# Patient Record
Sex: MALE | Race: WHITE | NOT HISPANIC OR LATINO | Employment: FULL TIME | URBAN - METROPOLITAN AREA
[De-identification: names, ages, dates, MRNs, and addresses within clinical notes are randomized per-mention and may not be internally consistent; named-entity substitution may affect disease eponyms.]

---

## 2023-05-09 DIAGNOSIS — I10 ESSENTIAL (PRIMARY) HYPERTENSION: ICD-10-CM

## 2023-05-09 DIAGNOSIS — E78.00 PURE HYPERCHOLESTEROLEMIA, UNSPECIFIED: ICD-10-CM

## 2023-05-09 DIAGNOSIS — F41.9 ANXIETY DISORDER, UNSPECIFIED: ICD-10-CM

## 2023-05-09 DIAGNOSIS — E78.1 PURE HYPERGLYCERIDEMIA: ICD-10-CM

## 2023-05-09 RX ORDER — GEMFIBROZIL 600 MG/1
TABLET, FILM COATED ORAL
Qty: 90 TABLET | Refills: 0 | Status: SHIPPED | OUTPATIENT
Start: 2023-05-09 | End: 2023-06-08 | Stop reason: SDUPTHER

## 2023-05-09 RX ORDER — ATORVASTATIN CALCIUM 40 MG/1
TABLET, FILM COATED ORAL
Qty: 90 TABLET | Refills: 0 | Status: SHIPPED | OUTPATIENT
Start: 2023-05-09 | End: 2023-06-08 | Stop reason: SDUPTHER

## 2023-05-09 RX ORDER — LISINOPRIL 10 MG/1
TABLET ORAL
Qty: 90 TABLET | Refills: 0 | Status: SHIPPED | OUTPATIENT
Start: 2023-05-09 | End: 2023-06-08 | Stop reason: SDUPTHER

## 2023-05-09 RX ORDER — SERTRALINE HYDROCHLORIDE 100 MG/1
TABLET, FILM COATED ORAL
Qty: 135 TABLET | Refills: 0 | Status: SHIPPED | OUTPATIENT
Start: 2023-05-09 | End: 2023-06-08 | Stop reason: SDUPTHER

## 2023-06-07 PROBLEM — E78.00 HYPERCHOLESTEROLEMIA: Status: ACTIVE | Noted: 2023-06-07

## 2023-06-07 PROBLEM — R73.09 ELEVATED GLUCOSE: Status: ACTIVE | Noted: 2023-06-07

## 2023-06-07 PROBLEM — F41.9 ANXIETY: Status: ACTIVE | Noted: 2023-06-07

## 2023-06-07 PROBLEM — D64.9 ANEMIA: Status: ACTIVE | Noted: 2023-06-07

## 2023-06-07 PROBLEM — I10 HYPERTENSION: Status: ACTIVE | Noted: 2023-06-07

## 2023-06-07 PROBLEM — R91.1 LUNG NODULE: Status: ACTIVE | Noted: 2023-06-07

## 2023-06-08 ENCOUNTER — OFFICE VISIT (OUTPATIENT)
Dept: PRIMARY CARE | Facility: CLINIC | Age: 48
End: 2023-06-08
Payer: COMMERCIAL

## 2023-06-08 VITALS
HEART RATE: 66 BPM | SYSTOLIC BLOOD PRESSURE: 133 MMHG | WEIGHT: 228.8 LBS | DIASTOLIC BLOOD PRESSURE: 78 MMHG | HEIGHT: 68 IN | BODY MASS INDEX: 34.68 KG/M2 | TEMPERATURE: 98.3 F

## 2023-06-08 DIAGNOSIS — R91.1 LUNG NODULE SEEN ON IMAGING STUDY: ICD-10-CM

## 2023-06-08 DIAGNOSIS — I10 ESSENTIAL (PRIMARY) HYPERTENSION: ICD-10-CM

## 2023-06-08 DIAGNOSIS — E78.00 PURE HYPERCHOLESTEROLEMIA, UNSPECIFIED: ICD-10-CM

## 2023-06-08 DIAGNOSIS — Z12.5 PROSTATE CANCER SCREENING: ICD-10-CM

## 2023-06-08 DIAGNOSIS — E78.1 PURE HYPERGLYCERIDEMIA: ICD-10-CM

## 2023-06-08 DIAGNOSIS — F41.9 ANXIETY DISORDER, UNSPECIFIED: ICD-10-CM

## 2023-06-08 DIAGNOSIS — Z00.00 HEALTHCARE MAINTENANCE: Primary | ICD-10-CM

## 2023-06-08 DIAGNOSIS — G47.33 OSA (OBSTRUCTIVE SLEEP APNEA): ICD-10-CM

## 2023-06-08 PROCEDURE — 3075F SYST BP GE 130 - 139MM HG: CPT | Performed by: FAMILY MEDICINE

## 2023-06-08 PROCEDURE — 3078F DIAST BP <80 MM HG: CPT | Performed by: FAMILY MEDICINE

## 2023-06-08 PROCEDURE — 1036F TOBACCO NON-USER: CPT | Performed by: FAMILY MEDICINE

## 2023-06-08 PROCEDURE — 99396 PREV VISIT EST AGE 40-64: CPT | Performed by: FAMILY MEDICINE

## 2023-06-08 RX ORDER — ATORVASTATIN CALCIUM 40 MG/1
TABLET, FILM COATED ORAL
Qty: 90 TABLET | Refills: 1 | Status: SHIPPED | OUTPATIENT
Start: 2023-06-08 | End: 2024-01-30

## 2023-06-08 RX ORDER — LISINOPRIL 10 MG/1
TABLET ORAL
Qty: 90 TABLET | Refills: 1 | Status: SHIPPED | OUTPATIENT
Start: 2023-06-08 | End: 2024-01-30

## 2023-06-08 RX ORDER — GEMFIBROZIL 600 MG/1
TABLET, FILM COATED ORAL
Qty: 90 TABLET | Refills: 1 | Status: SHIPPED | OUTPATIENT
Start: 2023-06-08 | End: 2024-01-30

## 2023-06-08 RX ORDER — SERTRALINE HYDROCHLORIDE 100 MG/1
150 TABLET, FILM COATED ORAL DAILY
Qty: 135 TABLET | Refills: 1 | Status: SHIPPED | OUTPATIENT
Start: 2023-06-08 | End: 2024-01-30

## 2023-06-08 ASSESSMENT — PATIENT HEALTH QUESTIONNAIRE - PHQ9
SUM OF ALL RESPONSES TO PHQ QUESTIONS 1-9: 0
SUM OF ALL RESPONSES TO PHQ9 QUESTIONS 1 AND 2: 0
7. TROUBLE CONCENTRATING ON THINGS, SUCH AS READING THE NEWSPAPER OR WATCHING TELEVISION: NOT AT ALL
4. FEELING TIRED OR HAVING LITTLE ENERGY: NOT AT ALL
1. LITTLE INTEREST OR PLEASURE IN DOING THINGS: NOT AT ALL
9. THOUGHTS THAT YOU WOULD BE BETTER OFF DEAD, OR OF HURTING YOURSELF: NOT AT ALL
8. MOVING OR SPEAKING SO SLOWLY THAT OTHER PEOPLE COULD HAVE NOTICED. OR THE OPPOSITE, BEING SO FIGETY OR RESTLESS THAT YOU HAVE BEEN MOVING AROUND A LOT MORE THAN USUAL: NOT AT ALL
6. FEELING BAD ABOUT YOURSELF - OR THAT YOU ARE A FAILURE OR HAVE LET YOURSELF OR YOUR FAMILY DOWN: NOT AT ALL
3. TROUBLE FALLING OR STAYING ASLEEP OR SLEEPING TOO MUCH: NOT AT ALL
5. POOR APPETITE OR OVEREATING: NOT AT ALL
2. FEELING DOWN, DEPRESSED OR HOPELESS: NOT AT ALL

## 2023-06-08 NOTE — PROGRESS NOTES
"Subjective   Patient ID: Edward Wolfe is a 48 y.o. male who presents for Annual Exam (He is not fasting for blood work today. ).    HPI     48 year-old male presents for a physical.     hx of HTN- on lisinopril 10mg; does not check BP at home.   hx of HLD- on gemfibrozil and atorvastatin  hx of anxiety- on zoloft 150mg/day and doing ok  hx elevated glucose    BMI 34  +exercise and trying to eat a healthy diet    tetanus- 2021  flu shot- defers  Shingrix-not yet      wears glasses; sees optho  sees dentist     he denies any chest pains, palpitations, edema, shortness of breath, abdominal pains, reflux, nausea, vomiting, diarrhea, constipation, blood in stool, melena. no groin or testicle pain or swelling.   does     Denies any mole changes.  Saw derm in past  No hx skin CA    has had a colonoscopy about 12 yrs ago with GI pearlweiler    Had CT calcium score 77181; score of 20 but showed some other abnormailitys/nodules; saw pulm for fu and had chest CT 8/2021- pulm recommended repeat CT in 12/2021 but never had done.     Hx BESSIE- used to use CPAP but not recently  +snoring/apnea/fatigue        Review of Systems  ROS as stated in HPI    Objective   /78   Pulse 66   Temp 36.8 °C (98.3 °F)   Ht 1.727 m (5' 8\")   Wt 104 kg (228 lb 12.8 oz)   BMI 34.79 kg/m²     Physical Exam  Constitutional: A&O; NAD  HEENT: conjunctiva normal. EOMI; PERRLA; lids normal; TM's clear;   Neck: supple; No lymphadenopathy   Heart: RRR     Lungs: CTA bilateral   Abd: Soft, Nontender, Nondistended  Ext:  No edema;    Neuro: No gross neuro deficits    Psych: Judgment, orientation, mood, affect, insight wnl   Assessment/Plan   Problem List Items Addressed This Visit    None  Visit Diagnoses       Healthcare maintenance    -  Primary    Relevant Orders    CBC    Comprehensive Metabolic Panel    Hemoglobin A1C    Lipid Panel    TSH with reflex to Free T4 if abnormal    Vitamin D, Total    Pure hypercholesterolemia, unspecified        " Relevant Medications    atorvastatin (Lipitor) 40 mg tablet    Pure hyperglyceridemia        Relevant Medications    gemfibrozil (Lopid) 600 mg tablet    Essential (primary) hypertension        Relevant Medications    lisinopril 10 mg tablet    Anxiety disorder, unspecified        Relevant Medications    sertraline (Zoloft) 100 mg tablet    Prostate cancer screening        Relevant Orders    Prostate Specific Antigen, Screen    Lung nodule seen on imaging study        Relevant Orders    CT chest wo IV contrast    BESSIE (obstructive sleep apnea)        Relevant Orders    Referral to Adult Sleep Medicine          Tdap 2021  defers flu shot  Shingrix recommended-info given   healthy lifestyle-diet, exercise, wt loss  check labs   rx refills sent   recheck chest CT   Fu with GI for screening colonoscopy  Fu with sleep med re: BESSIE

## 2023-06-26 LAB
NON-UH HIE A/G RATIO: 1.6
NON-UH HIE ALB: 4.6 G/DL (ref 3.4–5)
NON-UH HIE ALK PHOS: 75 UNIT/L (ref 46–116)
NON-UH HIE BILIRUBIN, TOTAL: 0.5 MG/DL (ref 0.2–1)
NON-UH HIE BUN/CREAT RATIO: 19.1
NON-UH HIE BUN: 21 MG/DL (ref 9–23)
NON-UH HIE CALCIUM: 9.7 MG/DL (ref 8.7–10.4)
NON-UH HIE CALCULATED LDL CHOLESTEROL: 144 MG/DL (ref 60–130)
NON-UH HIE CALCULATED OSMOLALITY: 279 MOSM/KG (ref 275–295)
NON-UH HIE CHLORIDE: 105 MMOL/L (ref 98–107)
NON-UH HIE CHOLESTEROL: 215 MG/DL (ref 100–200)
NON-UH HIE CO2, VENOUS: 23 MMOL/L (ref 20–31)
NON-UH HIE CREATININE: 1.1 MG/DL (ref 0.6–1.1)
NON-UH HIE GFR AA: >60
NON-UH HIE GLOBULIN: 2.9 G/DL
NON-UH HIE GLOMERULAR FILTRATION RATE: >60 ML/MIN/1.73M?
NON-UH HIE GLUCOSE: 107 MG/DL (ref 74–106)
NON-UH HIE GOT: 34 UNIT/L (ref 15–37)
NON-UH HIE GPT: 58 UNIT/L (ref 10–49)
NON-UH HIE HCT: 43.7 % (ref 41–52)
NON-UH HIE HDL CHOLESTEROL: 45 MG/DL (ref 40–60)
NON-UH HIE HGB A1C: 5.4 %
NON-UH HIE HGB: 14.7 G/DL (ref 13.5–17.5)
NON-UH HIE INSTR WBC ND: 7
NON-UH HIE K: 4.3 MMOL/L (ref 3.5–5.1)
NON-UH HIE MCH: 29.7 PG (ref 27–34)
NON-UH HIE MCHC: 33.7 G/DL (ref 32–37)
NON-UH HIE MCV: 88.2 FL (ref 80–100)
NON-UH HIE MPV: 7.7 FL (ref 7.4–10.4)
NON-UH HIE NA: 138 MMOL/L (ref 135–145)
NON-UH HIE PLATELET: 407 X10 (ref 150–450)
NON-UH HIE PROSTATIC SPECIFIC AG SCREEN: 2.6 NG/ML (ref 0–4)
NON-UH HIE RBC: 4.96 X10 (ref 4.7–6.1)
NON-UH HIE RDW: 13.4 % (ref 11.5–14.5)
NON-UH HIE TOTAL CHOL/HDL CHOL RATIO: 4.8
NON-UH HIE TOTAL PROTEIN: 7.5 G/DL (ref 5.7–8.2)
NON-UH HIE TRIGLYCERIDES: 131 MG/DL (ref 30–150)
NON-UH HIE TSH: 0.84 UIU/ML (ref 0.55–4.78)
NON-UH HIE VIT D 25: 18 NG/ML
NON-UH HIE WBC: 7 X10 (ref 4.5–11)

## 2023-06-28 ENCOUNTER — TELEPHONE (OUTPATIENT)
Dept: PRIMARY CARE | Facility: CLINIC | Age: 48
End: 2023-06-28
Payer: COMMERCIAL

## 2023-06-28 DIAGNOSIS — E55.9 VITAMIN D DEFICIENCY: ICD-10-CM

## 2023-06-28 RX ORDER — ERGOCALCIFEROL 1.25 MG/1
50000 CAPSULE ORAL
Qty: 12 CAPSULE | Refills: 0 | Status: SHIPPED | OUTPATIENT
Start: 2023-06-28 | End: 2023-09-20

## 2023-06-28 NOTE — TELEPHONE ENCOUNTER
----- Message from Nellie Harrison DO sent at 6/27/2023  7:40 PM EDT -----  Please let pt know that his prostate test,  blood counts, electrolytes, thyroid, and kidney function are all normal.   His cholesterol was elevated.    His sugar was slightly elevated at 107 but his HbA1c, the 3 month average of sugars was ok at 5.4. (anything 6.5 and above is considered diabetic).  I recommend a healthy diet and exercise and we will continue to monitor.   One of his liver enzymes was slightly elevated.   His vitamin D level is low at 18(should be above 30).  I recommend he take a higher dose prescription strength vitamin D once a week for 12 weeks and then follow up after to see how he is doing and recheck labs at that time.  Please enter rx for vitamin D 50, 000 units once a week #12 no refills dx: vitamin D deficiency

## 2023-07-17 ENCOUNTER — TELEPHONE (OUTPATIENT)
Dept: PRIMARY CARE | Facility: CLINIC | Age: 48
End: 2023-07-17
Payer: COMMERCIAL

## 2023-07-17 DIAGNOSIS — J98.59 MEDIASTINAL MASS: ICD-10-CM

## 2023-07-17 NOTE — TELEPHONE ENCOUNTER
Informed patient and entered referral for scheduling.  He also requested to have a Collect message sent with Dr. Whitaker's information.   Sent that as well.

## 2023-07-17 NOTE — TELEPHONE ENCOUNTER
----- Message from Nellie Harrison, DO sent at 7/17/2023  3:14 PM EDT -----  Please call pt and let him know his chest CT did not show any suspicious lung nodules but there is an irregular area in his chest that has gotten slightly larger since his last CT scan so  I would like him to follow up with a thoracic surgeon for further evaluation.   Please enter referral for thoracic surgeon and give him #s  dx mediastinal mass

## 2023-07-31 ENCOUNTER — TELEPHONE (OUTPATIENT)
Dept: PRIMARY CARE | Facility: CLINIC | Age: 48
End: 2023-07-31
Payer: COMMERCIAL

## 2023-07-31 NOTE — TELEPHONE ENCOUNTER
----- Message from Nellie Harrison DO sent at 7/17/2023  6:10 PM EDT -----  Noted; thanks. Please follow up and make sure he was able to schedule and for when.    ----- Message -----  From: Geetha Sales CMA  Sent: 7/17/2023   4:16 PM EDT  To: Nellie Harrison DO

## 2023-09-29 ENCOUNTER — PATIENT MESSAGE (OUTPATIENT)
Dept: PRIMARY CARE | Facility: CLINIC | Age: 48
End: 2023-09-29
Payer: COMMERCIAL

## 2023-09-29 DIAGNOSIS — E55.9 VITAMIN D DEFICIENCY: ICD-10-CM

## 2023-09-29 DIAGNOSIS — R73.09 ELEVATED GLUCOSE: ICD-10-CM

## 2023-09-29 DIAGNOSIS — I10 HYPERTENSION, UNSPECIFIED TYPE: Primary | ICD-10-CM

## 2023-09-29 DIAGNOSIS — E78.00 HYPERCHOLESTEROLEMIA: ICD-10-CM

## 2023-10-04 ENCOUNTER — LAB (OUTPATIENT)
Dept: LAB | Facility: LAB | Age: 48
End: 2023-10-04
Payer: COMMERCIAL

## 2023-10-04 DIAGNOSIS — D49.89 NEOPLASM OF UNSPECIFIED BEHAVIOR OF OTHER SPECIFIED SITES: Primary | ICD-10-CM

## 2023-10-04 LAB
ANION GAP SERPL CALC-SCNC: 14 MMOL/L (ref 10–20)
APTT PPP: 32 SECONDS (ref 27–38)
BUN SERPL-MCNC: 14 MG/DL (ref 6–23)
CALCIUM SERPL-MCNC: 9.9 MG/DL (ref 8.6–10.3)
CHLORIDE SERPL-SCNC: 104 MMOL/L (ref 98–107)
CO2 SERPL-SCNC: 24 MMOL/L (ref 21–32)
CREAT SERPL-MCNC: 0.99 MG/DL (ref 0.5–1.3)
ERYTHROCYTE [DISTWIDTH] IN BLOOD BY AUTOMATED COUNT: 11.9 % (ref 11.5–14.5)
GFR SERPL CREATININE-BSD FRML MDRD: >90 ML/MIN/1.73M*2
GLUCOSE SERPL-MCNC: 107 MG/DL (ref 74–99)
HCT VFR BLD AUTO: 40.5 % (ref 41–52)
HGB BLD-MCNC: 14 G/DL (ref 13.5–17.5)
INR PPP: 1 (ref 0.9–1.1)
LDH SERPL L TO P-CCNC: 175 U/L (ref 84–246)
MCH RBC QN AUTO: 30.2 PG (ref 26–34)
MCHC RBC AUTO-ENTMCNC: 34.6 G/DL (ref 32–36)
MCV RBC AUTO: 87 FL (ref 80–100)
NRBC BLD-RTO: 0 /100 WBCS (ref 0–0)
PLATELET # BLD AUTO: 416 X10*3/UL (ref 150–450)
PMV BLD AUTO: 9.3 FL (ref 7.5–11.5)
POTASSIUM SERPL-SCNC: 4.1 MMOL/L (ref 3.5–5.3)
PROTHROMBIN TIME: 11.2 SECONDS (ref 9.8–12.8)
RBC # BLD AUTO: 4.64 X10*6/UL (ref 4.5–5.9)
SODIUM SERPL-SCNC: 138 MMOL/L (ref 136–145)
WBC # BLD AUTO: 6.3 X10*3/UL (ref 4.4–11.3)

## 2023-10-04 PROCEDURE — 82105 ALPHA-FETOPROTEIN SERUM: CPT

## 2023-10-04 PROCEDURE — 84702 CHORIONIC GONADOTROPIN TEST: CPT

## 2023-10-04 PROCEDURE — 36415 COLL VENOUS BLD VENIPUNCTURE: CPT

## 2023-10-05 LAB
AFP SERPL-MCNC: <4 NG/ML (ref 0–9)
B-HCG SERPL-ACNC: <3 MIU/ML

## 2023-10-07 RX ORDER — ACETAMINOPHEN 500 MG
1000 TABLET ORAL 3 TIMES DAILY
COMMUNITY
Start: 2021-02-04 | End: 2023-10-07 | Stop reason: ALTCHOICE

## 2023-10-07 RX ORDER — IBUPROFEN 200 MG
200 TABLET ORAL EVERY 8 HOURS PRN
COMMUNITY

## 2023-10-09 ENCOUNTER — HOSPITAL ENCOUNTER (OUTPATIENT)
Dept: RADIOLOGY | Facility: HOSPITAL | Age: 48
Discharge: HOME | End: 2023-10-09
Payer: COMMERCIAL

## 2023-10-09 ENCOUNTER — LAB (OUTPATIENT)
Dept: LAB | Facility: LAB | Age: 48
End: 2023-10-09
Payer: COMMERCIAL

## 2023-10-09 VITALS
DIASTOLIC BLOOD PRESSURE: 79 MMHG | WEIGHT: 229.28 LBS | RESPIRATION RATE: 14 BRPM | HEART RATE: 63 BPM | TEMPERATURE: 97.7 F | HEIGHT: 68 IN | SYSTOLIC BLOOD PRESSURE: 122 MMHG | OXYGEN SATURATION: 96 % | BODY MASS INDEX: 34.75 KG/M2

## 2023-10-09 DIAGNOSIS — D49.89 NEOPLASM OF UNSPECIFIED BEHAVIOR OF OTHER SPECIFIED SITES: ICD-10-CM

## 2023-10-09 DIAGNOSIS — R73.09 ELEVATED GLUCOSE: ICD-10-CM

## 2023-10-09 DIAGNOSIS — E78.00 HYPERCHOLESTEROLEMIA: ICD-10-CM

## 2023-10-09 DIAGNOSIS — E55.9 VITAMIN D DEFICIENCY: ICD-10-CM

## 2023-10-09 DIAGNOSIS — R59.9 ENLARGED LYMPH NODES, UNSPECIFIED: ICD-10-CM

## 2023-10-09 DIAGNOSIS — I10 HYPERTENSION, UNSPECIFIED TYPE: ICD-10-CM

## 2023-10-09 LAB
25(OH)D3 SERPL-MCNC: 23 NG/ML (ref 30–100)
ALBUMIN SERPL BCP-MCNC: 4.9 G/DL (ref 3.4–5)
ALP SERPL-CCNC: 59 U/L (ref 33–120)
ALT SERPL W P-5'-P-CCNC: 26 U/L (ref 10–52)
ANION GAP SERPL CALC-SCNC: 16 MMOL/L (ref 10–20)
AST SERPL W P-5'-P-CCNC: 20 U/L (ref 9–39)
BILIRUB SERPL-MCNC: 0.5 MG/DL (ref 0–1.2)
BUN SERPL-MCNC: 18 MG/DL (ref 6–23)
CALCIUM SERPL-MCNC: 9.7 MG/DL (ref 8.6–10.6)
CHLORIDE SERPL-SCNC: 108 MMOL/L (ref 98–107)
CHOLEST SERPL-MCNC: 177 MG/DL (ref 0–199)
CHOLESTEROL/HDL RATIO: 4.5
CO2 SERPL-SCNC: 23 MMOL/L (ref 21–32)
CREAT SERPL-MCNC: 0.92 MG/DL (ref 0.5–1.3)
EST. AVERAGE GLUCOSE BLD GHB EST-MCNC: 111 MG/DL
GFR SERPL CREATININE-BSD FRML MDRD: >90 ML/MIN/1.73M*2
GLUCOSE SERPL-MCNC: 100 MG/DL (ref 74–99)
HBA1C MFR BLD: 5.5 %
HDLC SERPL-MCNC: 39.3 MG/DL
LDLC SERPL CALC-MCNC: 112 MG/DL (ref 140–190)
NON HDL CHOLESTEROL: 138 MG/DL (ref 0–149)
POTASSIUM SERPL-SCNC: 4.5 MMOL/L (ref 3.5–5.3)
PROT SERPL-MCNC: 7.5 G/DL (ref 6.4–8.2)
SODIUM SERPL-SCNC: 142 MMOL/L (ref 136–145)
TRIGL SERPL-MCNC: 127 MG/DL (ref 0–149)
VLDL: 25 MG/DL (ref 0–40)

## 2023-10-09 PROCEDURE — 88342 IMHCHEM/IMCYTCHM 1ST ANTB: CPT | Performed by: STUDENT IN AN ORGANIZED HEALTH CARE EDUCATION/TRAINING PROGRAM

## 2023-10-09 PROCEDURE — 88305 TISSUE EXAM BY PATHOLOGIST: CPT | Performed by: STUDENT IN AN ORGANIZED HEALTH CARE EDUCATION/TRAINING PROGRAM

## 2023-10-09 PROCEDURE — 38505 NEEDLE BIOPSY LYMPH NODES: CPT

## 2023-10-09 PROCEDURE — 88173 CYTOPATH EVAL FNA REPORT: CPT | Mod: TC | Performed by: THORACIC SURGERY (CARDIOTHORACIC VASCULAR SURGERY)

## 2023-10-09 PROCEDURE — 88184 FLOWCYTOMETRY/ TC 1 MARKER: CPT | Mod: TC | Performed by: THORACIC SURGERY (CARDIOTHORACIC VASCULAR SURGERY)

## 2023-10-09 PROCEDURE — 83036 HEMOGLOBIN GLYCOSYLATED A1C: CPT

## 2023-10-09 PROCEDURE — 88342 IMHCHEM/IMCYTCHM 1ST ANTB: CPT | Mod: TC,59,PARLAB | Performed by: THORACIC SURGERY (CARDIOTHORACIC VASCULAR SURGERY)

## 2023-10-09 PROCEDURE — 88305 TISSUE EXAM BY PATHOLOGIST: CPT | Mod: TC,CMCLAB,PARLAB | Performed by: THORACIC SURGERY (CARDIOTHORACIC VASCULAR SURGERY)

## 2023-10-09 PROCEDURE — 36415 COLL VENOUS BLD VENIPUNCTURE: CPT | Performed by: FAMILY MEDICINE

## 2023-10-09 PROCEDURE — 82306 VITAMIN D 25 HYDROXY: CPT

## 2023-10-09 PROCEDURE — 88173 CYTOPATH EVAL FNA REPORT: CPT | Performed by: PATHOLOGY

## 2023-10-09 PROCEDURE — 36415 COLL VENOUS BLD VENIPUNCTURE: CPT

## 2023-10-09 PROCEDURE — 10005 FNA BX W/US GDN 1ST LES: CPT | Performed by: RADIOLOGY

## 2023-10-09 PROCEDURE — 80053 COMPREHEN METABOLIC PANEL: CPT

## 2023-10-09 PROCEDURE — 80061 LIPID PANEL: CPT

## 2023-10-09 PROCEDURE — 10005 FNA BX W/US GDN 1ST LES: CPT

## 2023-10-09 PROCEDURE — 88305 TISSUE EXAM BY PATHOLOGIST: CPT | Mod: TC,SUR,CMCLAB,PARLAB | Performed by: THORACIC SURGERY (CARDIOTHORACIC VASCULAR SURGERY)

## 2023-10-09 PROCEDURE — 2720000007 HC OR 272 NO HCPCS

## 2023-10-09 PROCEDURE — 88189 FLOWCYTOMETRY/READ 16 & >: CPT | Performed by: PATHOLOGY

## 2023-10-09 ASSESSMENT — PAIN SCALES - GENERAL
PAINLEVEL_OUTOF10: 0 - NO PAIN

## 2023-10-09 ASSESSMENT — PAIN - FUNCTIONAL ASSESSMENT
PAIN_FUNCTIONAL_ASSESSMENT: 0-10
PAIN_FUNCTIONAL_ASSESSMENT: 0-10

## 2023-10-09 NOTE — PRE-PROCEDURE NOTE
Interventional Radiology Preprocedure Note    Indication for procedure: Diagnoses of Enlarged lymph nodes, unspecified and Neoplasm of unspecified behavior of other specified sites were pertinent to this visit.    Relevant review of systems: NA    Relevant Labs:   Lab Results   Component Value Date    CREATININE 0.99 10/04/2023    EGFR >90 10/04/2023    INR 1.0 10/04/2023    PROTIME 11.2 10/04/2023       Planned Sedation/Anesthesia: Minimal    Airway assessment: normal    Directed physical examination:    Aox3  No increased work of breathing.   No acute distress      Mallampati: II (hard and soft palate, upper portion of tonsils anduvula visible)    ASA Score: ASA 1 - Normal health patient    Benefits, risks and alternatives of procedure and planned sedation have been discussed with the patient and/or their representative. All questions answered and they agree to proceed.

## 2023-10-09 NOTE — POST-PROCEDURE NOTE
Interventional Radiology Brief Postprocedure Note    Attending: Jhonny Haas MD       Assistant: none    Diagnosis: supraclavicular lymphadenopathy    Description of procedure: right supraclavicular lymph node biopsy      Anesthesia:  Local    Complications: None    Estimated Blood Loss: none    Medications (Filter: Administrations occurring from 0843 to 0916 on 10/09/23)      None        25  and 22 G FNA sent in rpmi and cytology and 20 G core biopsy samples sent in formalin provided      See detailed result report with images in PACS.    The patient tolerated the procedure well without incident or complication and is in stable condition.

## 2023-10-09 NOTE — DISCHARGE INSTRUCTIONS
Patient educated regarding care after lymph node biopsy. Patient in stable condition and ready for discharge.

## 2023-10-12 LAB
CELL COUNT (BLOOD): 0.08 X10*3/UL
CELL POPULATIONS: NORMAL
DIAGNOSIS: NORMAL
FLOW DIFFERENTIAL: NORMAL
FLOW TEST ORDERED: NORMAL
LAB TEST METHOD: NORMAL
NUMBER OF CELLS COLLECTED: NORMAL
PATH REPORT.TOTAL CANCER: NORMAL
SIGNATURE COMMENT: NORMAL
SPECIMEN VIABILITY: NORMAL

## 2023-10-13 ENCOUNTER — PREP FOR PROCEDURE (OUTPATIENT)
Dept: SURGERY | Facility: CLINIC | Age: 48
End: 2023-10-13
Payer: COMMERCIAL

## 2023-10-13 DIAGNOSIS — R59.9 LYMPH NODE ENLARGEMENT: Primary | ICD-10-CM

## 2023-10-13 LAB
LAB AP ASR DISCLAIMER: NORMAL
LABORATORY COMMENT REPORT: NORMAL
PATH REPORT.COMMENTS IMP SPEC: NORMAL
PATH REPORT.FINAL DX SPEC: NORMAL
PATH REPORT.GROSS SPEC: NORMAL
PATH REPORT.RELEVANT HX SPEC: NORMAL
PATH REPORT.TOTAL CANCER: NORMAL

## 2023-10-16 ENCOUNTER — TELEPHONE (OUTPATIENT)
Dept: PRIMARY CARE | Facility: CLINIC | Age: 48
End: 2023-10-16
Payer: COMMERCIAL

## 2023-10-16 PROBLEM — R59.9 LYMPH NODE ENLARGEMENT: Status: ACTIVE | Noted: 2023-10-13

## 2023-10-16 LAB
LABORATORY COMMENT REPORT: NORMAL
LABORATORY COMMENT REPORT: NORMAL
PATH REPORT.COMMENTS IMP SPEC: NORMAL
PATH REPORT.FINAL DX SPEC: NORMAL
PATH REPORT.GROSS SPEC: NORMAL
PATH REPORT.RELEVANT HX SPEC: NORMAL
PATH REPORT.TOTAL CANCER: NORMAL

## 2023-10-16 NOTE — TELEPHONE ENCOUNTER
----- Message from Nellie Harrison DO sent at 10/10/2023  4:49 PM EDT -----  Please let pt know that his electrolytes, liver, and kidney function are all normal. His LDL cholesterol was just slightly elevated.  His sugar was slightly elevated at 100 but his  HbA1c, the 3 month average of sugars was ok at 5.5  (anything 6.5 and above is considered diabetic).  I recommend a healthy diet and exercise and we will continue to monitor.   His vitamin D level is low at 23(should be above 30).  I recommend he  take at least 2000 units of an OTC vitamin D supplement daily and we will cont to monitor.

## 2023-10-19 ENCOUNTER — ANESTHESIA EVENT (OUTPATIENT)
Dept: OPERATING ROOM | Facility: HOSPITAL | Age: 48
End: 2023-10-19
Payer: COMMERCIAL

## 2023-10-20 ENCOUNTER — HOSPITAL ENCOUNTER (OUTPATIENT)
Facility: HOSPITAL | Age: 48
Setting detail: OUTPATIENT SURGERY
Discharge: HOME | End: 2023-10-20
Attending: THORACIC SURGERY (CARDIOTHORACIC VASCULAR SURGERY) | Admitting: THORACIC SURGERY (CARDIOTHORACIC VASCULAR SURGERY)
Payer: COMMERCIAL

## 2023-10-20 ENCOUNTER — ANESTHESIA (OUTPATIENT)
Dept: OPERATING ROOM | Facility: HOSPITAL | Age: 48
End: 2023-10-20
Payer: COMMERCIAL

## 2023-10-20 ENCOUNTER — APPOINTMENT (OUTPATIENT)
Dept: RADIOLOGY | Facility: HOSPITAL | Age: 48
End: 2023-10-20
Payer: COMMERCIAL

## 2023-10-20 VITALS
WEIGHT: 235.01 LBS | RESPIRATION RATE: 16 BRPM | SYSTOLIC BLOOD PRESSURE: 118 MMHG | DIASTOLIC BLOOD PRESSURE: 76 MMHG | OXYGEN SATURATION: 95 % | TEMPERATURE: 97 F | BODY MASS INDEX: 35.62 KG/M2 | HEIGHT: 68 IN | HEART RATE: 68 BPM

## 2023-10-20 DIAGNOSIS — R59.9 LYMPH NODE ENLARGEMENT: Primary | ICD-10-CM

## 2023-10-20 PROBLEM — K21.9 GASTROESOPHAGEAL REFLUX DISEASE: Status: ACTIVE | Noted: 2023-10-20

## 2023-10-20 PROBLEM — E66.9 OBESITY: Status: ACTIVE | Noted: 2023-10-20

## 2023-10-20 PROBLEM — R91.8 PULMONARY NODULES: Status: ACTIVE | Noted: 2023-06-07

## 2023-10-20 PROBLEM — G47.33 OSA (OBSTRUCTIVE SLEEP APNEA): Status: ACTIVE | Noted: 2023-10-20

## 2023-10-20 LAB
ABO GROUP (TYPE) IN BLOOD: NORMAL
ANTIBODY SCREEN: NORMAL
RH FACTOR (ANTIGEN D): NORMAL

## 2023-10-20 PROCEDURE — 86900 BLOOD TYPING SEROLOGIC ABO: CPT | Performed by: STUDENT IN AN ORGANIZED HEALTH CARE EDUCATION/TRAINING PROGRAM

## 2023-10-20 PROCEDURE — 7100000002 HC RECOVERY ROOM TIME - EACH INCREMENTAL 1 MINUTE: Performed by: THORACIC SURGERY (CARDIOTHORACIC VASCULAR SURGERY)

## 2023-10-20 PROCEDURE — 88173 CYTOPATH EVAL FNA REPORT: CPT | Performed by: PATHOLOGY

## 2023-10-20 PROCEDURE — 31653 BRONCH EBUS SAMPLNG 3/> NODE: CPT | Performed by: THORACIC SURGERY (CARDIOTHORACIC VASCULAR SURGERY)

## 2023-10-20 PROCEDURE — A31629 PR BRONCHOSCOPY,TRANSBRON ASPIR BX: Performed by: ANESTHESIOLOGY

## 2023-10-20 PROCEDURE — 2500000004 HC RX 250 GENERAL PHARMACY W/ HCPCS (ALT 636 FOR OP/ED): Performed by: THORACIC SURGERY (CARDIOTHORACIC VASCULAR SURGERY)

## 2023-10-20 PROCEDURE — 2500000005 HC RX 250 GENERAL PHARMACY W/O HCPCS: Performed by: NURSE ANESTHETIST, CERTIFIED REGISTERED

## 2023-10-20 PROCEDURE — 7100000001 HC RECOVERY ROOM TIME - INITIAL BASE CHARGE: Performed by: THORACIC SURGERY (CARDIOTHORACIC VASCULAR SURGERY)

## 2023-10-20 PROCEDURE — 2500000004 HC RX 250 GENERAL PHARMACY W/ HCPCS (ALT 636 FOR OP/ED): Performed by: NURSE ANESTHETIST, CERTIFIED REGISTERED

## 2023-10-20 PROCEDURE — 3700000002 HC GENERAL ANESTHESIA TIME - EACH INCREMENTAL 1 MINUTE: Performed by: THORACIC SURGERY (CARDIOTHORACIC VASCULAR SURGERY)

## 2023-10-20 PROCEDURE — 7100000009 HC PHASE TWO TIME - INITIAL BASE CHARGE: Performed by: THORACIC SURGERY (CARDIOTHORACIC VASCULAR SURGERY)

## 2023-10-20 PROCEDURE — 88172 CYTP DX EVAL FNA 1ST EA SITE: CPT | Mod: TC,MCY | Performed by: THORACIC SURGERY (CARDIOTHORACIC VASCULAR SURGERY)

## 2023-10-20 PROCEDURE — 3600000009 HC OR TIME - EACH INCREMENTAL 1 MINUTE - PROCEDURE LEVEL FOUR: Performed by: THORACIC SURGERY (CARDIOTHORACIC VASCULAR SURGERY)

## 2023-10-20 PROCEDURE — 71045 X-RAY EXAM CHEST 1 VIEW: CPT | Performed by: RADIOLOGY

## 2023-10-20 PROCEDURE — 3700000001 HC GENERAL ANESTHESIA TIME - INITIAL BASE CHARGE: Performed by: THORACIC SURGERY (CARDIOTHORACIC VASCULAR SURGERY)

## 2023-10-20 PROCEDURE — 88305 TISSUE EXAM BY PATHOLOGIST: CPT | Performed by: PATHOLOGY

## 2023-10-20 PROCEDURE — 2720000007 HC OR 272 NO HCPCS: Performed by: THORACIC SURGERY (CARDIOTHORACIC VASCULAR SURGERY)

## 2023-10-20 PROCEDURE — 36415 COLL VENOUS BLD VENIPUNCTURE: CPT | Performed by: STUDENT IN AN ORGANIZED HEALTH CARE EDUCATION/TRAINING PROGRAM

## 2023-10-20 PROCEDURE — A31629 PR BRONCHOSCOPY,TRANSBRON ASPIR BX: Performed by: NURSE ANESTHETIST, CERTIFIED REGISTERED

## 2023-10-20 PROCEDURE — 71045 X-RAY EXAM CHEST 1 VIEW: CPT

## 2023-10-20 PROCEDURE — 7100000010 HC PHASE TWO TIME - EACH INCREMENTAL 1 MINUTE: Performed by: THORACIC SURGERY (CARDIOTHORACIC VASCULAR SURGERY)

## 2023-10-20 PROCEDURE — 88172 CYTP DX EVAL FNA 1ST EA SITE: CPT | Performed by: PATHOLOGY

## 2023-10-20 PROCEDURE — A4217 STERILE WATER/SALINE, 500 ML: HCPCS | Performed by: THORACIC SURGERY (CARDIOTHORACIC VASCULAR SURGERY)

## 2023-10-20 PROCEDURE — 3600000004 HC OR TIME - INITIAL BASE CHARGE - PROCEDURE LEVEL FOUR: Performed by: THORACIC SURGERY (CARDIOTHORACIC VASCULAR SURGERY)

## 2023-10-20 RX ORDER — ROCURONIUM BROMIDE 10 MG/ML
INJECTION, SOLUTION INTRAVENOUS AS NEEDED
Status: DISCONTINUED | OUTPATIENT
Start: 2023-10-20 | End: 2023-10-20

## 2023-10-20 RX ORDER — LIDOCAINE HYDROCHLORIDE 20 MG/ML
INJECTION, SOLUTION INFILTRATION; PERINEURAL AS NEEDED
Status: DISCONTINUED | OUTPATIENT
Start: 2023-10-20 | End: 2023-10-20

## 2023-10-20 RX ORDER — DEXAMETHASONE SODIUM PHOSPHATE 4 MG/ML
INJECTION, SOLUTION INTRA-ARTICULAR; INTRALESIONAL; INTRAMUSCULAR; INTRAVENOUS; SOFT TISSUE AS NEEDED
Status: DISCONTINUED | OUTPATIENT
Start: 2023-10-20 | End: 2023-10-20

## 2023-10-20 RX ORDER — MIDAZOLAM HYDROCHLORIDE 1 MG/ML
INJECTION, SOLUTION INTRAMUSCULAR; INTRAVENOUS AS NEEDED
Status: DISCONTINUED | OUTPATIENT
Start: 2023-10-20 | End: 2023-10-20

## 2023-10-20 RX ORDER — SODIUM CHLORIDE 0.9 G/100ML
IRRIGANT IRRIGATION AS NEEDED
Status: DISCONTINUED | OUTPATIENT
Start: 2023-10-20 | End: 2023-10-20 | Stop reason: HOSPADM

## 2023-10-20 RX ORDER — PROPOFOL 10 MG/ML
INJECTION, EMULSION INTRAVENOUS CONTINUOUS PRN
Status: DISCONTINUED | OUTPATIENT
Start: 2023-10-20 | End: 2023-10-20

## 2023-10-20 RX ORDER — PROPOFOL 10 MG/ML
INJECTION, EMULSION INTRAVENOUS AS NEEDED
Status: DISCONTINUED | OUTPATIENT
Start: 2023-10-20 | End: 2023-10-20

## 2023-10-20 RX ORDER — OMEPRAZOLE 40 MG/1
40 CAPSULE, DELAYED RELEASE ORAL DAILY
COMMUNITY
Start: 2023-10-09 | End: 2023-11-07 | Stop reason: ALTCHOICE

## 2023-10-20 RX ORDER — SODIUM CHLORIDE, SODIUM LACTATE, POTASSIUM CHLORIDE, CALCIUM CHLORIDE 600; 310; 30; 20 MG/100ML; MG/100ML; MG/100ML; MG/100ML
INJECTION, SOLUTION INTRAVENOUS CONTINUOUS PRN
Status: DISCONTINUED | OUTPATIENT
Start: 2023-10-20 | End: 2023-10-20

## 2023-10-20 RX ORDER — ACETAMINOPHEN 325 MG/1
650 TABLET ORAL EVERY 6 HOURS PRN
Status: DISCONTINUED | OUTPATIENT
Start: 2023-10-20 | End: 2023-10-20 | Stop reason: HOSPADM

## 2023-10-20 RX ORDER — SODIUM CHLORIDE, SODIUM LACTATE, POTASSIUM CHLORIDE, CALCIUM CHLORIDE 600; 310; 30; 20 MG/100ML; MG/100ML; MG/100ML; MG/100ML
125 INJECTION, SOLUTION INTRAVENOUS CONTINUOUS
Status: DISCONTINUED | OUTPATIENT
Start: 2023-10-20 | End: 2023-10-20 | Stop reason: HOSPADM

## 2023-10-20 RX ORDER — FENTANYL CITRATE 50 UG/ML
INJECTION, SOLUTION INTRAMUSCULAR; INTRAVENOUS AS NEEDED
Status: DISCONTINUED | OUTPATIENT
Start: 2023-10-20 | End: 2023-10-20

## 2023-10-20 RX ORDER — ONDANSETRON HYDROCHLORIDE 2 MG/ML
INJECTION, SOLUTION INTRAVENOUS AS NEEDED
Status: DISCONTINUED | OUTPATIENT
Start: 2023-10-20 | End: 2023-10-20

## 2023-10-20 RX ORDER — ONDANSETRON HYDROCHLORIDE 2 MG/ML
4 INJECTION, SOLUTION INTRAVENOUS EVERY 30 MIN PRN
Status: DISCONTINUED | OUTPATIENT
Start: 2023-10-20 | End: 2023-10-20 | Stop reason: HOSPADM

## 2023-10-20 RX ADMIN — PROPOFOL 30 MG: 10 INJECTION, EMULSION INTRAVENOUS at 08:36

## 2023-10-20 RX ADMIN — MIDAZOLAM 2 MG: 1 INJECTION INTRAMUSCULAR; INTRAVENOUS at 07:32

## 2023-10-20 RX ADMIN — LIDOCAINE HYDROCHLORIDE 40 MG: 20 INJECTION, SOLUTION INFILTRATION; PERINEURAL at 08:50

## 2023-10-20 RX ADMIN — PROPOFOL 170 MG: 10 INJECTION, EMULSION INTRAVENOUS at 07:35

## 2023-10-20 RX ADMIN — FENTANYL CITRATE 100 MCG: 50 INJECTION, SOLUTION INTRAMUSCULAR; INTRAVENOUS at 07:35

## 2023-10-20 RX ADMIN — Medication 8 L/MIN: at 09:15

## 2023-10-20 RX ADMIN — SODIUM CHLORIDE, POTASSIUM CHLORIDE, SODIUM LACTATE AND CALCIUM CHLORIDE: 600; 310; 30; 20 INJECTION, SOLUTION INTRAVENOUS at 07:20

## 2023-10-20 RX ADMIN — DEXAMETHASONE SODIUM PHOSPHATE 10 MG: 4 INJECTION, SOLUTION INTRA-ARTICULAR; INTRALESIONAL; INTRAMUSCULAR; INTRAVENOUS; SOFT TISSUE at 07:45

## 2023-10-20 RX ADMIN — ROCURONIUM 50 MG: 50 INJECTION, SOLUTION INTRAVENOUS at 07:37

## 2023-10-20 RX ADMIN — ONDANSETRON 4 MG: 2 INJECTION INTRAMUSCULAR; INTRAVENOUS at 08:50

## 2023-10-20 RX ADMIN — LIDOCAINE HYDROCHLORIDE 60 MG: 20 INJECTION, SOLUTION INFILTRATION; PERINEURAL at 07:35

## 2023-10-20 RX ADMIN — ROCURONIUM 20 MG: 50 INJECTION, SOLUTION INTRAVENOUS at 08:16

## 2023-10-20 RX ADMIN — PROPOFOL 150 MCG/KG/MIN: 10 INJECTION, EMULSION INTRAVENOUS at 07:49

## 2023-10-20 SDOH — HEALTH STABILITY: MENTAL HEALTH: CURRENT SMOKER: 0

## 2023-10-20 ASSESSMENT — PAIN - FUNCTIONAL ASSESSMENT
PAIN_FUNCTIONAL_ASSESSMENT: 0-10

## 2023-10-20 ASSESSMENT — PAIN SCALES - GENERAL
PAINLEVEL_OUTOF10: 0 - NO PAIN
PAIN_LEVEL: 0
PAINLEVEL_OUTOF10: 0 - NO PAIN

## 2023-10-20 ASSESSMENT — COLUMBIA-SUICIDE SEVERITY RATING SCALE - C-SSRS
1. IN THE PAST MONTH, HAVE YOU WISHED YOU WERE DEAD OR WISHED YOU COULD GO TO SLEEP AND NOT WAKE UP?: NO
6. HAVE YOU EVER DONE ANYTHING, STARTED TO DO ANYTHING, OR PREPARED TO DO ANYTHING TO END YOUR LIFE?: NO
2. HAVE YOU ACTUALLY HAD ANY THOUGHTS OF KILLING YOURSELF?: NO

## 2023-10-20 NOTE — ANESTHESIA PREPROCEDURE EVALUATION
Patient: Edward Wolfe    Procedure Information       Date/Time: 10/20/23 0715    Procedures:       Bronchoscopy Flexible (Right: Chest) - FLEXIBLE BRONCHOSCOPY, 49650 EBUS  [id number]  POSSIBLE VATS [id number] (CODES USED IN THE PAST)      Added due to Bronchoscopy can not be scheduled as a standalone case (Right) - POSSIBLE VATS [id number]    Location: [facility] OR 14 / [facility] OR    Surgeons: Anil Whitaker MD            Relevant Problems   Anesthesia (within normal limits)      Cardiovascular   (+) Hypercholesterolemia   (+) Hypertension      Endocrine   (+) Obesity      GI   (+) Gastroesophageal reflux disease      Neuro/Psych   (+) Anxiety      Pulmonary  Chest CT 8.11.21:    IMPRESSION:  1. Interval increase in the size of an irregular mass in the anterior  mediastinum with associated calcifications when compared to prior CT  examinations on 08/11/2021. Prominent mediastinal lymphadenopathy is  also noted which has increased compared to prior examinations in  2021. Findings are suspicious for malignancy with differential  diagnosis including thymic epithelial neoplasm versus lymphoma.  Further evaluation with PET-CT is recommended  2. No splenomegaly.  3. No suspicious pulmonary nodules identified.  4.  Additional findings as above.        Social History    Tobacco Use      Smoking status: Never      Smokeless tobacco: Never        BESSIE: Non-compliant with CPAP   (+) BESSIE (obstructive sleep apnea)   (+) Pulmonary nodules      Hematology   (+) Anemia      Other  Patient is a 47 YO male with a PMH of lung nodules, who presents for EBUS procedure by Dr. Whitaker on Friday 10.20.23.       Clinical information reviewed:   Tobacco  Allergies  Meds   Med Hx  Surg Hx   Fam Hx  Soc Hx        NPO Detail:  NPO/Void Status  Date of Last Liquid: 10/19/23  Date of Last Solid: 10/19/23  Last Intake Type: Clear fluids         Physical Exam    Airway  Mallampati: II  TM distance: >3 FB  Neck ROM: full     Cardiovascular -  normal exam  Rhythm: regular  Rate: normal     Dental    Pulmonary - normal exam  Breath sounds clear to auscultation     Abdominal            Anesthesia Plan    ASA 3     general     The patient is not a current smoker.    intravenous and inhalational induction   Anesthetic plan and risks discussed with patient.  Use of blood products discussed with patient who.

## 2023-10-20 NOTE — ANESTHESIA PROCEDURE NOTES
Peripheral IV  Date/Time: 10/20/2023 7:00 AM      Placement  Needle size: 18 G  Laterality: right  Location: hand  Site prep: alcohol  Technique: anatomical landmarks  Attempts: 1

## 2023-10-20 NOTE — H&P
History Of Present Illness  Edward Wolfe is a 48 y.o. male presenting in normal state of health.     Past Medical History  Past Medical History:   Diagnosis Date    Contusion of right hand, initial encounter 04/07/2018    Contusion of right hand, initial encounter    Disruption of wound, unspecified, initial encounter 01/02/2017    Dehiscence of wound of skin    Laceration without foreign body of left hand, initial encounter 01/02/2017    Laceration of hand, left    Laceration without foreign body of left hand, initial encounter 01/02/2017    Laceration of hand, left    Left testicular pain 11/06/2017    Testicular pain, left    Other abnormal findings in urine     Leukocytes in urine    Other injury of unspecified body region, initial encounter 01/02/2017    Infected laceration of skin    Other specified soft tissue disorders 04/20/2017    Soft tissue mass    Pain in right hand 04/07/2018    Hand pain, right    Personal history of diseases of the skin and subcutaneous tissue 07/23/2019    History of folliculitis    Personal history of diseases of the skin and subcutaneous tissue 07/23/2019    History of folliculitis    Personal history of other diseases of the circulatory system 07/25/2019    History of lymphadenitis    Personal history of other mental and behavioral disorders 08/18/2014    History of depression    Personal history of other specified conditions 11/06/2017    History of abdominal pain       Surgical History  Past Surgical History:   Procedure Laterality Date    APPENDECTOMY  08/18/2014    Appendectomy    JOINT REPLACEMENT      MOUTH SURGERY  01/11/2016    Oral Surgery Tooth Extraction    OTHER SURGICAL HISTORY  01/22/2020    Rotator cuff repair    OTHER SURGICAL HISTORY  02/18/2021    Hip surgery    SHOULDER SURGERY  08/18/2014    Shoulder Surgery    US GUIDED BIOPSY LYMPH NODE SUPERFICIAL  10/9/2023    US GUIDED BIOPSY LYMPH NODE SUPERFICIAL 10/9/2023 PAR US        Social History  He reports that  he has never smoked. He has never used smokeless tobacco. He reports current alcohol use. He reports that he does not use drugs.    Family History  No family history on file.     Allergies  Patient has no known allergies.    Review of Systems  No concerns     Physical Exam  General: NAD   Neuro: No focal deficits. Aox4  HENT: Atraumatic / normocephalic; supple neck; no carotid bruit; Trachea midline  Chest: Symmetric chest rise. No crepitus. No obvious deformity  Lungs: CTAB  CV: S1 and S2 are audible. No MRG. Palpable peripheral pulses  Abdomen: Soft, NT / ND. No masses.   Inguinal: Palpable femoral pulses  Extremities: WWP  Skin: Moist. No rash     Assessment / Plan  OR today for bronchoscopy    Pippa Sanchez MD  Cardiothoracic Surgery Fellow  PGY-6  Pager: [phone]

## 2023-10-20 NOTE — DISCHARGE INSTRUCTIONS
Light meals and light activity for the rest of the day.  OK to resume normal diet/activity tomorrow.    You may have increased coughing and/or blood-tinged sputum (pink, light red) after this procedure which is expected.  If you start to cough up blood clots or have difficulty breathing, this would be unexpected and so please go the nearest emergency room.  If this happens, please collect all the expectorant fluid/sputum that you cough out so that it can be measured.

## 2023-10-20 NOTE — BRIEF OP NOTE
Date: 10/20/2023  OR Location: [facility] OR    Name: Edward Wolfe, : 1975, Age: 48 y.o., MRN: [id number], Sex: male    Diagnosis  Pre-op Diagnosis     * Lymph node enlargement [R59.9] Post-op Diagnosis     * Lymph node enlargement [R59.9]     Procedures  Added due to Bronchoscopy can not be scheduled as a standalone case    Bronchoscopy Flexible  [id number] - ID [facility] EBUS GUIDED SAMPL 3/> NODE STATION/STRUX      Surgeons      * Anil Whitaker - Primary    Resident/Fellow/Other Assistant:  Surgeon(s) and Role:     * Terrance Bravo MD - Fellow    Procedure Summary  Anesthesia: General  ASA: III  Anesthesia Staff: Anesthesiologist: Hipolito Thurston MD  CRNA: GRACIELA BlackCRNA; IRINA Palacios  Estimated Blood Loss: 1mL  Intra-op Medications:   Medication Name Total Dose   sodium chloride 0.9 % irrigation solution 1,000 mL              Anesthesia Record               Intraprocedure I/O Totals          Intake    Propofol Drip 0.00 mL    The total shown is the total volume documented since Anesthesia Start was filed.    Total Intake 0 mL          Specimen:   ID Type Source Tests Collected by Time   1 : LEVEL 7 LYMPH NODE Non-Gynecologic Cytology LYMPH NODE 7 PULMONARY FINE NEEDLE ASPIRATION CYTOLOGY CONSULTATION (NON-GYNECOLOGIC) Anil Whitaker MD 10/20/2023 0756   2 : LEVEL 10R LYMPH NODE Non-Gynecologic Cytology LYMPH NODE 10 R PULMONARY FINE NEEDLE ASPIRATION CYTOLOGY CONSULTATION (NON-GYNECOLOGIC) Anil Whitaker MD 10/20/2023 0811   3 : LEVEL 4R LYMPH NODE Non-Gynecologic Cytology LYMPH NODE 4 R PULMONARY FINE NEEDLE ASPIRATION CYTOLOGY CONSULTATION (NON-GYNECOLOGIC) Anil Whitaker MD 10/20/2023 0842        Staff:   Circulator: Samara Huizar RN  Scrub Person: Vanessa Hou; Belkys Fierro          Findings: EBUS abnormal LN#4R, 7, 10R    Complications:  None; patient tolerated the procedure well.     Disposition: PACU - hemodynamically stable.  Condition:  stable  Specimens Collected:   ID Type Source Tests Collected by Time   1 : LEVEL 7 LYMPH NODE Non-Gynecologic Cytology LYMPH NODE 7 PULMONARY FINE NEEDLE ASPIRATION CYTOLOGY CONSULTATION (NON-GYNECOLOGIC) Anil Whitaker MD 10/20/2023 0756   2 : LEVEL 10R LYMPH NODE Non-Gynecologic Cytology LYMPH NODE 10 R PULMONARY FINE NEEDLE ASPIRATION CYTOLOGY CONSULTATION (NON-GYNECOLOGIC) Anil Whitaker MD 10/20/2023 0811   3 : LEVEL 4R LYMPH NODE Non-Gynecologic Cytology LYMPH NODE 4 R PULMONARY FINE NEEDLE ASPIRATION CYTOLOGY CONSULTATION (NON-GYNECOLOGIC) Anil Whitaker MD 10/20/2023 0842     Attending Attestation: I was present and scrubbed for the entire procedure.    Anil Whitaker  Phone Number: [phone]

## 2023-10-20 NOTE — ANESTHESIA PROCEDURE NOTES
Airway  Date/Time: 10/20/2023 7:38 AM  Urgency: elective    Airway not difficult    Staffing  Performed: SRNA   Authorized by: Hipolito Thurston MD    Performed by: LIZANDRO Palacios-DONNA  Patient location during procedure: OR    Indications and Patient Condition  Indications for airway management: anesthesia  Sedation level: deep  Preoxygenated: yes  Patient position: sniffing  Mask difficulty assessment: 2 - vent by mask + OA or adjuvant +/- NMBA  Planned trial extubation    Final Airway Details  Final airway type: endotracheal airway      Successful airway: ETT  Cuffed: yes   Successful intubation technique: direct laryngoscopy  Facilitating devices/methods: intubating stylet  Endotracheal tube insertion site: oral  Blade: Kumar  Blade size: #4  ETT size (mm): 8.0  Cormack-Lehane Classification: grade I - full view of glottis  Placement verified by: capnometry   Measured from: teeth  ETT to teeth (cm): 22  Number of attempts at approach: 1

## 2023-10-20 NOTE — PERIOPERATIVE NURSING NOTE
Instructions given to patient and family.  Questions answered.  Patients vital signs stable, oral cavity intact.  No  evidence of bleeding.

## 2023-10-20 NOTE — OP NOTE
Bronchoscopy Flexible (R), Added due to Bronchoscopy can not be scheduled as a standalone case (R) Operative Note  Patient: Edward Wolfe  : 1975  MRN: [id number]    Preoperative Diagnosis: Lymph node enlargement [R59.9]  Postoperative Diagnosis: Lymph node enlargement [R59.9]     Procedure: Bronchoscopy with endobronchial ultrasound      Surgeon: Surgeon(s):  MD Terrance Bonilla MD    Other Staff: Surgeon(s) and Role:     * Terrance Bravo MD - Fellow     Anesthesia Type: General  Anesthesia Staff: Anesthesiologist: Hipolito Thurston MD  CRNA: Ruth Ann Weinberg, LIZANDRO-CRNA; LIZANDRO Palacios-CRNA    Indications: Edward Wolfe is an 48 y.o. male who presents for bronchoscopy and endobronchial ultrasound. The patient presented to me for evaluation of mediastinal adenopathy, and I was concerned that this could represent a malignant process or other illness that required treatment. I recommended bronchoscopy and EndoBronchial UltraSound (EBUS) to evaluate his mediastinal lymph nodes.    The patient was seen in the preoperative area. The risks, benefits, complications, treatment options, non-operative alternatives, expected recovery and outcomes were discussed with the patient. The possibilities of reaction to medication, pulmonary aspiration, injury to surrounding structures, bleeding, recurrent infection, the need for additional procedures, failure to diagnose a condition, and creating a complication requiring transfusion or operation were discussed with the patient. The patient concurred with the proposed plan, giving informed consent.  The site of surgery was properly noted/marked if necessary per policy.     Procedure Details:   The patient was given general endotracheal anesthesia. I performed a bronchoscopy, which showed grossly normal endobronchial anatomy with no endobronchial tumors. I removed the bronchoscope and introduced an EBUS scope. I encountered several enlarged lymph  nodes. I chose to perform biopsies of the following lymph node level(s):7, 4R, and 10R. Rapid on-site evaluation of cytology was performed and specimens were felt to be adequate for diagnosis. The pathologist reported that the initial evaluation of the specimen suggested  atypical cells of unclear significance*. The procedure was completed, the patient was extubated, and brought to recovery in good condition.    Specimens:   ID Type Source Tests Collected by Time   1 : LEVEL 7 LYMPH NODE Non-Gynecologic Cytology LYMPH NODE 7 PULMONARY FINE NEEDLE ASPIRATION CYTOLOGY CONSULTATION (NON-GYNECOLOGIC) Anil Whitaker MD 10/20/2023 0756   2 : LEVEL 10R LYMPH NODE Non-Gynecologic Cytology LYMPH NODE 10 R PULMONARY FINE NEEDLE ASPIRATION CYTOLOGY CONSULTATION (NON-GYNECOLOGIC) Anil Whitaker MD 10/20/2023 0811   3 : LEVEL 4R LYMPH NODE Non-Gynecologic Cytology LYMPH NODE 4 R PULMONARY FINE NEEDLE ASPIRATION CYTOLOGY CONSULTATION (NON-GYNECOLOGIC) Anil Whitaker MD 10/20/2023 0842       Estimated blood loss: minimal  Complications: none  Disposition: PACU - hemodynamically stable.  Condition: stable     Attending Attestation: I was present for the entire procedure.    Anil Whitaker  Phone Number: [phone]

## 2023-10-20 NOTE — ANESTHESIA POSTPROCEDURE EVALUATION
Patient: Edward Wolfe    Procedure Summary       Date: 10/20/23 Room / Location: [facility] OR 14 / [facility]    Anesthesia Start: 0727 Anesthesia Stop: 0920    Procedures:       Bronchoscopy Flexible (Right: Chest)      Added due to Bronchoscopy can not be scheduled as a standalone case (Right) Diagnosis:       Lymph node enlargement      (Lymph node enlargement [R59.9])    Surgeons: Anil Whitaker MD Responsible Provider: Hipolito Thurston MD    Anesthesia Type: general ASA Status: 3            Anesthesia Type: general    Vitals Value Taken Time   /74 10/20/23 0945   Temp 36.6 °C (97.9 °F) 10/20/23 0915   Pulse 63 10/20/23 0959   Resp 5 10/20/23 0936   SpO2 93 % 10/20/23 0959   Vitals shown include unvalidated device data.    Anesthesia Post Evaluation    Patient location during evaluation: PACU  Patient participation: complete - patient participated  Level of consciousness: sleepy but conscious  Pain score: 0  Pain management: adequate  Airway patency: patent  Cardiovascular status: acceptable and stable  Respiratory status: acceptable and room air  Hydration status: acceptable        There were no known notable events for this encounter.

## 2023-10-23 ASSESSMENT — PAIN SCALES - GENERAL: PAINLEVEL_OUTOF10: 3

## 2023-10-27 LAB
LABORATORY COMMENT REPORT: NORMAL
LABORATORY COMMENT REPORT: NORMAL
PATH REPORT.FINAL DX SPEC: NORMAL
PATH REPORT.GROSS SPEC: NORMAL
PATH REPORT.INTRAOP OBS SPEC DOC: NORMAL
PATH REPORT.RELEVANT HX SPEC: NORMAL
PATH REPORT.TOTAL CANCER: NORMAL

## 2023-11-01 ENCOUNTER — PREP FOR PROCEDURE (OUTPATIENT)
Dept: PREOP | Facility: HOSPITAL | Age: 48
End: 2023-11-01
Payer: COMMERCIAL

## 2023-11-01 DIAGNOSIS — R59.9 ADENOPATHY: Primary | ICD-10-CM

## 2023-11-01 RX ORDER — GABAPENTIN 300 MG/1
300 CAPSULE ORAL ONCE
Status: CANCELLED | OUTPATIENT
Start: 2023-11-01 | End: 2023-11-01

## 2023-11-01 RX ORDER — CEFAZOLIN SODIUM 2 G/100ML
2 INJECTION, SOLUTION INTRAVENOUS ONCE
Status: CANCELLED | OUTPATIENT
Start: 2023-11-07 | End: 2023-11-01

## 2023-11-01 RX ORDER — ACETAMINOPHEN 325 MG/1
975 TABLET ORAL ONCE
Status: CANCELLED | OUTPATIENT
Start: 2023-11-01 | End: 2023-11-01

## 2023-11-01 RX ORDER — HEPARIN SODIUM 5000 [USP'U]/ML
5000 INJECTION, SOLUTION INTRAVENOUS; SUBCUTANEOUS ONCE
Status: CANCELLED | OUTPATIENT
Start: 2023-11-01

## 2023-11-01 NOTE — PROGRESS NOTES
Subjective   Edward Wolfe  is a 48 y.o. male who presents for evaluation of anterior mediastinal mass and mediastinal adenopathy status post EBUS on 10/20/23.     This patient received a CT calcium scoring test on 7/23/2021 which demonstrated several abnormalities and he was referred for a CT scan of the chest which was performed 8/11/2021. He was scheduled for 1 year follow-up, but did not receive a repeat CT until 7/13/2023. That CT scan showed interval increase of an irregular mass in the anterior mediastinum with associated calcifications as well as prominent mediastinal adenopathy which was concerning for malignancy.  He received a CT-guided biopsy which showed atypical cells, but nondiagnostic.  I recommended an EBUS.  This was performed on 10/20/2023      Currently the patient is in their usual state of health. They deny the following symptoms: chest pain, cough, shortness of breath at rest, shortness of breath with activity, fevers, chills, weight loss, abdominal pain, difficulty swallowing, vomiting, and changes to mental status.      There have been no significant changes to their documented medical, surgical and family history.      Cancer-related family history is not on file.  Social History    Tobacco Use      Smoking status: Never      Smokeless tobacco: Never    Objective   Physical Exam  Phone visit  Diagnostic Studies  I have reviewed a biopsy result, which showed atypical cells but no obvious evidence of malignancy.    Assessment/Plan   Overall, I believe that the patient has concerning findings and requires additional workup.     I am concerned this patient has a diagnosis of malignancy, but his current cytology based biopsies have been inadequate to diagnose this.  I recommended mediastinoscopy.  I described the risks, benefits, and alternatives to mediastinoscopy in detail.  The patient expressed understanding was amenable to proceeding.    I recommend surgery.  Mediastinoscopy at [place]  [facility] 11/7/23 .     I discussed this in detail with the patient, including a discussion of alternatives. They were comfortable with this approach.     Anil Whitaker MD  [phone]

## 2023-11-01 NOTE — H&P (VIEW-ONLY)
Subjective   Edward Wolfe  is a 48 y.o. male who presents for evaluation of anterior mediastinal mass and mediastinal adenopathy status post EBUS on 10/20/23.     This patient received a CT calcium scoring test on 7/23/2021 which demonstrated several abnormalities and he was referred for a CT scan of the chest which was performed 8/11/2021. He was scheduled for 1 year follow-up, but did not receive a repeat CT until 7/13/2023. That CT scan showed interval increase of an irregular mass in the anterior mediastinum with associated calcifications as well as prominent mediastinal adenopathy which was concerning for malignancy.  He received a CT-guided biopsy which showed atypical cells, but nondiagnostic.  I recommended an EBUS.  This was performed on 10/20/2023      Currently the patient is in their usual state of health. They deny the following symptoms: chest pain, cough, shortness of breath at rest, shortness of breath with activity, fevers, chills, weight loss, abdominal pain, difficulty swallowing, vomiting, and changes to mental status.      There have been no significant changes to their documented medical, surgical and family history.      Cancer-related family history is not on file.  Social History    Tobacco Use      Smoking status: Never      Smokeless tobacco: Never    Objective   Physical Exam  Phone visit  Diagnostic Studies  I have reviewed a biopsy result, which showed atypical cells but no obvious evidence of malignancy.    Assessment/Plan   Overall, I believe that the patient has concerning findings and requires additional workup.     I am concerned this patient has a diagnosis of malignancy, but his current cytology based biopsies have been inadequate to diagnose this.  I recommended mediastinoscopy.  I described the risks, benefits, and alternatives to mediastinoscopy in detail.  The patient expressed understanding was amenable to proceeding.    I recommend surgery.  Mediastinoscopy at [facility] 11/7/23 .     I discussed this in detail with the patient, including a discussion of alternatives. They were comfortable with this approach.     Anil Whitaker MD  [phone]

## 2023-11-02 ENCOUNTER — TELEMEDICINE (OUTPATIENT)
Dept: SURGERY | Facility: HOSPITAL | Age: 48
End: 2023-11-02
Payer: COMMERCIAL

## 2023-11-02 DIAGNOSIS — R59.9 LYMPH NODE ENLARGEMENT: ICD-10-CM

## 2023-11-02 DIAGNOSIS — R91.8 PULMONARY NODULES: Primary | ICD-10-CM

## 2023-11-02 PROCEDURE — 99215 OFFICE O/P EST HI 40 MIN: CPT | Performed by: THORACIC SURGERY (CARDIOTHORACIC VASCULAR SURGERY)

## 2023-11-03 PROBLEM — R59.9 ADENOPATHY: Status: ACTIVE | Noted: 2023-11-01

## 2023-11-07 ENCOUNTER — ANESTHESIA (OUTPATIENT)
Dept: OPERATING ROOM | Facility: HOSPITAL | Age: 48
End: 2023-11-07
Payer: COMMERCIAL

## 2023-11-07 ENCOUNTER — HOSPITAL ENCOUNTER (OUTPATIENT)
Dept: CARDIOLOGY | Facility: HOSPITAL | Age: 48
Discharge: HOME | End: 2023-11-07
Payer: COMMERCIAL

## 2023-11-07 ENCOUNTER — PRE-ADMISSION TESTING (OUTPATIENT)
Dept: PREADMISSION TESTING | Facility: HOSPITAL | Age: 48
End: 2023-11-07
Payer: COMMERCIAL

## 2023-11-07 ENCOUNTER — ANESTHESIA EVENT (OUTPATIENT)
Dept: OPERATING ROOM | Facility: HOSPITAL | Age: 48
End: 2023-11-07
Payer: COMMERCIAL

## 2023-11-07 ENCOUNTER — HOSPITAL ENCOUNTER (OUTPATIENT)
Facility: HOSPITAL | Age: 48
Setting detail: OUTPATIENT SURGERY
Discharge: HOME | End: 2023-11-07
Attending: THORACIC SURGERY (CARDIOTHORACIC VASCULAR SURGERY) | Admitting: THORACIC SURGERY (CARDIOTHORACIC VASCULAR SURGERY)
Payer: COMMERCIAL

## 2023-11-07 VITALS
DIASTOLIC BLOOD PRESSURE: 77 MMHG | HEART RATE: 72 BPM | SYSTOLIC BLOOD PRESSURE: 130 MMHG | TEMPERATURE: 97 F | OXYGEN SATURATION: 93 % | RESPIRATION RATE: 16 BRPM

## 2023-11-07 VITALS
HEART RATE: 75 BPM | OXYGEN SATURATION: 95 % | WEIGHT: 238.32 LBS | SYSTOLIC BLOOD PRESSURE: 129 MMHG | HEIGHT: 68 IN | TEMPERATURE: 98.4 F | RESPIRATION RATE: 18 BRPM | DIASTOLIC BLOOD PRESSURE: 81 MMHG | BODY MASS INDEX: 36.12 KG/M2

## 2023-11-07 DIAGNOSIS — Z01.818 PREOP TESTING: Primary | ICD-10-CM

## 2023-11-07 DIAGNOSIS — Z41.9 ELECTIVE SURGERY: Primary | ICD-10-CM

## 2023-11-07 DIAGNOSIS — Z41.9 ELECTIVE SURGERY: ICD-10-CM

## 2023-11-07 DIAGNOSIS — R59.9 ADENOPATHY: Primary | ICD-10-CM

## 2023-11-07 LAB
ABO GROUP (TYPE) IN BLOOD: NORMAL
ABO GROUP (TYPE) IN BLOOD: NORMAL
ANTIBODY SCREEN: NORMAL
RH FACTOR (ANTIGEN D): NORMAL
RH FACTOR (ANTIGEN D): NORMAL

## 2023-11-07 PROCEDURE — 88307 TISSUE EXAM BY PATHOLOGIST: CPT | Performed by: PATHOLOGY

## 2023-11-07 PROCEDURE — 2500000004 HC RX 250 GENERAL PHARMACY W/ HCPCS (ALT 636 FOR OP/ED): Performed by: THORACIC SURGERY (CARDIOTHORACIC VASCULAR SURGERY)

## 2023-11-07 PROCEDURE — 7100000009 HC PHASE TWO TIME - INITIAL BASE CHARGE: Performed by: THORACIC SURGERY (CARDIOTHORACIC VASCULAR SURGERY)

## 2023-11-07 PROCEDURE — 93005 ELECTROCARDIOGRAM TRACING: CPT

## 2023-11-07 PROCEDURE — 88189 FLOWCYTOMETRY/READ 16 & >: CPT | Performed by: PATHOLOGY

## 2023-11-07 PROCEDURE — 2500000005 HC RX 250 GENERAL PHARMACY W/O HCPCS: Performed by: THORACIC SURGERY (CARDIOTHORACIC VASCULAR SURGERY)

## 2023-11-07 PROCEDURE — 7100000010 HC PHASE TWO TIME - EACH INCREMENTAL 1 MINUTE: Performed by: THORACIC SURGERY (CARDIOTHORACIC VASCULAR SURGERY)

## 2023-11-07 PROCEDURE — 3600000003 HC OR TIME - INITIAL BASE CHARGE - PROCEDURE LEVEL THREE: Performed by: THORACIC SURGERY (CARDIOTHORACIC VASCULAR SURGERY)

## 2023-11-07 PROCEDURE — 96372 THER/PROPH/DIAG INJ SC/IM: CPT | Mod: 59

## 2023-11-07 PROCEDURE — 88307 TISSUE EXAM BY PATHOLOGIST: CPT | Mod: TC,PARLAB | Performed by: THORACIC SURGERY (CARDIOTHORACIC VASCULAR SURGERY)

## 2023-11-07 PROCEDURE — 2500000004 HC RX 250 GENERAL PHARMACY W/ HCPCS (ALT 636 FOR OP/ED)

## 2023-11-07 PROCEDURE — 88305 TISSUE EXAM BY PATHOLOGIST: CPT | Performed by: PATHOLOGY

## 2023-11-07 PROCEDURE — 36415 COLL VENOUS BLD VENIPUNCTURE: CPT | Performed by: THORACIC SURGERY (CARDIOTHORACIC VASCULAR SURGERY)

## 2023-11-07 PROCEDURE — 99203 OFFICE O/P NEW LOW 30 MIN: CPT | Performed by: NURSE PRACTITIONER

## 2023-11-07 PROCEDURE — 86850 RBC ANTIBODY SCREEN: CPT | Performed by: THORACIC SURGERY (CARDIOTHORACIC VASCULAR SURGERY)

## 2023-11-07 PROCEDURE — A39402 PR MEDIASTINOSCOPY WITH LYMPH NODE BIOPSY/IES: Performed by: ANESTHESIOLOGY

## 2023-11-07 PROCEDURE — 88305 TISSUE EXAM BY PATHOLOGIST: CPT | Mod: TC,SUR,PARLAB | Performed by: THORACIC SURGERY (CARDIOTHORACIC VASCULAR SURGERY)

## 2023-11-07 PROCEDURE — 2720000007 HC OR 272 NO HCPCS: Performed by: THORACIC SURGERY (CARDIOTHORACIC VASCULAR SURGERY)

## 2023-11-07 PROCEDURE — 2500000005 HC RX 250 GENERAL PHARMACY W/O HCPCS

## 2023-11-07 PROCEDURE — 7100000002 HC RECOVERY ROOM TIME - EACH INCREMENTAL 1 MINUTE: Performed by: THORACIC SURGERY (CARDIOTHORACIC VASCULAR SURGERY)

## 2023-11-07 PROCEDURE — A39402 PR MEDIASTINOSCOPY WITH LYMPH NODE BIOPSY/IES: Performed by: NURSE ANESTHETIST, CERTIFIED REGISTERED

## 2023-11-07 PROCEDURE — 3600000008 HC OR TIME - EACH INCREMENTAL 1 MINUTE - PROCEDURE LEVEL THREE: Performed by: THORACIC SURGERY (CARDIOTHORACIC VASCULAR SURGERY)

## 2023-11-07 PROCEDURE — 88365 INSITU HYBRIDIZATION (FISH): CPT | Performed by: PATHOLOGY

## 2023-11-07 PROCEDURE — 39402 MEDIASTINOSCPY W/LMPH NOD BX: CPT | Performed by: THORACIC SURGERY (CARDIOTHORACIC VASCULAR SURGERY)

## 2023-11-07 PROCEDURE — 2500000004 HC RX 250 GENERAL PHARMACY W/ HCPCS (ALT 636 FOR OP/ED): Performed by: NURSE PRACTITIONER

## 2023-11-07 PROCEDURE — 88185 FLOWCYTOMETRY/TC ADD-ON: CPT | Mod: TC | Performed by: THORACIC SURGERY (CARDIOTHORACIC VASCULAR SURGERY)

## 2023-11-07 PROCEDURE — 3700000001 HC GENERAL ANESTHESIA TIME - INITIAL BASE CHARGE: Performed by: THORACIC SURGERY (CARDIOTHORACIC VASCULAR SURGERY)

## 2023-11-07 PROCEDURE — 2500000004 HC RX 250 GENERAL PHARMACY W/ HCPCS (ALT 636 FOR OP/ED): Performed by: ANESTHESIOLOGY

## 2023-11-07 PROCEDURE — 86920 COMPATIBILITY TEST SPIN: CPT

## 2023-11-07 PROCEDURE — 2500000001 HC RX 250 WO HCPCS SELF ADMINISTERED DRUGS (ALT 637 FOR MEDICARE OP)

## 2023-11-07 PROCEDURE — 7100000001 HC RECOVERY ROOM TIME - INITIAL BASE CHARGE: Performed by: THORACIC SURGERY (CARDIOTHORACIC VASCULAR SURGERY)

## 2023-11-07 PROCEDURE — A4217 STERILE WATER/SALINE, 500 ML: HCPCS | Performed by: THORACIC SURGERY (CARDIOTHORACIC VASCULAR SURGERY)

## 2023-11-07 PROCEDURE — 88342 IMHCHEM/IMCYTCHM 1ST ANTB: CPT | Performed by: PATHOLOGY

## 2023-11-07 PROCEDURE — 88341 IMHCHEM/IMCYTCHM EA ADD ANTB: CPT | Performed by: PATHOLOGY

## 2023-11-07 PROCEDURE — 3700000002 HC GENERAL ANESTHESIA TIME - EACH INCREMENTAL 1 MINUTE: Performed by: THORACIC SURGERY (CARDIOTHORACIC VASCULAR SURGERY)

## 2023-11-07 PROCEDURE — 36415 COLL VENOUS BLD VENIPUNCTURE: CPT

## 2023-11-07 RX ORDER — SODIUM CHLORIDE 9 MG/ML
100 INJECTION, SOLUTION INTRAVENOUS CONTINUOUS
Status: DISCONTINUED | OUTPATIENT
Start: 2023-11-07 | End: 2023-11-07 | Stop reason: HOSPADM

## 2023-11-07 RX ORDER — GABAPENTIN 300 MG/1
CAPSULE ORAL
Status: COMPLETED
Start: 2023-11-07 | End: 2023-11-07

## 2023-11-07 RX ORDER — LABETALOL HYDROCHLORIDE 5 MG/ML
5 INJECTION, SOLUTION INTRAVENOUS ONCE AS NEEDED
Status: DISCONTINUED | OUTPATIENT
Start: 2023-11-07 | End: 2023-11-07 | Stop reason: HOSPADM

## 2023-11-07 RX ORDER — OXYCODONE HYDROCHLORIDE 5 MG/1
5 TABLET ORAL EVERY 6 HOURS PRN
Qty: 15 TABLET | Refills: 0 | Status: SHIPPED | OUTPATIENT
Start: 2023-11-07 | End: 2023-11-12

## 2023-11-07 RX ORDER — LIDOCAINE HYDROCHLORIDE 20 MG/ML
INJECTION, SOLUTION INFILTRATION; PERINEURAL AS NEEDED
Status: DISCONTINUED | OUTPATIENT
Start: 2023-11-07 | End: 2023-11-07

## 2023-11-07 RX ORDER — ACETAMINOPHEN 325 MG/1
975 TABLET ORAL ONCE
Status: COMPLETED | OUTPATIENT
Start: 2023-11-07 | End: 2023-11-07

## 2023-11-07 RX ORDER — ACETAMINOPHEN 325 MG/1
TABLET ORAL
Status: COMPLETED
Start: 2023-11-07 | End: 2023-11-07

## 2023-11-07 RX ORDER — CEFAZOLIN SODIUM 2 G/100ML
2 INJECTION, SOLUTION INTRAVENOUS ONCE
Status: COMPLETED | OUTPATIENT
Start: 2023-11-07 | End: 2023-11-07

## 2023-11-07 RX ORDER — DEXAMETHASONE SODIUM PHOSPHATE 4 MG/ML
INJECTION, SOLUTION INTRA-ARTICULAR; INTRALESIONAL; INTRAMUSCULAR; INTRAVENOUS; SOFT TISSUE AS NEEDED
Status: DISCONTINUED | OUTPATIENT
Start: 2023-11-07 | End: 2023-11-07

## 2023-11-07 RX ORDER — SODIUM CHLORIDE, SODIUM LACTATE, POTASSIUM CHLORIDE, CALCIUM CHLORIDE 600; 310; 30; 20 MG/100ML; MG/100ML; MG/100ML; MG/100ML
100 INJECTION, SOLUTION INTRAVENOUS CONTINUOUS
Status: DISCONTINUED | OUTPATIENT
Start: 2023-11-07 | End: 2023-11-07 | Stop reason: HOSPADM

## 2023-11-07 RX ORDER — HEPARIN SODIUM 5000 [USP'U]/ML
INJECTION, SOLUTION INTRAVENOUS; SUBCUTANEOUS
Status: COMPLETED
Start: 2023-11-07 | End: 2023-11-07

## 2023-11-07 RX ORDER — HYDRALAZINE HYDROCHLORIDE 20 MG/ML
5 INJECTION INTRAMUSCULAR; INTRAVENOUS EVERY 30 MIN PRN
Status: DISCONTINUED | OUTPATIENT
Start: 2023-11-07 | End: 2023-11-07 | Stop reason: HOSPADM

## 2023-11-07 RX ORDER — GABAPENTIN 300 MG/1
300 CAPSULE ORAL ONCE
Status: COMPLETED | OUTPATIENT
Start: 2023-11-07 | End: 2023-11-07

## 2023-11-07 RX ORDER — DIPHENHYDRAMINE HYDROCHLORIDE 50 MG/ML
12.5 INJECTION INTRAMUSCULAR; INTRAVENOUS ONCE AS NEEDED
Status: DISCONTINUED | OUTPATIENT
Start: 2023-11-07 | End: 2023-11-07 | Stop reason: HOSPADM

## 2023-11-07 RX ORDER — CEFAZOLIN SODIUM 2 G/100ML
INJECTION, SOLUTION INTRAVENOUS AS NEEDED
Status: DISCONTINUED | OUTPATIENT
Start: 2023-11-07 | End: 2023-11-07

## 2023-11-07 RX ORDER — ACETAMINOPHEN 325 MG/1
650 TABLET ORAL EVERY 4 HOURS PRN
Status: DISCONTINUED | OUTPATIENT
Start: 2023-11-07 | End: 2023-11-07 | Stop reason: HOSPADM

## 2023-11-07 RX ORDER — HYDROMORPHONE HYDROCHLORIDE 1 MG/ML
1 INJECTION, SOLUTION INTRAMUSCULAR; INTRAVENOUS; SUBCUTANEOUS EVERY 5 MIN PRN
Status: DISCONTINUED | OUTPATIENT
Start: 2023-11-07 | End: 2023-11-07 | Stop reason: HOSPADM

## 2023-11-07 RX ORDER — MEPERIDINE HYDROCHLORIDE 25 MG/ML
12.5 INJECTION INTRAMUSCULAR; INTRAVENOUS; SUBCUTANEOUS EVERY 10 MIN PRN
Status: DISCONTINUED | OUTPATIENT
Start: 2023-11-07 | End: 2023-11-07 | Stop reason: HOSPADM

## 2023-11-07 RX ORDER — MIDAZOLAM HYDROCHLORIDE 1 MG/ML
INJECTION, SOLUTION INTRAMUSCULAR; INTRAVENOUS AS NEEDED
Status: DISCONTINUED | OUTPATIENT
Start: 2023-11-07 | End: 2023-11-07

## 2023-11-07 RX ORDER — PROMETHAZINE HYDROCHLORIDE 50 MG/ML
12.5 INJECTION, SOLUTION INTRAMUSCULAR; INTRAVENOUS ONCE AS NEEDED
Status: DISCONTINUED | OUTPATIENT
Start: 2023-11-07 | End: 2023-11-07 | Stop reason: HOSPADM

## 2023-11-07 RX ORDER — ONDANSETRON HYDROCHLORIDE 2 MG/ML
INJECTION, SOLUTION INTRAVENOUS AS NEEDED
Status: DISCONTINUED | OUTPATIENT
Start: 2023-11-07 | End: 2023-11-07

## 2023-11-07 RX ORDER — ONDANSETRON HYDROCHLORIDE 2 MG/ML
4 INJECTION, SOLUTION INTRAVENOUS ONCE AS NEEDED
Status: DISCONTINUED | OUTPATIENT
Start: 2023-11-07 | End: 2023-11-07 | Stop reason: HOSPADM

## 2023-11-07 RX ORDER — MIDAZOLAM HYDROCHLORIDE 1 MG/ML
1 INJECTION, SOLUTION INTRAMUSCULAR; INTRAVENOUS ONCE AS NEEDED
Status: DISCONTINUED | OUTPATIENT
Start: 2023-11-07 | End: 2023-11-07 | Stop reason: HOSPADM

## 2023-11-07 RX ORDER — BUPIVACAINE HYDROCHLORIDE 2.5 MG/ML
INJECTION, SOLUTION INFILTRATION; PERINEURAL AS NEEDED
Status: DISCONTINUED | OUTPATIENT
Start: 2023-11-07 | End: 2023-11-07 | Stop reason: HOSPADM

## 2023-11-07 RX ORDER — SODIUM CHLORIDE 0.9 G/100ML
IRRIGANT IRRIGATION AS NEEDED
Status: DISCONTINUED | OUTPATIENT
Start: 2023-11-07 | End: 2023-11-07 | Stop reason: HOSPADM

## 2023-11-07 RX ORDER — ROCURONIUM BROMIDE 10 MG/ML
INJECTION, SOLUTION INTRAVENOUS AS NEEDED
Status: DISCONTINUED | OUTPATIENT
Start: 2023-11-07 | End: 2023-11-07

## 2023-11-07 RX ORDER — PROPOFOL 10 MG/ML
INJECTION, EMULSION INTRAVENOUS AS NEEDED
Status: DISCONTINUED | OUTPATIENT
Start: 2023-11-07 | End: 2023-11-07

## 2023-11-07 RX ORDER — HEPARIN SODIUM 5000 [USP'U]/ML
5000 INJECTION, SOLUTION INTRAVENOUS; SUBCUTANEOUS ONCE
Status: COMPLETED | OUTPATIENT
Start: 2023-11-07 | End: 2023-11-07

## 2023-11-07 RX ORDER — FENTANYL CITRATE 50 UG/ML
INJECTION, SOLUTION INTRAMUSCULAR; INTRAVENOUS AS NEEDED
Status: DISCONTINUED | OUTPATIENT
Start: 2023-11-07 | End: 2023-11-07

## 2023-11-07 RX ADMIN — ACETAMINOPHEN 975 MG: 325 TABLET ORAL at 09:25

## 2023-11-07 RX ADMIN — HEPARIN SODIUM 5000 UNITS: 5000 INJECTION INTRAVENOUS; SUBCUTANEOUS at 09:29

## 2023-11-07 RX ADMIN — MIDAZOLAM 2 MG: 1 INJECTION INTRAMUSCULAR; INTRAVENOUS at 10:48

## 2023-11-07 RX ADMIN — FENTANYL CITRATE 50 MCG: 50 INJECTION, SOLUTION INTRAMUSCULAR; INTRAVENOUS at 11:10

## 2023-11-07 RX ADMIN — ONDANSETRON 4 MG: 2 INJECTION INTRAMUSCULAR; INTRAVENOUS at 11:53

## 2023-11-07 RX ADMIN — SUGAMMADEX 200 MG: 100 INJECTION, SOLUTION INTRAVENOUS at 12:02

## 2023-11-07 RX ADMIN — HEPARIN SODIUM 5000 UNITS: 5000 INJECTION, SOLUTION INTRAVENOUS; SUBCUTANEOUS at 09:29

## 2023-11-07 RX ADMIN — CEFAZOLIN SODIUM 2 G: 2 INJECTION, SOLUTION INTRAVENOUS at 09:28

## 2023-11-07 RX ADMIN — GABAPENTIN 300 MG: 300 CAPSULE ORAL at 09:27

## 2023-11-07 RX ADMIN — FENTANYL CITRATE 50 MCG: 50 INJECTION, SOLUTION INTRAMUSCULAR; INTRAVENOUS at 11:05

## 2023-11-07 RX ADMIN — DEXAMETHASONE SODIUM PHOSPHATE 8 MG: 4 INJECTION, SOLUTION INTRAMUSCULAR; INTRAVENOUS at 11:00

## 2023-11-07 RX ADMIN — HYDROMORPHONE HYDROCHLORIDE 0.5 MG: 1 INJECTION, SOLUTION INTRAMUSCULAR; INTRAVENOUS; SUBCUTANEOUS at 12:57

## 2023-11-07 RX ADMIN — ROCURONIUM BROMIDE 50 MG: 10 INJECTION, SOLUTION INTRAVENOUS at 10:50

## 2023-11-07 RX ADMIN — PROPOFOL 200 MG: 10 INJECTION, EMULSION INTRAVENOUS at 10:50

## 2023-11-07 RX ADMIN — LIDOCAINE HYDROCHLORIDE 60 MG: 20 INJECTION, SOLUTION INFILTRATION; PERINEURAL at 10:50

## 2023-11-07 RX ADMIN — CEFAZOLIN SODIUM 2 G: 2 INJECTION, SOLUTION INTRAVENOUS at 10:59

## 2023-11-07 RX ADMIN — FENTANYL CITRATE 100 MCG: 50 INJECTION, SOLUTION INTRAMUSCULAR; INTRAVENOUS at 10:50

## 2023-11-07 RX ADMIN — SODIUM CHLORIDE 100 ML/HR: 9 INJECTION, SOLUTION INTRAVENOUS at 09:27

## 2023-11-07 RX ADMIN — ROCURONIUM BROMIDE 20 MG: 10 INJECTION, SOLUTION INTRAVENOUS at 11:24

## 2023-11-07 SDOH — HEALTH STABILITY: MENTAL HEALTH: CURRENT SMOKER: 0

## 2023-11-07 ASSESSMENT — PAIN SCALES - GENERAL
PAINLEVEL_OUTOF10: 0 - NO PAIN
PAIN_LEVEL: 2
PAINLEVEL_OUTOF10: 0 - NO PAIN
PAINLEVEL_OUTOF10: 2
PAINLEVEL_OUTOF10: 0 - NO PAIN
PAINLEVEL_OUTOF10: 5 - MODERATE PAIN
PAINLEVEL_OUTOF10: 0 - NO PAIN

## 2023-11-07 ASSESSMENT — DUKE ACTIVITY SCORE INDEX (DASI)
CAN YOU CLIMB A FLIGHT OF STAIRS OR WALK UP A HILL: YES
CAN YOU HAVE SEXUAL RELATIONS: YES
CAN YOU PARTICIPATE IN MODERATE RECREATIONAL ACTIVITIES LIKE GOLF, BOWLING, DANCING, DOUBLES TENNIS OR THROWING A BASEBALL OR FOOTBALL: YES
CAN YOU RUN A SHORT DISTANCE: YES
CAN YOU PARTICIPATE IN STRENOUS SPORTS LIKE SWIMMING, SINGLES TENNIS, FOOTBALL, BASKETBALL, OR SKIING: YES
DASI METS SCORE: 9.9
CAN YOU WALK A BLOCK OR TWO ON LEVEL GROUND: YES
CAN YOU DO HEAVY WORK AROUND THE HOUSE LIKE SCRUBBING FLOORS OR LIFTING AND MOVING HEAVY FURNITURE: YES
CAN YOU DO LIGHT WORK AROUND THE HOUSE LIKE DUSTING OR WASHING DISHES: YES
CAN YOU DO MODERATE WORK AROUND THE HOUSE LIKE VACUUMING, SWEEPING FLOORS OR CARRYING GROCERIES: YES
TOTAL_SCORE: 58.2
CAN YOU WALK INDOORS, SUCH AS AROUND YOUR HOUSE: YES
CAN YOU TAKE CARE OF YOURSELF (EAT, DRESS, BATHE, OR USE TOILET): YES
CAN YOU DO YARD WORK LIKE RAKING LEAVES, WEEDING OR PUSHING A MOWER: YES

## 2023-11-07 ASSESSMENT — PAIN - FUNCTIONAL ASSESSMENT
PAIN_FUNCTIONAL_ASSESSMENT: 0-10
PAIN_FUNCTIONAL_ASSESSMENT: 0-10

## 2023-11-07 NOTE — ANESTHESIA PREPROCEDURE EVALUATION
Patient: Edward Wolfe    Procedure Information       Date/Time: 11/07/23 0930    Procedure: FLEXIBLE BRONCHOSCOPY/ MEDIASTINOSCOPY - Flexible bronchoscopy, mediastinoscopy    Location: [facility] 04 / [facility]    Surgeons: Anil Whitaker MD            Relevant Problems   Cardiovascular   (+) Hypercholesterolemia   (+) Hypertension      Endocrine   (+) Obesity      GI   (+) Gastroesophageal reflux disease      Neuro/Psych   (+) Anxiety      Pulmonary   (+) BESSIE (obstructive sleep apnea)   (+) Pulmonary nodules      Hematology   (+) Anemia       Clinical information reviewed:   Tobacco  Allergies  Meds   Med Hx  Surg Hx   Fam Hx          NPO Detail:  NPO/Void Status  Date of Last Liquid: 11/06/23  Time of Last Liquid: 0000  Date of Last Solid: 11/06/23  Time of Last Solid: 0000         Physical Exam    Airway  Mallampati: III  TM distance: >3 FB  Neck ROM: full     Cardiovascular - normal exam  Rhythm: regular  Rate: normal     Dental - normal exam     Pulmonary - normal exam     Abdominal            Anesthesia Plan    ASA 3     general     The patient is not a current smoker.    intravenous induction   Anesthetic plan and risks discussed with patient.    Plan discussed with CRNA.

## 2023-11-07 NOTE — CPM/PAT H&P
"CPM/PAT Evaluation       Name: Edward Wolfe (Edward Wolfe)  /Age: 1975/48 y.o.     In-Person       Chief Complaint: chest mass    48 yr old male with c/o chest mass.  Reports having routine chest scan which found a \"mass by thymus\" led to further diagnostic testing including biopsy.  Adding was found \"atypical cells in lymph nodes in chest and prior biopsy were in conclusive\".  Denies chest pain, congestion, cough, shortness of breathe on excertion and does not smoke. Reports remaining otherwise physically active, exercise routinely; denies cardiac or respiratory symptoms.  Denies past issues with anesthesia.         Past Medical History:   Diagnosis Date    Contusion of right hand, initial encounter 2018    Contusion of right hand, initial encounter    Disruption of wound, unspecified, initial encounter 2017    Dehiscence of wound of skin    Laceration without foreign body of left hand, initial encounter 2017    Laceration of hand, left    Laceration without foreign body of left hand, initial encounter 2017    Laceration of hand, left    Left testicular pain 2017    Testicular pain, left    Other abnormal findings in urine     Leukocytes in urine    Other injury of unspecified body region, initial encounter 2017    Infected laceration of skin    Other specified soft tissue disorders 2017    Soft tissue mass    Pain in right hand 2018    Hand pain, right    Personal history of diseases of the skin and subcutaneous tissue 2019    History of folliculitis    Personal history of diseases of the skin and subcutaneous tissue 2019    History of folliculitis    Personal history of other diseases of the circulatory system 2019    History of lymphadenitis    Personal history of other mental and behavioral disorders 2014    History of depression    Personal history of other specified conditions 2017    History of abdominal pain       Past " Surgical History:   Procedure Laterality Date    APPENDECTOMY  08/18/2014    Appendectomy    JOINT REPLACEMENT      MOUTH SURGERY  01/11/2016    Oral Surgery Tooth Extraction    OTHER SURGICAL HISTORY  01/22/2020    Rotator cuff repair    OTHER SURGICAL HISTORY  02/18/2021    Hip surgery    SHOULDER SURGERY  08/18/2014    Shoulder Surgery    US GUIDED BIOPSY LYMPH NODE SUPERFICIAL  10/9/2023    US GUIDED BIOPSY LYMPH NODE SUPERFICIAL 10/9/2023 [facility]       Patient  has no history on file for sexual activity.    No family history on file.    Allergies   Allergen Reactions    Poison Ivy Hives       Prior to Admission medications    Medication Sig Start Date End Date Taking? Authorizing Provider   atorvastatin (Lipitor) 40 mg tablet TAKE 1 TABLET BY MOUTH EVERY DAY AS DIRECTED. 6/8/23   Nellie Harrison DO   gemfibrozil (Lopid) 600 mg tablet TAKE 1 TABLET BY MOUTH EVERY DAY. 6/8/23   Nellie Harrison DO   ibuprofen 200 mg tablet Take 1 tablet (200 mg) by mouth every 8 hours if needed for mild pain (1 - 3).    Historical Provider, MD   lisinopril 10 mg tablet TAKE 1 TABLET BY MOUTH EVERY DAY. 6/8/23   Nellie Harrison DO   omeprazole (PriLOSEC) 40 mg DR capsule Take 1 capsule (40 mg) by mouth once daily. 10/9/23   Historical Provider, MD   sertraline (Zoloft) 100 mg tablet Take 1.5 tablets (150 mg) by mouth once daily. 6/8/23   Nellie Harrison DO        Review of Systems  Constitutional: no fever, no chills and not feeling poorly.   Eyes: no eyesight problems.   ENT: no hearing loss, no nosebleeds and no sore throat.   Cardiovascular: no chest pain, no palpitations and no extremity edema.   Respiratory: no shortness of breath, no wheezing, no cough and no shortness of breath during exertion.   Gastrointestinal: no abdominal pain, no constipation, no heartburn, no diarrhea, no vomiting and no blood in stools.   Genitourinary: no dysuria, no incontinence, normal micturition and no testicular pain.   Musculoskeletal:  no arthralgias and no gait abnormality.   Integumentary: no skin lesions, no skin wound and no change in a mole.   Neurological: no confusion, no dizziness, no fainting and no difficulty walking.   Psychiatric: not suicidal, no anxiety and no depression.   All other systems have been reviewed and are negative for complaint.     Physical Exam  Constitutional:       General: No acute distress.     Aox3, pleasant and cooperative, appropriate mood and eye contact   HENT:      Head: Normocephalic.      Mouth/Throat: Mucous membranes moist & pink  Eyes:      Vision grossly intact, PERRLA, corrective lenses in use   Neck:      No carotid bruit, no JVD  Cardiovascular:      RRR, S1S2, no murmurs, rubs or gallops  Pulmonary:      Symmetric chest expansion, CTA, Room Air  Abdominal:      Soft non-tender, BSx4   Skin:     Warm, dry & intact   Extremities:      No gross deformities; normal gait   Neurological:      No focal deficit, Aox3, BLACKMON x4  Psychiatric:      Pleasant & cooperative, appropriate affect     PAT AIRWAY:   Airway:     Mallampati::  III    TM distance::  <3 FB    Neck ROM::  Full  normal        Visit Vitals  /81   Pulse 75   Temp 36.9 °C (98.4 °F) (Temporal)   Resp 18       DASI Risk Score      Flowsheet Row Most Recent Value   DASI SCORE 58.2   METS Score (Will be calculated only when all the questions are answered) 9.9          Caprini DVT Assessment    No data to display       Modified Frailty Index    No data to display       CHADS2 Stroke Risk  Current as of 10 minutes ago        N/A 3 - 100%: High Risk   2 - 3%: Medium Risk   0 - 2%: Low Risk     Last Change: N/A          This score determines the patient's risk of having a stroke if the patient has atrial fibrillation.        This score is not applicable to this patient. Components are not calculated.          Revised Cardiac Risk Index    No data to display       Apfel Simplified Score    No data to display       Risk Analysis Index Results This  Encounter    No data found in the last 1 encounters.       Stop Bang Score      Flowsheet Row Most Recent Value   Do you snore loudly? 1   Do you often feel tired or fatigued after your sleep? 1   Has anyone ever observed you stop breathing in your sleep? 0   Do you have or are you being treated for high blood pressure? 1   Recent BMI (Calculated) 35.7   Is BMI greater than 35 kg/m2? 1=Yes   Age older than 50 years old? 0=No   Is your neck circumference greater than 17 inches (Male) or 16 inches (Female)? 1   Gender - Male 1=Yes   STOP-BANG Total Score 6            Assessment and Plan:     Followed by thoracic surgery, anterior mediastinal mass    Flexible bronchoscopy/mediastinoscopy w/Dr Whitaker on 11/7/2023

## 2023-11-07 NOTE — ANESTHESIA POSTPROCEDURE EVALUATION
Patient: Edward Wolfe    Procedure Summary       Date: 11/07/23 Room / Location: [facility] / [facility]    Anesthesia Start: 1044 Anesthesia Stop:     Procedure: MEDIASTINOSCOPY Diagnosis:       Adenopathy      (Adenopathy [R59.9])    Surgeons: Anil Whitaker MD Responsible Provider: Arash Malik MD    Anesthesia Type: general ASA Status: 3            Anesthesia Type: general    Vitals Value Taken Time   /89 11/07/23 1211   Temp 36.1 °C (97 °F) 11/07/23 1210   Pulse 75 11/07/23 1211   Resp 16 11/07/23 1210   SpO2 100 % 11/07/23 1211   Vitals shown include unvalidated device data.    Anesthesia Post Evaluation    Patient location during evaluation: PACU  Patient participation: complete - patient participated  Level of consciousness: awake and alert  Pain score: 2  Pain management: adequate  Airway patency: patent  Cardiovascular status: acceptable, blood pressure returned to baseline and hemodynamically stable  Respiratory status: acceptable and face mask  Hydration status: acceptable        There were no known notable events for this encounter.

## 2023-11-07 NOTE — OP NOTE
MEDIASTINOSCOPY Operative Note  Patient: Edward Wolfe  : 1975  MRN: [id number]    Preoperative Diagnosis: Adenopathy [R59.9]  Postoperative Diagnosis: Adenopathy [R59.9]    Procedure: Procedure(s) (LRB):  MEDIASTINOSCOPY (N/A)    Surgeon: Surgeon(s):  Anil Whitaker MD    Other Staff: Surgeon(s) and Role:     Anesthesia Type: General  Anesthesia Staff: Anesthesiologist: Arash Malik MD  CRNA: LIZANDRO Martinez-DONNA  SRNA: LIZANDRO Gaviria-CRNA    Indications: Edward Wolfe is an 48 y.o. male who presents for Adenopathy [R59.9].  I attempted to biopsy this using CT-guided biopsy and endobronchial ultrasound without a conclusive diagnosis.  In this setting I recommended mediastinal noscapine for surgical biopsy    The patient was seen in the preoperative area. The risks, benefits, complications, treatment options, non-operative alternatives, expected recovery and outcomes were discussed with the patient. The possibilities of reaction to medication, pulmonary aspiration, injury to surrounding structures, bleeding, recurrent infection, the need for additional procedures, failure to diagnose a condition, and creating a complication requiring transfusion or operation were discussed with the patient. The patient concurred with the proposed plan, giving informed consent.  The site of surgery was properly noted/marked if necessary per policy.     Procedure Details:   The patient was placed on the operating table. A safety huddle was performed. General endotracheal anesthesia was initiated.      The patient was positioned in supine position. The patient was prepped using chlorhexidine.     I made a curvilinear incision in the sternal notch.  I developed this through the platysma to the strap muscles which were incised in the median raphae.  I continued this dissection through the soft tissue to the level of the pretracheal fascia which was incised.  I performed some blunt dissection with my finger.  I  inserted the mediastinal scope and advanced up to the level of the robert using blunt dissection.  I encountered an abnormal vascular structure here, which crossed through the subcarinal space leftward just above the left mainstem bronchus.  I broke scrub and evaluated the patient CT scan.  I believe this is an abnormal left superior pulmonary vein.    I turned my attention to lymph node biopsy.  I identified a large right paratracheal lymph node, which I believe was the same lymph node which had been biopsied by endobronchial ultrasound and consistent with an abnormality.  Certainly, the lymph node appeared abnormal, and therefore in my mind appropriate for biopsy purposes.  I performed several biopsies.  A portion was sent for flow cytometry.  An additional portion was sent for permanent evaluation.  I sent an additional specimen as well in case one of the specimens was mishandled.  I also biopsied an additional lymph node just proximal to this on the right side which also appeared abnormal and sent this for permanent analysis as well.  After this, I evaluated the patient for hemostasis and felt it was appropriate.  I remove the mediastinal scope.  Local was used for analgesia    The wounds were closed in layers.  The skin was dressed with Dermabond.  The procedure was completed and patient was brought to Recovery without evidence of immediate complications.         Estimated blood loss:  23  Complications: none  Disposition: Recovery  Condition: stable    Attending Attestation: I was present and scrubbed for the entire procedure.    Specimens:   ID Type Source Tests Collected by Time   1 : additonal right paratracial lymph node Tissue LYMPH NODE BIOPSY SURGICAL PATHOLOGY EXAM Anil Whitaker MD 11/7/2023 1126   2 : Right paratracheal Node Tissue LYMPH NODE EXCISION FLOW CYTOMETRY TEST Anil Whitaker MD 11/7/2023 1138   3 : right paratracial lymph node Tissue LYMPH NODE BIOPSY SURGICAL PATHOLOGY EXAM  Anil Whitaker MD 11/7/2023 1143   4 : right paratracial lymph node number 2 Tissue LYMPH NODE BIOPSY SURGICAL PATHOLOGY EXAM Anil Whitaker MD 11/7/2023 1150       Anil Whitaker  Phone Number: [phone]

## 2023-11-07 NOTE — PREPROCEDURE INSTRUCTIONS
Medication List            Accurate as of November 7, 2023  7:53 AM. Always use your most recent med list.                atorvastatin 40 mg tablet  Commonly known as: Lipitor  TAKE 1 TABLET BY MOUTH EVERY DAY AS DIRECTED.  Medication Adjustments for Surgery: Continue until night before surgery     gemfibrozil 600 mg tablet  Commonly known as: Lopid  TAKE 1 TABLET BY MOUTH EVERY DAY.  Medication Adjustments for Surgery: Continue until night before surgery     ibuprofen 200 mg tablet  Medication Adjustments for Surgery: Stop 7 days before surgery     lisinopril 10 mg tablet  TAKE 1 TABLET BY MOUTH EVERY DAY.  Medication Adjustments for Surgery: Take morning of surgery with sip of water, no other fluids     sertraline 100 mg tablet  Commonly known as: Zoloft  Take 1.5 tablets (150 mg) by mouth once daily.  Medication Adjustments for Surgery: Take morning of surgery with sip of water, no other fluids                      Pt is SAME DAY TESTING        NPO Instructions:    Do not eat any food after midnight the night before your surgery/procedure.    Additional Instructions:     Day of Surgery:  Wear  comfortable loose fitting clothing  Do not use moisturizers, creams, lotions or perfume

## 2023-11-07 NOTE — ANESTHESIA PROCEDURE NOTES
Airway  Date/Time: 11/7/2023 10:53 AM  Urgency: elective      Staffing  Performed: SRNA   Authorized by: Arash Malik MD    Performed by: IRINA Gaviria  Patient location during procedure: OR    Indications and Patient Condition  Indications for airway management: anesthesia and airway protection  Spontaneous ventilation: present  Sedation level: deep  Preoxygenated: yes  Patient position: sniffing  MILS maintained throughout  Mask difficulty assessment: 2 - vent by mask + OA or adjuvant +/- NMBA    Final Airway Details  Final airway type: endotracheal airway      Successful airway: ETT  Cuffed: yes   Successful intubation technique: video laryngoscopy  Facilitating devices/methods: cricoid pressure  Endotracheal tube insertion site: oral  Blade type: Milligan S4.  Blade size: #4  ETT size (mm): 7.5  Cormack-Lehane Classification: grade I - full view of glottis  Placement verified by: capnometry   Measured from: lips  ETT to lips (cm): 24  Number of attempts at approach: 1

## 2023-11-09 LAB
BLOOD EXPIRATION DATE: NORMAL
BLOOD EXPIRATION DATE: NORMAL
DISPENSE STATUS: NORMAL
DISPENSE STATUS: NORMAL
PRODUCT BLOOD TYPE: 7300
PRODUCT BLOOD TYPE: 7300
PRODUCT CODE: NORMAL
PRODUCT CODE: NORMAL
UNIT ABO: NORMAL
UNIT ABO: NORMAL
UNIT NUMBER: NORMAL
UNIT NUMBER: NORMAL
UNIT RH: NORMAL
UNIT RH: NORMAL
UNIT VOLUME: 350
UNIT VOLUME: 350
XM INTEP: NORMAL
XM INTEP: NORMAL

## 2023-11-14 DIAGNOSIS — R59.9 LYMPH NODE ENLARGEMENT: Primary | ICD-10-CM

## 2023-11-15 LAB
CELL COUNT (BLOOD): 108.94 X10*3/UL
CELL POPULATIONS: NORMAL
DIAGNOSIS: NORMAL
FLOW DIFFERENTIAL: NORMAL
FLOW TEST ORDERED: NORMAL
LAB AP ASR DISCLAIMER: NORMAL
LAB TEST METHOD: NORMAL
LABORATORY COMMENT REPORT: NORMAL
NUMBER OF CELLS COLLECTED: NORMAL PER TUBE
PATH REPORT.FINAL DX SPEC: NORMAL
PATH REPORT.GROSS SPEC: NORMAL
PATH REPORT.RELEVANT HX SPEC: NORMAL
PATH REPORT.TOTAL CANCER: NORMAL
PATH REPORT.TOTAL CANCER: NORMAL
SIGNATURE COMMENT: NORMAL
SPECIMEN VIABILITY: NORMAL

## 2023-11-16 ENCOUNTER — TELEPHONE (OUTPATIENT)
Dept: CARDIOTHORACIC SURGERY | Facility: HOSPITAL | Age: 48
End: 2023-11-16
Payer: COMMERCIAL

## 2023-11-16 NOTE — TELEPHONE ENCOUNTER
I called the patient to disclose the diagnosis: FINDINGS MOST CONSISTENT WITH CLASSIC HODGKIN LYMPHOMA. I recommended medical oncology evaluation and treatment.  The patient does not need surgery for this condition.  I advised him to follow-up in my office on an as-needed basis.  The patient agreed and understood.

## 2023-11-21 PROBLEM — M25.511 PAIN IN RIGHT SHOULDER: Status: ACTIVE | Noted: 2020-02-11

## 2023-11-21 PROBLEM — M17.11 PRIMARY OSTEOARTHRITIS OF RIGHT KNEE: Status: ACTIVE | Noted: 2022-11-28

## 2023-11-21 PROBLEM — M25.551 HIP PAIN, BILATERAL: Status: ACTIVE | Noted: 2023-11-21

## 2023-11-21 PROBLEM — I10 ESSENTIAL HYPERTENSION: Status: ACTIVE | Noted: 2020-11-30

## 2023-11-21 PROBLEM — M54.50 LEFT LOW BACK PAIN: Status: ACTIVE | Noted: 2023-11-21

## 2023-11-21 PROBLEM — M16.11 PRIMARY OSTEOARTHRITIS OF RIGHT HIP: Status: ACTIVE | Noted: 2020-07-13

## 2023-11-21 PROBLEM — R35.0 URINE FREQUENCY: Status: ACTIVE | Noted: 2023-11-21

## 2023-11-21 PROBLEM — Z99.89 DEPENDENCE ON CONTINUOUS POSITIVE AIRWAY PRESSURE VENTILATION: Status: ACTIVE | Noted: 2023-11-21

## 2023-11-21 PROBLEM — M25.552 HIP PAIN, BILATERAL: Status: ACTIVE | Noted: 2023-11-21

## 2023-11-21 PROBLEM — R93.89 ABNORMAL CHEST CT: Status: ACTIVE | Noted: 2023-11-21

## 2023-11-21 PROBLEM — F32.A ANXIETY AND DEPRESSION: Status: ACTIVE | Noted: 2023-11-21

## 2023-11-21 PROBLEM — F41.9 ANXIETY AND DEPRESSION: Status: ACTIVE | Noted: 2023-11-21

## 2023-11-21 PROBLEM — Z96.641 STATUS POST HIP REPLACEMENT, RIGHT: Status: ACTIVE | Noted: 2021-08-10

## 2023-11-21 PROBLEM — E78.1 HYPERTRIGLYCERIDEMIA: Status: ACTIVE | Noted: 2023-06-07

## 2023-11-21 PROBLEM — M19.90 ARTHRITIS: Status: ACTIVE | Noted: 2023-11-21

## 2023-11-21 PROBLEM — M75.121 NONTRAUMATIC COMPLETE TEAR OF RIGHT ROTATOR CUFF: Status: ACTIVE | Noted: 2020-01-28

## 2023-11-21 PROBLEM — M25.561 KNEE PAIN, RIGHT: Status: ACTIVE | Noted: 2023-11-21

## 2023-11-22 ENCOUNTER — OFFICE VISIT (OUTPATIENT)
Dept: HEMATOLOGY/ONCOLOGY | Facility: CLINIC | Age: 48
End: 2023-11-22
Payer: COMMERCIAL

## 2023-11-22 VITALS
RESPIRATION RATE: 20 BRPM | DIASTOLIC BLOOD PRESSURE: 89 MMHG | WEIGHT: 234.35 LBS | BODY MASS INDEX: 35.52 KG/M2 | HEIGHT: 68 IN | SYSTOLIC BLOOD PRESSURE: 137 MMHG | HEART RATE: 80 BPM | OXYGEN SATURATION: 97 % | TEMPERATURE: 98.6 F

## 2023-11-22 DIAGNOSIS — C81.78 OTHER CLASSICAL HODGKIN LYMPHOMA OF LYMPH NODES OF MULTIPLE REGIONS (MULTI): ICD-10-CM

## 2023-11-22 DIAGNOSIS — R59.9 LYMPH NODE ENLARGEMENT: ICD-10-CM

## 2023-11-22 DIAGNOSIS — C81.92 HODGKIN LYMPHOMA OF INTRATHORACIC LYMPH NODES, UNSPECIFIED HODGKIN LYMPHOMA TYPE (MULTI): Primary | ICD-10-CM

## 2023-11-22 PROBLEM — C81.98: Status: ACTIVE | Noted: 2023-11-22

## 2023-11-22 LAB
ATRIAL RATE: 73 BPM
P AXIS: 40 DEGREES
P OFFSET: 183 MS
P ONSET: 143 MS
PR INTERVAL: 152 MS
Q ONSET: 219 MS
QRS COUNT: 12 BEATS
QRS DURATION: 98 MS
QT INTERVAL: 388 MS
QTC CALCULATION(BAZETT): 427 MS
QTC FREDERICIA: 414 MS
R AXIS: 51 DEGREES
T AXIS: 25 DEGREES
T OFFSET: 413 MS
VENTRICULAR RATE: 73 BPM

## 2023-11-22 PROCEDURE — 3079F DIAST BP 80-89 MM HG: CPT | Performed by: INTERNAL MEDICINE

## 2023-11-22 PROCEDURE — 99205 OFFICE O/P NEW HI 60 MIN: CPT | Performed by: INTERNAL MEDICINE

## 2023-11-22 PROCEDURE — 1036F TOBACCO NON-USER: CPT | Performed by: INTERNAL MEDICINE

## 2023-11-22 PROCEDURE — 3075F SYST BP GE 130 - 139MM HG: CPT | Performed by: INTERNAL MEDICINE

## 2023-11-22 PROCEDURE — 99215 OFFICE O/P EST HI 40 MIN: CPT | Performed by: INTERNAL MEDICINE

## 2023-11-22 RX ORDER — OMEPRAZOLE 20 MG/1
20 CAPSULE, DELAYED RELEASE ORAL
COMMUNITY
End: 2024-06-10 | Stop reason: ALTCHOICE

## 2023-11-22 RX ORDER — MELOXICAM 7.5 MG/1
7.5 TABLET ORAL DAILY
COMMUNITY

## 2023-11-22 ASSESSMENT — ENCOUNTER SYMPTOMS
DEPRESSION: 0
OCCASIONAL FEELINGS OF UNSTEADINESS: 0
LOSS OF SENSATION IN FEET: 0

## 2023-11-22 ASSESSMENT — COLUMBIA-SUICIDE SEVERITY RATING SCALE - C-SSRS
6. HAVE YOU EVER DONE ANYTHING, STARTED TO DO ANYTHING, OR PREPARED TO DO ANYTHING TO END YOUR LIFE?: NO
2. HAVE YOU ACTUALLY HAD ANY THOUGHTS OF KILLING YOURSELF?: NO

## 2023-11-22 ASSESSMENT — PATIENT HEALTH QUESTIONNAIRE - PHQ9
SUM OF ALL RESPONSES TO PHQ9 QUESTIONS 1 AND 2: 0
2. FEELING DOWN, DEPRESSED OR HOPELESS: NOT AT ALL
1. LITTLE INTEREST OR PLEASURE IN DOING THINGS: NOT AT ALL

## 2023-11-22 ASSESSMENT — PAIN SCALES - GENERAL: PAINLEVEL: 0-NO PAIN

## 2023-11-22 NOTE — PROGRESS NOTES
Patient ID: Edward Wolfe is a 48 y.o. male.  Referring Physician: Anil Whitaker MD  [id number] [place] [address]  Primary Care Provider: Nellie Harrison DO  Cancer history:    Classical Hodgkin's lymphoma involving right paratracheal lymph node.  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  CT scan of chest revealed interval increase in the size of an irregular mass in the anterior mediastinum with associated calcification when compared to prior CT scan examination on August 11, 2021.  Prominent mediastinal lymphadenopathy is also noted which is increased compared to prior examination in 2021.  Findings are suspicious.  An EBUS by  on November 7, 2023 revealed anterior mediastinal paratracheal lymph node mass.  Biopsy consistent with classic Hodgkin's lymphoma.  A PET scan on September 18, 2023 revealedIMPRESSION:  1. Hypermetabolic anterior mediastinal mass which is concerning for  neoplasm. Further evaluation with soft tissue biopsy can be  considered.  2. Hypermetabolic mediastinal and right supraclavicular lymph nodes  which are concerning for metastatic lymphadenopathy.  3. No evidence of distal hypermetabolic lesions concerning for  metastases. the patient was referred for further evaluation and management.    At interview on November 22, 2023 the patient and his wife narrated entire history.  The patient is asymptomatic.  Denied a history of weight loss, fevers, night sweats, chest pain, shortness of breath, nausea, vomiting, hematemesis, melena, hematochezia and hematuria.    Past medical history:    Hypertension, hypercholesterolemia.    Past surgical history:    Right shoulder and right hip surgery.    Colonoscopy:    September 2023.    Family history:    Reviewed but not relevant.    Personal history and social  "history:    48 years old, , has 3 children.  Works as a teacher.  No history of smoking occasional alcohol no history of drug abuse.    Review of Systems   All other systems reviewed and are negative.       Objective   BSA: 2.25 meters squared  /89   Pulse 80   Temp 37 °C (98.6 °F) (Temporal)   Resp 20   Ht (S) 1.715 m (5' 7.52\")   Wt 106 kg (234 lb 5.6 oz)   SpO2 97%   BMI 36.14 kg/m²     No family history on file.  Oncology History    No history exists.       Edward Wolfe  reports that he has never smoked. He has never been exposed to tobacco smoke. He has never used smokeless tobacco.  He  reports current alcohol use.  He  reports no history of drug use.    Physical Exam  Constitutional:       Appearance: Normal appearance.   HENT:      Head: Normocephalic and atraumatic.      Nose: Nose normal.      Mouth/Throat:      Mouth: Mucous membranes are moist.      Pharynx: Oropharynx is clear.   Eyes:      Extraocular Movements: Extraocular movements intact.      Conjunctiva/sclera: Conjunctivae normal.      Pupils: Pupils are equal, round, and reactive to light.   Cardiovascular:      Rate and Rhythm: Normal rate and regular rhythm.   Pulmonary:      Effort: Pulmonary effort is normal.      Breath sounds: Normal breath sounds.   Abdominal:      General: Abdomen is flat. Bowel sounds are normal.      Palpations: Abdomen is soft.   Musculoskeletal:         General: Normal range of motion.      Cervical back: Normal range of motion and neck supple.   Neurological:      General: No focal deficit present.      Mental Status: He is alert and oriented to person, place, and time. Mental status is at baseline.   Psychiatric:         Mood and Affect: Mood normal.         Behavior: Behavior normal.         Thought Content: Thought content normal.         Judgment: Judgment normal.         Performance Status:  Asymptomatic    Assessment/Plan    The patient is a 48-year-old man with past medical history of " hypertension, hypercholesterolemia and a newly diagnosed classical Hodgkin's lymphoma involving mediastinum as well as possible right supraclavicular lymph node.  Physical examination was within normal limits.  There was no palpable cervical axillary inguinal lymphadenopathy.  I had a detailed discussion with the patient explained to him that it would be prudent to obtain more staging information such as bone marrow aspiration biopsy and also consider color Doppler echocardiogram, pulmonary function test as well as a port placement.  The patient and his wife understood appreciated all the details provided and were grateful.    Thank you for allowing me to participate in the care of your patient if you have any questions please feel free to call me      Diagnoses and all orders for this visit:  Hodgkin lymphoma of intrathoracic lymph nodes, unspecified Hodgkin lymphoma type (CMS/HCC)  -     Bone Marrow Evaluation  -     Onco-Echo Complete (Strain And 3D); Future  -     Pulmonary function testing; Future  Lymph node enlargement  -     Referral to Malignant Hematology (Hematology / Oncology)  -     Bone Marrow Evaluation  -     Onco-Echo Complete (Strain And 3D); Future  -     Pulmonary function testing; Future  Other classical Hodgkin lymphoma of lymph nodes of multiple regions (CMS/HCC)  -     Bone Marrow Evaluation  -     Onco-Echo Complete (Strain And 3D); Future  -     Pulmonary function testing; Future           Abelino Flores MD

## 2023-11-29 ENCOUNTER — APPOINTMENT (OUTPATIENT)
Dept: SURGERY | Facility: CLINIC | Age: 48
End: 2023-11-29
Payer: COMMERCIAL

## 2023-11-29 ENCOUNTER — HOSPITAL ENCOUNTER (OUTPATIENT)
Dept: RESPIRATORY THERAPY | Facility: HOSPITAL | Age: 48
Discharge: HOME | End: 2023-11-29
Payer: COMMERCIAL

## 2023-11-29 DIAGNOSIS — C81.78 OTHER CLASSICAL HODGKIN LYMPHOMA OF LYMPH NODES OF MULTIPLE REGIONS (MULTI): ICD-10-CM

## 2023-11-29 DIAGNOSIS — R59.9 LYMPH NODE ENLARGEMENT: ICD-10-CM

## 2023-11-29 DIAGNOSIS — C81.92 HODGKIN LYMPHOMA OF INTRATHORACIC LYMPH NODES, UNSPECIFIED HODGKIN LYMPHOMA TYPE (MULTI): ICD-10-CM

## 2023-11-29 LAB
MGC ASCENT PFT - FEV1 - PRE: 4.26
MGC ASCENT PFT - FEV1 - PREDICTED: 3.45
MGC ASCENT PFT - FVC - PRE: 5.69
MGC ASCENT PFT - FVC - PREDICTED: 4.29

## 2023-11-29 PROCEDURE — 94726 PLETHYSMOGRAPHY LUNG VOLUMES: CPT

## 2023-12-05 ENCOUNTER — HOSPITAL ENCOUNTER (OUTPATIENT)
Dept: CARDIOLOGY | Facility: CLINIC | Age: 48
Discharge: HOME | End: 2023-12-05
Payer: COMMERCIAL

## 2023-12-05 DIAGNOSIS — Z13.6 ENCOUNTER FOR SCREENING FOR CARDIOVASCULAR DISORDERS: ICD-10-CM

## 2023-12-05 DIAGNOSIS — C81.92 HODGKIN LYMPHOMA OF INTRATHORACIC LYMPH NODES, UNSPECIFIED HODGKIN LYMPHOMA TYPE (MULTI): ICD-10-CM

## 2023-12-05 DIAGNOSIS — R59.9 LYMPH NODE ENLARGEMENT: ICD-10-CM

## 2023-12-05 DIAGNOSIS — C81.78 OTHER CLASSICAL HODGKIN LYMPHOMA OF LYMPH NODES OF MULTIPLE REGIONS (MULTI): ICD-10-CM

## 2023-12-05 PROCEDURE — 93306 TTE W/DOPPLER COMPLETE: CPT | Performed by: INTERNAL MEDICINE

## 2023-12-05 PROCEDURE — 93356 MYOCRD STRAIN IMG SPCKL TRCK: CPT | Performed by: INTERNAL MEDICINE

## 2023-12-05 PROCEDURE — 93356 MYOCRD STRAIN IMG SPCKL TRCK: CPT

## 2023-12-05 PROCEDURE — 76376 3D RENDER W/INTRP POSTPROCES: CPT | Performed by: INTERNAL MEDICINE

## 2023-12-07 LAB
AORTIC VALVE MEAN GRADIENT: 2.9
AORTIC VALVE PEAK VELOCITY: 1.19
AV PEAK GRADIENT: 5.6
AVA (PEAK VEL): 2.88
AVA (VTI): 3
EJECTION FRACTION APICAL 4 CHAMBER: 65.1
EJECTION FRACTION: 64
LEFT VENTRICLE INTERNAL DIMENSION DIASTOLE: 5.42 (ref 3.5–6)
LEFT VENTRICULAR OUTFLOW TRACT DIAMETER: 2.12
MITRAL VALVE E/A RATIO: 1.03
RIGHT VENTRICLE FREE WALL PEAK S': 0.13
RIGHT VENTRICLE PEAK SYSTOLIC PRESSURE: 17
TRICUSPID ANNULAR PLANE SYSTOLIC EXCURSION: 2.6

## 2023-12-11 ENCOUNTER — TELEPHONE (OUTPATIENT)
Dept: HEMATOLOGY/ONCOLOGY | Facility: CLINIC | Age: 48
End: 2023-12-11
Payer: COMMERCIAL

## 2023-12-11 ENCOUNTER — HOSPITAL ENCOUNTER (OUTPATIENT)
Dept: RADIOLOGY | Facility: HOSPITAL | Age: 48
Discharge: HOME | End: 2023-12-11
Payer: COMMERCIAL

## 2023-12-11 ENCOUNTER — APPOINTMENT (OUTPATIENT)
Dept: HEMATOLOGY/ONCOLOGY | Facility: CLINIC | Age: 48
End: 2023-12-11
Payer: COMMERCIAL

## 2023-12-11 VITALS
SYSTOLIC BLOOD PRESSURE: 141 MMHG | DIASTOLIC BLOOD PRESSURE: 91 MMHG | BODY MASS INDEX: 34.1 KG/M2 | RESPIRATION RATE: 16 BRPM | WEIGHT: 225 LBS | HEART RATE: 75 BPM | TEMPERATURE: 97.7 F | OXYGEN SATURATION: 95 % | HEIGHT: 68 IN

## 2023-12-11 DIAGNOSIS — C81.78 OTHER CLASSICAL HODGKIN LYMPHOMA OF LYMPH NODES OF MULTIPLE REGIONS (MULTI): ICD-10-CM

## 2023-12-11 DIAGNOSIS — C81.92 HODGKIN LYMPHOMA OF INTRATHORACIC LYMPH NODES, UNSPECIFIED HODGKIN LYMPHOMA TYPE (MULTI): ICD-10-CM

## 2023-12-11 DIAGNOSIS — R59.9 LYMPH NODE ENLARGEMENT: ICD-10-CM

## 2023-12-11 LAB
ERYTHROCYTE [DISTWIDTH] IN BLOOD BY AUTOMATED COUNT: 11.9 % (ref 11.5–14.5)
HCT VFR BLD AUTO: 43.7 % (ref 41–52)
HGB BLD-MCNC: 14.9 G/DL (ref 13.5–17.5)
MCH RBC QN AUTO: 30.7 PG (ref 26–34)
MCHC RBC AUTO-ENTMCNC: 34.1 G/DL (ref 32–36)
MCV RBC AUTO: 90 FL (ref 80–100)
NRBC BLD-RTO: 0 /100 WBCS (ref 0–0)
PLATELET # BLD AUTO: 333 X10*3/UL (ref 150–450)
RBC # BLD AUTO: 4.86 X10*6/UL (ref 4.5–5.9)
WBC # BLD AUTO: 6.2 X10*3/UL (ref 4.4–11.3)

## 2023-12-11 PROCEDURE — 36415 COLL VENOUS BLD VENIPUNCTURE: CPT | Performed by: RADIOLOGY

## 2023-12-11 PROCEDURE — 2720000007 HC OR 272 NO HCPCS

## 2023-12-11 PROCEDURE — 2500000005 HC RX 250 GENERAL PHARMACY W/O HCPCS: Performed by: RADIOLOGY

## 2023-12-11 PROCEDURE — 38220 DX BONE MARROW ASPIRATIONS: CPT | Mod: RIGHT SIDE | Performed by: RADIOLOGY

## 2023-12-11 PROCEDURE — 99152 MOD SED SAME PHYS/QHP 5/>YRS: CPT

## 2023-12-11 PROCEDURE — 88237 TISSUE CULTURE BONE MARROW: CPT | Performed by: INTERNAL MEDICINE

## 2023-12-11 PROCEDURE — 99153 MOD SED SAME PHYS/QHP EA: CPT | Mod: RIGHT SIDE | Performed by: RADIOLOGY

## 2023-12-11 PROCEDURE — 85027 COMPLETE CBC AUTOMATED: CPT | Performed by: RADIOLOGY

## 2023-12-11 PROCEDURE — 99152 MOD SED SAME PHYS/QHP 5/>YRS: CPT | Performed by: RADIOLOGY

## 2023-12-11 PROCEDURE — 2500000004 HC RX 250 GENERAL PHARMACY W/ HCPCS (ALT 636 FOR OP/ED): Performed by: RADIOLOGY

## 2023-12-11 PROCEDURE — 77012 CT SCAN FOR NEEDLE BIOPSY: CPT | Mod: RIGHT SIDE | Performed by: RADIOLOGY

## 2023-12-11 PROCEDURE — 85097 BONE MARROW INTERPRETATION: CPT | Mod: TC,PARLAB | Performed by: INTERNAL MEDICINE

## 2023-12-11 RX ORDER — MIDAZOLAM HYDROCHLORIDE 1 MG/ML
INJECTION INTRAMUSCULAR; INTRAVENOUS AS NEEDED
Status: COMPLETED | OUTPATIENT
Start: 2023-12-11 | End: 2023-12-11

## 2023-12-11 RX ORDER — SODIUM CHLORIDE 9 MG/ML
100 INJECTION, SOLUTION INTRAVENOUS CONTINUOUS
Status: DISCONTINUED | OUTPATIENT
Start: 2023-12-11 | End: 2023-12-12 | Stop reason: HOSPADM

## 2023-12-11 RX ORDER — FENTANYL CITRATE 50 UG/ML
INJECTION, SOLUTION INTRAMUSCULAR; INTRAVENOUS AS NEEDED
Status: COMPLETED | OUTPATIENT
Start: 2023-12-11 | End: 2023-12-11

## 2023-12-11 RX ADMIN — MIDAZOLAM HYDROCHLORIDE 1 MG: 1 INJECTION, SOLUTION INTRAMUSCULAR; INTRAVENOUS at 09:22

## 2023-12-11 RX ADMIN — Medication 3 L/MIN: at 09:15

## 2023-12-11 RX ADMIN — FENTANYL CITRATE 50 MCG: 50 INJECTION, SOLUTION INTRAMUSCULAR; INTRAVENOUS at 09:22

## 2023-12-11 RX ADMIN — FENTANYL CITRATE 50 MCG: 50 INJECTION, SOLUTION INTRAMUSCULAR; INTRAVENOUS at 09:37

## 2023-12-11 RX ADMIN — SODIUM CHLORIDE 100 ML/HR: 9 INJECTION, SOLUTION INTRAVENOUS at 08:45

## 2023-12-11 RX ADMIN — MIDAZOLAM HYDROCHLORIDE 1 MG: 1 INJECTION, SOLUTION INTRAMUSCULAR; INTRAVENOUS at 09:37

## 2023-12-11 ASSESSMENT — PAIN SCALES - GENERAL
PAINLEVEL_OUTOF10: 0 - NO PAIN
PAINLEVEL_OUTOF10: 3
PAINLEVEL_OUTOF10: 0 - NO PAIN

## 2023-12-11 ASSESSMENT — PAIN - FUNCTIONAL ASSESSMENT: PAIN_FUNCTIONAL_ASSESSMENT: 0-10

## 2023-12-11 NOTE — NURSING NOTE
Patient educated regarding care after bone marrow biopsy and care after moderate conscious sedation as well as given written material. Patient discharged to home in stable condition.

## 2023-12-11 NOTE — TELEPHONE ENCOUNTER
Patient having bone marrow biopsy done today, per Dr Flores to wait for results before seeing patient. Patient was scheduled today for MD appt, appt changed to 12/26 at 1445, wife Meaghan agreeable. Plan is to go over biopsy results and do a biopsy on 12/26 with chemo start the week after. Meaghan agreeable and will call if any questions arise.

## 2023-12-11 NOTE — PROCEDURES
Interventional Radiology Brief Postprocedure Note    Attending: Blanca Haas MD    Assistant:   Staff Role   Faiza Espana, RN Radiology Nurse   Lamar Medina MT Radiology Technologist   Blanca Haas MD Radiologist       Diagnosis:   1. Lymph node enlargement  IR biopsy bone marrow    IR biopsy bone marrow      2. Hodgkin lymphoma of intrathoracic lymph nodes, unspecified Hodgkin lymphoma type (CMS/HCC)  IR biopsy bone marrow    IR biopsy bone marrow      3. Other classical Hodgkin lymphoma of lymph nodes of multiple regions (CMS/HCC)  IR biopsy bone marrow    IR biopsy bone marrow          Description of procedure: Bone Marrow Evaluation  IR biopsy bone marrow left iliac    Timeout:  Yes    Procedure Area: Procedure Area     Anesthesia:   Conscious Sedation    Complications: None    Estimated Blood Loss: none    Medications (Filter: Administrations occurring from 0902 to 0942 on 12/11/23) As of 12/11/23 0942      oxygen (O2) therapy (L/min) Total volume:  Not documented* Dosing weight:  102   *Total volume has not been documented. View each administration to see the amount administered.     Date/Time Rate/Dose/Volume Action       12/11/23  0915 3 L/min Start               fentaNYL PF (Sublimaze) injection (mcg) Total dose:  100 mcg      Date/Time Rate/Dose/Volume Action       12/11/23 0922 50 mcg Given      0937 50 mcg Given               midazolam (Versed) injection (mg) Total dose:  2 mg      Date/Time Rate/Dose/Volume Action       12/11/23 0922 1 mg Given      0937 1 mg Given                   No specimens collected      See detailed result report with images in PACS.    The patient tolerated the procedure well without incident or complication and is in stable condition.

## 2023-12-12 LAB
PATH REPORT.COMMENTS IMP SPEC: NORMAL
PATH REPORT.FINAL DX SPEC: NORMAL
PATH REPORT.GROSS SPEC: NORMAL
PATH REPORT.MICROSCOPIC SPEC OTHER STN: NORMAL
PATH REPORT.RELEVANT HX SPEC: NORMAL
PATH REPORT.TOTAL CANCER: NORMAL

## 2023-12-12 PROCEDURE — 88313 SPECIAL STAINS GROUP 2: CPT | Performed by: PATHOLOGY

## 2023-12-12 PROCEDURE — 88305 TISSUE EXAM BY PATHOLOGIST: CPT | Performed by: PATHOLOGY

## 2023-12-12 PROCEDURE — 88311 DECALCIFY TISSUE: CPT | Performed by: PATHOLOGY

## 2023-12-12 PROCEDURE — 88305 TISSUE EXAM BY PATHOLOGIST: CPT | Mod: TC,SUR,PARLAB | Performed by: INTERNAL MEDICINE

## 2023-12-13 ENCOUNTER — HOSPITAL ENCOUNTER (OUTPATIENT)
Dept: RADIOLOGY | Facility: HOSPITAL | Age: 48
Discharge: HOME | End: 2023-12-13
Payer: COMMERCIAL

## 2023-12-13 VITALS
WEIGHT: 224.87 LBS | OXYGEN SATURATION: 96 % | HEIGHT: 68 IN | TEMPERATURE: 98.6 F | BODY MASS INDEX: 34.08 KG/M2 | SYSTOLIC BLOOD PRESSURE: 158 MMHG | DIASTOLIC BLOOD PRESSURE: 98 MMHG | HEART RATE: 80 BPM | RESPIRATION RATE: 16 BRPM

## 2023-12-13 DIAGNOSIS — C81.78 OTHER CLASSICAL HODGKIN LYMPHOMA OF LYMPH NODES OF MULTIPLE REGIONS (MULTI): ICD-10-CM

## 2023-12-13 DIAGNOSIS — R59.9 LYMPH NODE ENLARGEMENT: ICD-10-CM

## 2023-12-13 DIAGNOSIS — C81.92 HODGKIN LYMPHOMA OF INTRATHORACIC LYMPH NODES, UNSPECIFIED HODGKIN LYMPHOMA TYPE (MULTI): ICD-10-CM

## 2023-12-13 LAB
CELL COUNT (BLOOD): 60.77 X10*3/UL
CELL POPULATIONS: NORMAL
DIAGNOSIS: NORMAL
FLOW DIFFERENTIAL: NORMAL
FLOW TEST ORDERED: NORMAL
LAB TEST METHOD: NORMAL
NUMBER OF CELLS COLLECTED: NORMAL PER TUBE
PATH REPORT.TOTAL CANCER: NORMAL
SIGNATURE COMMENT: NORMAL
SPECIMEN VIABILITY: NORMAL

## 2023-12-13 PROCEDURE — 77001 FLUOROGUIDE FOR VEIN DEVICE: CPT

## 2023-12-13 PROCEDURE — 88185 FLOWCYTOMETRY/TC ADD-ON: CPT | Mod: TC,PARLAB | Performed by: INTERNAL MEDICINE

## 2023-12-13 PROCEDURE — 2500000004 HC RX 250 GENERAL PHARMACY W/ HCPCS (ALT 636 FOR OP/ED): Performed by: RADIOLOGY

## 2023-12-13 PROCEDURE — 99153 MOD SED SAME PHYS/QHP EA: CPT

## 2023-12-13 PROCEDURE — 77001 FLUOROGUIDE FOR VEIN DEVICE: CPT | Performed by: RADIOLOGY

## 2023-12-13 PROCEDURE — 88189 FLOWCYTOMETRY/READ 16 & >: CPT | Performed by: PATHOLOGY

## 2023-12-13 PROCEDURE — 99152 MOD SED SAME PHYS/QHP 5/>YRS: CPT

## 2023-12-13 PROCEDURE — 76942 ECHO GUIDE FOR BIOPSY: CPT | Mod: 59

## 2023-12-13 PROCEDURE — 2780000003 HC OR 278 NO HCPCS

## 2023-12-13 PROCEDURE — 99153 MOD SED SAME PHYS/QHP EA: CPT | Performed by: RADIOLOGY

## 2023-12-13 PROCEDURE — 99152 MOD SED SAME PHYS/QHP 5/>YRS: CPT | Performed by: RADIOLOGY

## 2023-12-13 PROCEDURE — 2500000005 HC RX 250 GENERAL PHARMACY W/O HCPCS: Performed by: RADIOLOGY

## 2023-12-13 PROCEDURE — C1788 PORT, INDWELLING, IMP: HCPCS

## 2023-12-13 PROCEDURE — 36561 INSERT TUNNELED CV CATH: CPT | Performed by: RADIOLOGY

## 2023-12-13 RX ORDER — FENTANYL CITRATE 50 UG/ML
INJECTION, SOLUTION INTRAMUSCULAR; INTRAVENOUS
Status: COMPLETED | OUTPATIENT
Start: 2023-12-13 | End: 2023-12-13

## 2023-12-13 RX ORDER — SODIUM CHLORIDE 9 MG/ML
INJECTION, SOLUTION INTRAVENOUS CONTINUOUS PRN
Status: COMPLETED | OUTPATIENT
Start: 2023-12-13 | End: 2023-12-13

## 2023-12-13 RX ORDER — MIDAZOLAM HYDROCHLORIDE 1 MG/ML
INJECTION INTRAMUSCULAR; INTRAVENOUS
Status: COMPLETED | OUTPATIENT
Start: 2023-12-13 | End: 2023-12-13

## 2023-12-13 RX ADMIN — SODIUM CHLORIDE 50 ML/HR: 9 INJECTION, SOLUTION INTRAVENOUS at 13:51

## 2023-12-13 RX ADMIN — FENTANYL CITRATE 50 MCG: 50 INJECTION, SOLUTION INTRAMUSCULAR; INTRAVENOUS at 13:51

## 2023-12-13 RX ADMIN — MIDAZOLAM HYDROCHLORIDE 1 MG: 1 INJECTION, SOLUTION INTRAMUSCULAR; INTRAVENOUS at 13:57

## 2023-12-13 RX ADMIN — FENTANYL CITRATE 50 MCG: 50 INJECTION, SOLUTION INTRAMUSCULAR; INTRAVENOUS at 13:57

## 2023-12-13 RX ADMIN — MIDAZOLAM HYDROCHLORIDE 1 MG: 1 INJECTION, SOLUTION INTRAMUSCULAR; INTRAVENOUS at 13:51

## 2023-12-13 RX ADMIN — Medication 3 L/MIN: at 13:42

## 2023-12-13 ASSESSMENT — PAIN SCALES - GENERAL

## 2023-12-13 ASSESSMENT — PAIN - FUNCTIONAL ASSESSMENT: PAIN_FUNCTIONAL_ASSESSMENT: 0-10

## 2023-12-13 NOTE — PRE-PROCEDURE NOTE
Interventional Radiology Preprocedure Note    Indication for procedure: Diagnoses of Lymph node enlargement, Hodgkin lymphoma of intrathoracic lymph nodes, unspecified Hodgkin lymphoma type (CMS/HCC), and Other classical Hodgkin lymphoma of lymph nodes of multiple regions (CMS/HCC) were pertinent to this visit.    Relevant review of systems: NA    Relevant Labs:   Lab Results   Component Value Date    CREATININE 0.92 10/09/2023    EGFR >90 10/09/2023    INR 1.0 10/04/2023    PROTIME 11.2 10/04/2023       Planned Sedation/Anesthesia: Moderate    Airway assessment: normal    Directed physical examination:    Aox3  No increased work of breathing.   No acute distress      Mallampati: III (soft and hard palate and base of uvula visible)    ASA Score: ASA 2 - Patient with mild systemic disease with no functional limitations    Benefits, risks and alternatives of procedure and planned sedation have been discussed with the patient and/or their representative. All questions answered and they agree to proceed.

## 2023-12-13 NOTE — PROCEDURES
Interventional Radiology Brief Postprocedure Note    Attending: Blanca Haas MD    Assistant:   Staff Role   Lamar Medina MT Radiology Technologist   Kim Antonio, RN Radiology Nurse   Blanca Haas MD Radiologist       Diagnosis:   1. Lymph node enlargement  IR CVC port placement    IR CVC port placement      2. Hodgkin lymphoma of intrathoracic lymph nodes, unspecified Hodgkin lymphoma type (CMS/HCC)  IR CVC port placement    IR CVC port placement      3. Other classical Hodgkin lymphoma of lymph nodes of multiple regions (CMS/HCC)  IR CVC port placement    IR CVC port placement          Description of procedure: IR CVC port placement    Timeout:  Yes    Procedure Area: Procedure Area     Anesthesia:   Conscious Sedation    Complications: None    Estimated Blood Loss: none    Medications (Filter: Administrations occurring from 1341 to 1421 on 12/13/23) As of 12/13/23 1421      oxygen (O2) therapy (L/min) Total volume:  Not documented* Dosing weight:  102   *Total volume has not been documented. View each administration to see the amount administered.     Date/Time Rate/Dose/Volume Action       12/13/23  1342 3 L/min Start               fentaNYL PF (Sublimaze) injection (mcg) Total dose:  100 mcg      Date/Time Rate/Dose/Volume Action       12/13/23  1351 50 mcg Given      1357 50 mcg Given               midazolam (Versed) injection (mg) Total dose:  2 mg      Date/Time Rate/Dose/Volume Action       12/13/23  1351 1 mg Given      1357 1 mg Given               sodium chloride 0.9% infusion (mL/hr) Total volume:  Not documented*   *Total volume has not been documented. View each administration to see the amount administered.     Date/Time Rate/Dose/Volume Action       12/13/23  1351 50 mL/hr New Bag                   No specimens collected      See detailed result report with images in PACS.    The patient tolerated the procedure well without incident or complication and is in stable  condition.

## 2023-12-13 NOTE — DISCHARGE INSTRUCTIONS
Patient educated regarding care after port placement and moderate conscious sedation as well as given written instruction. Patient discharged to home in stable condition.

## 2023-12-18 ENCOUNTER — TUMOR BOARD CONFERENCE (OUTPATIENT)
Dept: HEMATOLOGY/ONCOLOGY | Facility: HOSPITAL | Age: 48
End: 2023-12-18
Payer: COMMERCIAL

## 2023-12-18 LAB
CHROM ANALY OVERALL INTERP-IMP: NORMAL
ELECTRONICALLY SIGNED BY CYTOGENETICS: NORMAL

## 2023-12-19 NOTE — TUMOR BOARD NOTE
Tumor Board Documentation    Edawrd Wolfe was presented by Abelino Flores MD at our Tumor Board on 12/18/2023, which included representatives from Medical oncology, Radiation oncology, Surgical oncology, Radiology, Pathology.    Edward currently presents as new patient with a right Paratracheal LN Biopsy with findings consistent with Classic Hodgkin Lymphoma.      Additionally, we reviewed previous medical and familial history, history of present illness, and recent lab results along with all available histopathologic and imaging studies. The tumor board considered available treatment options and made the following recommendations:  Recommend two cycles chemotherapy and then reassess.  May be a possible candidate for proton radiation therapy.       The following procedures/referrals were also placed: Radiation Oncology    Clinical Trial Status: Not discussed.    National site-specific guidelines were discussed with respect to the case.    Classic Hodgkin Lymphoma - Stage I vs Stage II

## 2023-12-26 ENCOUNTER — OFFICE VISIT (OUTPATIENT)
Dept: HEMATOLOGY/ONCOLOGY | Facility: CLINIC | Age: 48
End: 2023-12-26
Payer: COMMERCIAL

## 2023-12-26 VITALS
WEIGHT: 240.96 LBS | BODY MASS INDEX: 36.65 KG/M2 | RESPIRATION RATE: 20 BRPM | SYSTOLIC BLOOD PRESSURE: 145 MMHG | HEART RATE: 90 BPM | OXYGEN SATURATION: 95 % | DIASTOLIC BLOOD PRESSURE: 97 MMHG | TEMPERATURE: 98.4 F

## 2023-12-26 DIAGNOSIS — C81.72 OTHER CLASSICAL HODGKIN LYMPHOMA OF INTRATHORACIC LYMPH NODES (MULTI): Primary | ICD-10-CM

## 2023-12-26 PROCEDURE — 99215 OFFICE O/P EST HI 40 MIN: CPT | Performed by: INTERNAL MEDICINE

## 2023-12-26 PROCEDURE — 1036F TOBACCO NON-USER: CPT | Performed by: INTERNAL MEDICINE

## 2023-12-26 PROCEDURE — 3080F DIAST BP >= 90 MM HG: CPT | Performed by: INTERNAL MEDICINE

## 2023-12-26 PROCEDURE — 3077F SYST BP >= 140 MM HG: CPT | Performed by: INTERNAL MEDICINE

## 2023-12-26 RX ORDER — HEPARIN SODIUM,PORCINE/PF 10 UNIT/ML
50 SYRINGE (ML) INTRAVENOUS AS NEEDED
Status: CANCELLED | OUTPATIENT
Start: 2023-12-26

## 2023-12-26 RX ORDER — EPINEPHRINE 0.3 MG/.3ML
0.3 INJECTION SUBCUTANEOUS EVERY 5 MIN PRN
Status: CANCELLED | OUTPATIENT
Start: 2024-01-20

## 2023-12-26 RX ORDER — HEPARIN 100 UNIT/ML
500 SYRINGE INTRAVENOUS AS NEEDED
Status: CANCELLED | OUTPATIENT
Start: 2023-12-26

## 2023-12-26 RX ORDER — PALONOSETRON 0.05 MG/ML
0.25 INJECTION, SOLUTION INTRAVENOUS ONCE
Status: CANCELLED | OUTPATIENT
Start: 2024-01-20

## 2023-12-26 RX ORDER — PALONOSETRON 0.05 MG/ML
0.25 INJECTION, SOLUTION INTRAVENOUS ONCE
Status: CANCELLED | OUTPATIENT
Start: 2024-01-05

## 2023-12-26 RX ORDER — DIPHENHYDRAMINE HYDROCHLORIDE 50 MG/ML
50 INJECTION INTRAMUSCULAR; INTRAVENOUS AS NEEDED
Status: CANCELLED | OUTPATIENT
Start: 2024-01-20

## 2023-12-26 RX ORDER — BLEOMYCIN 30 [USP'U]/1
10 POWDER, FOR SOLUTION INTRAMUSCULAR; INTRAPLEURAL; INTRAVENOUS; SUBCUTANEOUS ONCE
Status: CANCELLED | OUTPATIENT
Start: 2024-01-05

## 2023-12-26 RX ORDER — FAMOTIDINE 10 MG/ML
20 INJECTION INTRAVENOUS ONCE AS NEEDED
Status: CANCELLED | OUTPATIENT
Start: 2024-01-20

## 2023-12-26 RX ORDER — PROCHLORPERAZINE MALEATE 5 MG
10 TABLET ORAL EVERY 6 HOURS PRN
Status: CANCELLED | OUTPATIENT
Start: 2024-01-05

## 2023-12-26 RX ORDER — ALBUTEROL SULFATE 0.83 MG/ML
3 SOLUTION RESPIRATORY (INHALATION) AS NEEDED
Status: CANCELLED | OUTPATIENT
Start: 2024-01-20

## 2023-12-26 RX ORDER — PROCHLORPERAZINE MALEATE 5 MG
10 TABLET ORAL EVERY 6 HOURS PRN
Status: CANCELLED | OUTPATIENT
Start: 2024-01-20

## 2023-12-26 RX ORDER — EPINEPHRINE 0.3 MG/.3ML
0.3 INJECTION SUBCUTANEOUS EVERY 5 MIN PRN
Status: CANCELLED | OUTPATIENT
Start: 2024-01-05

## 2023-12-26 RX ORDER — DOXORUBICIN HYDROCHLORIDE 2 MG/ML
25 INJECTION, SOLUTION INTRAVENOUS ONCE
Status: CANCELLED | OUTPATIENT
Start: 2024-01-05

## 2023-12-26 RX ORDER — BLEOMYCIN 30 [USP'U]/1
10 POWDER, FOR SOLUTION INTRAMUSCULAR; INTRAPLEURAL; INTRAVENOUS; SUBCUTANEOUS ONCE
Status: CANCELLED | OUTPATIENT
Start: 2024-01-20

## 2023-12-26 RX ORDER — ALBUTEROL SULFATE 0.83 MG/ML
3 SOLUTION RESPIRATORY (INHALATION) AS NEEDED
Status: CANCELLED | OUTPATIENT
Start: 2024-01-05

## 2023-12-26 RX ORDER — DOXORUBICIN HYDROCHLORIDE 2 MG/ML
25 INJECTION, SOLUTION INTRAVENOUS ONCE
Status: CANCELLED | OUTPATIENT
Start: 2024-01-20

## 2023-12-26 RX ORDER — DIPHENHYDRAMINE HYDROCHLORIDE 50 MG/ML
50 INJECTION INTRAMUSCULAR; INTRAVENOUS AS NEEDED
Status: CANCELLED | OUTPATIENT
Start: 2024-01-05

## 2023-12-26 RX ORDER — PROCHLORPERAZINE EDISYLATE 5 MG/ML
10 INJECTION INTRAMUSCULAR; INTRAVENOUS EVERY 6 HOURS PRN
Status: CANCELLED | OUTPATIENT
Start: 2024-01-05

## 2023-12-26 RX ORDER — PROCHLORPERAZINE EDISYLATE 5 MG/ML
10 INJECTION INTRAMUSCULAR; INTRAVENOUS EVERY 6 HOURS PRN
Status: CANCELLED | OUTPATIENT
Start: 2024-01-20

## 2023-12-26 RX ORDER — FAMOTIDINE 10 MG/ML
20 INJECTION INTRAVENOUS ONCE AS NEEDED
Status: CANCELLED | OUTPATIENT
Start: 2024-01-05

## 2023-12-26 ASSESSMENT — COLUMBIA-SUICIDE SEVERITY RATING SCALE - C-SSRS
1. IN THE PAST MONTH, HAVE YOU WISHED YOU WERE DEAD OR WISHED YOU COULD GO TO SLEEP AND NOT WAKE UP?: NO
2. HAVE YOU ACTUALLY HAD ANY THOUGHTS OF KILLING YOURSELF?: NO
6. HAVE YOU EVER DONE ANYTHING, STARTED TO DO ANYTHING, OR PREPARED TO DO ANYTHING TO END YOUR LIFE?: NO

## 2023-12-26 ASSESSMENT — ENCOUNTER SYMPTOMS
DEPRESSION: 0
OCCASIONAL FEELINGS OF UNSTEADINESS: 0
LOSS OF SENSATION IN FEET: 0

## 2023-12-26 ASSESSMENT — PATIENT HEALTH QUESTIONNAIRE - PHQ9
2. FEELING DOWN, DEPRESSED OR HOPELESS: NOT AT ALL
SUM OF ALL RESPONSES TO PHQ9 QUESTIONS 1 AND 2: 0
1. LITTLE INTEREST OR PLEASURE IN DOING THINGS: NOT AT ALL

## 2023-12-26 ASSESSMENT — PAIN SCALES - GENERAL: PAINLEVEL: 0-NO PAIN

## 2023-12-26 NOTE — PROGRESS NOTES
Patient ID: Edward Wolfe is a 48 y.o. male.  Referring Physician: No referring provider defined for this encounter.  Primary Care Provider: Nellie Harrison DO  Cancer history:    Classical Hodgkin's lymphoma involving right paratracheal lymph node.  PET scan positive in mediastinum as well as left supraglottic.  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  CT scan of chest revealed interval increase in the size of an irregular mass in the anterior mediastinum with associated calcification when compared to prior CT scan examination on August 11, 2021.  Prominent mediastinal lymphadenopathy is also noted which is increased compared to prior examination in 2021.  Findings are suspicious.  An EBUS by  on November 7, 2023 revealed anterior mediastinal paratracheal lymph node mass.  Biopsy consistent with classic Hodgkin's lymphoma.  A PET scan on September 18, 2023 revealedIMPRESSION:  1. Hypermetabolic anterior mediastinal mass which is concerning for  neoplasm. Further evaluation with soft tissue biopsy can be  considered.  2. Hypermetabolic mediastinal and right supraclavicular lymph nodes  which are concerning for metastatic lymphadenopathy.  3. No evidence of distal hypermetabolic lesions concerning for  metastases. the patient was referred for further evaluation and management.    At interview on November 22, 2023 the patient and his wife narrated entire history.  The patient is asymptomatic.  Denied a history of weight loss, fevers, night sweats, chest pain, shortness of breath, nausea, vomiting, hematemesis, melena, hematochezia and hematuria.    The patient and his wife had come for follow-up on December 26, 2023 regarding history of stage IIa classical Hodgkin's lymphoma involving left supraclavicular and right  mediastinal lymph nodes.  The patient is asymptomatic.  [id number] was placed, color Doppler echocardiogram and pulmonary function tests were done as well.  The patient is asymptomatic.    Past medical history:    Hypertension, hypercholesterolemia.    Past surgical history:    Right shoulder and right hip surgery.    Colonoscopy:    September 2023.    Family history:    Reviewed but not relevant.    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    Review of Systems   All other systems reviewed and are negative.       Objective   BSA: 2.29 meters squared  BP (!) 145/97   Pulse 90   Temp 36.9 °C (98.4 °F) (Temporal)   Resp 20   Wt 109 kg (240 lb 15.4 oz)   SpO2 95%   BMI 36.65 kg/m²     No family history on file.  Oncology History   Hodgkin lymphoma of intrathoracic lymph nodes (CMS/HCC)   11/22/2023 Initial Diagnosis    Hodgkin lymphoma of intrathoracic lymph nodes (CMS/HCC)     1/13/2024 -  Chemotherapy    ABVD (DOXOrubicin / Bleomycin / VinBLAStine / Dacarbazine), 28 Day Cycles         Edward Wolfe  reports that he has never smoked. He has never been exposed to tobacco smoke. He has never used smokeless tobacco.  He  reports current alcohol use.  He  reports no history of drug use.    Physical Exam  Constitutional:       Appearance: Normal appearance.   HENT:      Head: Normocephalic and atraumatic.      Nose: Nose normal.      Mouth/Throat:      Mouth: Mucous membranes are moist.      Pharynx: Oropharynx is clear.   Eyes:      Extraocular Movements: Extraocular movements intact.      Conjunctiva/sclera: Conjunctivae normal.      Pupils: Pupils are equal, round, and reactive to light.   Cardiovascular:      Rate and Rhythm: Normal rate and regular rhythm.   Pulmonary:      Effort: Pulmonary effort is normal.      Breath sounds: Normal breath sounds.   Abdominal:      General: Abdomen  is flat. Bowel sounds are normal.      Palpations: Abdomen is soft.   Musculoskeletal:         General: Normal range of motion.      Cervical back: Normal range of motion and neck supple.   Neurological:      General: No focal deficit present.      Mental Status: He is alert and oriented to person, place, and time. Mental status is at baseline.   Psychiatric:         Mood and Affect: Mood normal.         Behavior: Behavior normal.         Thought Content: Thought content normal.         Judgment: Judgment normal.         Performance Status:  Asymptomatic    Assessment/Plan    The patient is a 48-year-old man with past medical history of hypertension, hypercholesterolemia and a newly diagnosed classical Hodgkin's lymphoma involving mediastinum as well as possible right supraclavicular lymph node.  Physical examination was within normal limits.  There was no palpable cervical axillary inguinal lymphadenopathy.  I had a detailed discussion with the patient explained to him that it would be prudent to obtain more staging information such as bone marrow aspiration biopsy and also consider color Doppler echocardiogram, pulmonary function test as well as a port placement.  The patient and his wife understood appreciated all the details provided and were grateful.    Patient and his wife had come for follow-up on December 26, 2023 regarding history of classical Hodgkin's lymphoma involving left supra clavicular and mediastinal/hilar lymph nodes, stage IIa.  Color Doppler echocardiogram revealed ejection fraction 65% pulmonary function tests in reference range, [id number] was placed.  And a detailed discussion with the patient explained to him that if agreeable would recommend chemotherapy using Adriamycin, bleomycin, vinblastine, dacarbazine every 2 weeks x 4 cycles followed by PET scan and then radiation oncology evaluation.  Other option to do nothing.  After thinking through all the options the patient is agreeable to  start chemotherapy.  Effect side effects explained.  Printed material from [facility] provided.  Informed consent obtained.  The patient to start chemotherapy first week of January 2024.    Thank you for allowing me to participate in the care of your patient if you have any questions please feel free to call me      Diagnoses and all orders for this visit:  Hodgkin lymphoma of intrathoracic lymph nodes, unspecified Hodgkin lymphoma type (CMS/HCC)  -     Bone Marrow Evaluation  -     Onco-Echo Complete (Strain And 3D); Future  -     Pulmonary function testing; Future  Lymph node enlargement  -     Referral to Malignant Hematology (Hematology / Oncology)  -     Bone Marrow Evaluation  -     Onco-Echo Complete (Strain And 3D); Future  -     Pulmonary function testing; Future  Other classical Hodgkin lymphoma of lymph nodes of multiple regions (CMS/HCC)  -     Bone Marrow Evaluation  -     Onco-Echo Complete (Strain And 3D); Future  -     Pulmonary function testing; Future           Abelino Flores MD

## 2024-01-03 DIAGNOSIS — C81.72 OTHER CLASSICAL HODGKIN LYMPHOMA OF INTRATHORACIC LYMPH NODES (MULTI): Primary | ICD-10-CM

## 2024-01-03 RX ORDER — PROCHLORPERAZINE EDISYLATE 5 MG/ML
10 INJECTION INTRAMUSCULAR; INTRAVENOUS EVERY 6 HOURS PRN
Status: CANCELLED | OUTPATIENT
Start: 2024-02-03

## 2024-01-03 RX ORDER — ALBUTEROL SULFATE 0.83 MG/ML
3 SOLUTION RESPIRATORY (INHALATION) AS NEEDED
Status: CANCELLED | OUTPATIENT
Start: 2024-02-03

## 2024-01-03 RX ORDER — DOXORUBICIN HYDROCHLORIDE 2 MG/ML
25 INJECTION, SOLUTION INTRAVENOUS ONCE
Status: CANCELLED | OUTPATIENT
Start: 2024-02-17

## 2024-01-03 RX ORDER — FAMOTIDINE 10 MG/ML
20 INJECTION INTRAVENOUS ONCE AS NEEDED
Status: CANCELLED | OUTPATIENT
Start: 2024-02-03

## 2024-01-03 RX ORDER — DIPHENHYDRAMINE HYDROCHLORIDE 50 MG/ML
50 INJECTION INTRAMUSCULAR; INTRAVENOUS AS NEEDED
Status: CANCELLED | OUTPATIENT
Start: 2024-02-17

## 2024-01-03 RX ORDER — PROCHLORPERAZINE EDISYLATE 5 MG/ML
10 INJECTION INTRAMUSCULAR; INTRAVENOUS EVERY 6 HOURS PRN
Status: CANCELLED | OUTPATIENT
Start: 2024-02-17

## 2024-01-03 RX ORDER — BLEOMYCIN 30 [USP'U]/1
10 POWDER, FOR SOLUTION INTRAMUSCULAR; INTRAPLEURAL; INTRAVENOUS; SUBCUTANEOUS ONCE
Status: CANCELLED | OUTPATIENT
Start: 2024-02-03

## 2024-01-03 RX ORDER — DOXORUBICIN HYDROCHLORIDE 2 MG/ML
25 INJECTION, SOLUTION INTRAVENOUS ONCE
Status: CANCELLED | OUTPATIENT
Start: 2024-02-03

## 2024-01-03 RX ORDER — EPINEPHRINE 0.3 MG/.3ML
0.3 INJECTION SUBCUTANEOUS EVERY 5 MIN PRN
Status: CANCELLED | OUTPATIENT
Start: 2024-02-17

## 2024-01-03 RX ORDER — BLEOMYCIN 30 [USP'U]/1
10 POWDER, FOR SOLUTION INTRAMUSCULAR; INTRAPLEURAL; INTRAVENOUS; SUBCUTANEOUS ONCE
Status: CANCELLED | OUTPATIENT
Start: 2024-02-17

## 2024-01-03 RX ORDER — PROCHLORPERAZINE MALEATE 5 MG
10 TABLET ORAL EVERY 6 HOURS PRN
Status: CANCELLED | OUTPATIENT
Start: 2024-02-03

## 2024-01-03 RX ORDER — EPINEPHRINE 0.3 MG/.3ML
0.3 INJECTION SUBCUTANEOUS EVERY 5 MIN PRN
Status: CANCELLED | OUTPATIENT
Start: 2024-02-03

## 2024-01-03 RX ORDER — PALONOSETRON 0.05 MG/ML
0.25 INJECTION, SOLUTION INTRAVENOUS ONCE
Status: CANCELLED | OUTPATIENT
Start: 2024-02-17

## 2024-01-03 RX ORDER — PALONOSETRON 0.05 MG/ML
0.25 INJECTION, SOLUTION INTRAVENOUS ONCE
Status: CANCELLED | OUTPATIENT
Start: 2024-02-03

## 2024-01-03 RX ORDER — FAMOTIDINE 10 MG/ML
20 INJECTION INTRAVENOUS ONCE AS NEEDED
Status: CANCELLED | OUTPATIENT
Start: 2024-02-17

## 2024-01-03 RX ORDER — PROCHLORPERAZINE MALEATE 5 MG
10 TABLET ORAL EVERY 6 HOURS PRN
Status: CANCELLED | OUTPATIENT
Start: 2024-02-17

## 2024-01-03 RX ORDER — DIPHENHYDRAMINE HYDROCHLORIDE 50 MG/ML
50 INJECTION INTRAMUSCULAR; INTRAVENOUS AS NEEDED
Status: CANCELLED | OUTPATIENT
Start: 2024-02-03

## 2024-01-03 RX ORDER — ALBUTEROL SULFATE 0.83 MG/ML
3 SOLUTION RESPIRATORY (INHALATION) AS NEEDED
Status: CANCELLED | OUTPATIENT
Start: 2024-02-17

## 2024-01-04 ENCOUNTER — INFUSION (OUTPATIENT)
Dept: HEMATOLOGY/ONCOLOGY | Facility: CLINIC | Age: 49
End: 2024-01-04
Payer: COMMERCIAL

## 2024-01-04 DIAGNOSIS — C81.72 OTHER CLASSICAL HODGKIN LYMPHOMA OF INTRATHORACIC LYMPH NODES (MULTI): Primary | ICD-10-CM

## 2024-01-04 DIAGNOSIS — C81.72 OTHER CLASSICAL HODGKIN LYMPHOMA OF INTRATHORACIC LYMPH NODES (MULTI): ICD-10-CM

## 2024-01-04 LAB
ALBUMIN SERPL BCP-MCNC: 4.9 G/DL (ref 3.4–5)
ALP SERPL-CCNC: 87 U/L (ref 33–120)
ALT SERPL W P-5'-P-CCNC: 65 U/L (ref 10–52)
ANION GAP SERPL CALC-SCNC: 13 MMOL/L (ref 10–20)
AST SERPL W P-5'-P-CCNC: 42 U/L (ref 9–39)
BASOPHILS # BLD AUTO: 0.02 X10*3/UL (ref 0–0.1)
BASOPHILS NFR BLD AUTO: 0.3 %
BILIRUB SERPL-MCNC: 0.5 MG/DL (ref 0–1.2)
BUN SERPL-MCNC: 19 MG/DL (ref 6–23)
CALCIUM SERPL-MCNC: 9.2 MG/DL (ref 8.6–10.3)
CHLORIDE SERPL-SCNC: 104 MMOL/L (ref 98–107)
CO2 SERPL-SCNC: 23 MMOL/L (ref 21–32)
CREAT SERPL-MCNC: 0.86 MG/DL (ref 0.5–1.3)
EOSINOPHIL # BLD AUTO: 0.19 X10*3/UL (ref 0–0.7)
EOSINOPHIL NFR BLD AUTO: 2.9 %
ERYTHROCYTE [DISTWIDTH] IN BLOOD BY AUTOMATED COUNT: 12.1 % (ref 11.5–14.5)
GFR SERPL CREATININE-BSD FRML MDRD: >90 ML/MIN/1.73M*2
GLUCOSE SERPL-MCNC: 116 MG/DL (ref 74–99)
HCT VFR BLD AUTO: 41 % (ref 41–52)
HGB BLD-MCNC: 14.1 G/DL (ref 13.5–17.5)
IMM GRANULOCYTES # BLD AUTO: 0.02 X10*3/UL (ref 0–0.7)
IMM GRANULOCYTES NFR BLD AUTO: 0.3 % (ref 0–0.9)
LDH SERPL L TO P-CCNC: 193 U/L (ref 84–246)
LYMPHOCYTES # BLD AUTO: 1.45 X10*3/UL (ref 1.2–4.8)
LYMPHOCYTES NFR BLD AUTO: 22.2 %
MCH RBC QN AUTO: 30.6 PG (ref 26–34)
MCHC RBC AUTO-ENTMCNC: 34.4 G/DL (ref 32–36)
MCV RBC AUTO: 89 FL (ref 80–100)
MONOCYTES # BLD AUTO: 0.62 X10*3/UL (ref 0.1–1)
MONOCYTES NFR BLD AUTO: 9.5 %
NEUTROPHILS # BLD AUTO: 4.24 X10*3/UL (ref 1.2–7.7)
NEUTROPHILS NFR BLD AUTO: 64.8 %
NRBC BLD-RTO: 0 /100 WBCS (ref 0–0)
PLATELET # BLD AUTO: 351 X10*3/UL (ref 150–450)
POTASSIUM SERPL-SCNC: 3.8 MMOL/L (ref 3.5–5.3)
PROT SERPL-MCNC: 7.5 G/DL (ref 6.4–8.2)
RBC # BLD AUTO: 4.61 X10*6/UL (ref 4.5–5.9)
SODIUM SERPL-SCNC: 136 MMOL/L (ref 136–145)
URATE SERPL-MCNC: 5.2 MG/DL (ref 4–7.5)
WBC # BLD AUTO: 6.5 X10*3/UL (ref 4.4–11.3)

## 2024-01-04 PROCEDURE — 85025 COMPLETE CBC W/AUTO DIFF WBC: CPT

## 2024-01-04 PROCEDURE — 84550 ASSAY OF BLOOD/URIC ACID: CPT

## 2024-01-04 PROCEDURE — 80053 COMPREHEN METABOLIC PANEL: CPT

## 2024-01-04 PROCEDURE — 36591 DRAW BLOOD OFF VENOUS DEVICE: CPT

## 2024-01-04 PROCEDURE — 96523 IRRIG DRUG DELIVERY DEVICE: CPT

## 2024-01-04 PROCEDURE — 2500000004 HC RX 250 GENERAL PHARMACY W/ HCPCS (ALT 636 FOR OP/ED): Performed by: INTERNAL MEDICINE

## 2024-01-04 PROCEDURE — 83615 LACTATE (LD) (LDH) ENZYME: CPT

## 2024-01-04 RX ORDER — HEPARIN 100 UNIT/ML
500 SYRINGE INTRAVENOUS AS NEEDED
Status: CANCELLED | OUTPATIENT
Start: 2024-01-04

## 2024-01-04 RX ORDER — PROCHLORPERAZINE MALEATE 10 MG
10 TABLET ORAL EVERY 6 HOURS PRN
Qty: 60 TABLET | Refills: 5 | Status: SHIPPED | OUTPATIENT
Start: 2024-01-04 | End: 2024-01-11

## 2024-01-04 RX ORDER — HEPARIN 100 UNIT/ML
500 SYRINGE INTRAVENOUS AS NEEDED
Status: DISCONTINUED | OUTPATIENT
Start: 2024-01-04 | End: 2024-01-04 | Stop reason: HOSPADM

## 2024-01-04 RX ORDER — HEPARIN SODIUM,PORCINE/PF 10 UNIT/ML
50 SYRINGE (ML) INTRAVENOUS AS NEEDED
Status: CANCELLED | OUTPATIENT
Start: 2024-01-04

## 2024-01-04 RX ADMIN — HEPARIN 500 UNITS: 100 SYRINGE at 14:58

## 2024-01-05 ENCOUNTER — SOCIAL WORK (OUTPATIENT)
Dept: HEMATOLOGY/ONCOLOGY | Facility: CLINIC | Age: 49
End: 2024-01-05
Payer: COMMERCIAL

## 2024-01-05 ENCOUNTER — INFUSION (OUTPATIENT)
Dept: HEMATOLOGY/ONCOLOGY | Facility: CLINIC | Age: 49
End: 2024-01-05
Payer: COMMERCIAL

## 2024-01-05 VITALS
DIASTOLIC BLOOD PRESSURE: 78 MMHG | TEMPERATURE: 97.5 F | RESPIRATION RATE: 18 BRPM | OXYGEN SATURATION: 92 % | SYSTOLIC BLOOD PRESSURE: 140 MMHG | WEIGHT: 243.61 LBS | HEART RATE: 75 BPM | BODY MASS INDEX: 37.05 KG/M2

## 2024-01-05 DIAGNOSIS — C81.72 OTHER CLASSICAL HODGKIN LYMPHOMA OF INTRATHORACIC LYMPH NODES (MULTI): ICD-10-CM

## 2024-01-05 PROCEDURE — 96375 TX/PRO/DX INJ NEW DRUG ADDON: CPT | Mod: INF

## 2024-01-05 PROCEDURE — 2500000004 HC RX 250 GENERAL PHARMACY W/ HCPCS (ALT 636 FOR OP/ED): Performed by: INTERNAL MEDICINE

## 2024-01-05 PROCEDURE — 96413 CHEMO IV INFUSION 1 HR: CPT

## 2024-01-05 PROCEDURE — 96367 TX/PROPH/DG ADDL SEQ IV INF: CPT

## 2024-01-05 PROCEDURE — 96411 CHEMO IV PUSH ADDL DRUG: CPT

## 2024-01-05 RX ORDER — BLEOMYCIN 30 [USP'U]/1
10 POWDER, FOR SOLUTION INTRAMUSCULAR; INTRAPLEURAL; INTRAVENOUS; SUBCUTANEOUS ONCE
Status: COMPLETED | OUTPATIENT
Start: 2024-01-05 | End: 2024-01-05

## 2024-01-05 RX ORDER — HEPARIN 100 UNIT/ML
500 SYRINGE INTRAVENOUS AS NEEDED
Status: DISCONTINUED | OUTPATIENT
Start: 2024-01-05 | End: 2024-01-05 | Stop reason: HOSPADM

## 2024-01-05 RX ORDER — FAMOTIDINE 10 MG/ML
20 INJECTION INTRAVENOUS ONCE AS NEEDED
Status: DISCONTINUED | OUTPATIENT
Start: 2024-01-05 | End: 2024-01-05 | Stop reason: HOSPADM

## 2024-01-05 RX ORDER — HEPARIN SODIUM,PORCINE/PF 10 UNIT/ML
50 SYRINGE (ML) INTRAVENOUS AS NEEDED
Status: CANCELLED | OUTPATIENT
Start: 2024-01-05

## 2024-01-05 RX ORDER — DOXORUBICIN HYDROCHLORIDE 2 MG/ML
25 INJECTION, SOLUTION INTRAVENOUS ONCE
Status: COMPLETED | OUTPATIENT
Start: 2024-01-05 | End: 2024-01-05

## 2024-01-05 RX ORDER — ALBUTEROL SULFATE 0.83 MG/ML
3 SOLUTION RESPIRATORY (INHALATION) AS NEEDED
Status: DISCONTINUED | OUTPATIENT
Start: 2024-01-05 | End: 2024-01-05 | Stop reason: HOSPADM

## 2024-01-05 RX ORDER — DIPHENHYDRAMINE HYDROCHLORIDE 50 MG/ML
50 INJECTION INTRAMUSCULAR; INTRAVENOUS AS NEEDED
Status: DISCONTINUED | OUTPATIENT
Start: 2024-01-05 | End: 2024-01-05 | Stop reason: HOSPADM

## 2024-01-05 RX ORDER — EPINEPHRINE 0.3 MG/.3ML
0.3 INJECTION SUBCUTANEOUS EVERY 5 MIN PRN
Status: DISCONTINUED | OUTPATIENT
Start: 2024-01-05 | End: 2024-01-05 | Stop reason: HOSPADM

## 2024-01-05 RX ORDER — HEPARIN 100 UNIT/ML
500 SYRINGE INTRAVENOUS AS NEEDED
Status: CANCELLED | OUTPATIENT
Start: 2024-01-05

## 2024-01-05 RX ORDER — PALONOSETRON 0.05 MG/ML
0.25 INJECTION, SOLUTION INTRAVENOUS ONCE
Status: COMPLETED | OUTPATIENT
Start: 2024-01-05 | End: 2024-01-05

## 2024-01-05 RX ADMIN — FOSAPREPITANT 150 MG: 150 INJECTION, POWDER, LYOPHILIZED, FOR SOLUTION INTRAVENOUS at 10:46

## 2024-01-05 RX ADMIN — DEXAMETHASONE SODIUM PHOSPHATE 12 MG: 10 INJECTION, SOLUTION INTRAMUSCULAR; INTRAVENOUS at 10:27

## 2024-01-05 RX ADMIN — DACARBAZINE 900 MG: 10 INJECTION, POWDER, FOR SOLUTION INTRAVENOUS at 12:26

## 2024-01-05 RX ADMIN — PALONOSETRON 250 MCG: 0.05 INJECTION, SOLUTION INTRAVENOUS at 10:25

## 2024-01-05 RX ADMIN — BLEOMYCIN SULFATE 22.9 UNITS: 30 POWDER, FOR SOLUTION INTRAMUSCULAR; INTRAPLEURAL; INTRAVENOUS; SUBCUTANEOUS at 11:59

## 2024-01-05 RX ADMIN — HEPARIN 500 UNITS: 100 SYRINGE at 13:07

## 2024-01-05 RX ADMIN — DOXORUBICIN HYDROCHLORIDE 58 MG: 2 INJECTION, SOLUTION INTRAVENOUS at 11:28

## 2024-01-05 RX ADMIN — VINBLASTINE SULFATE 13.5 MG: 1 INJECTION INTRAVENOUS at 12:13

## 2024-01-05 ASSESSMENT — PAIN SCALES - GENERAL: PAINLEVEL: 0-NO PAIN

## 2024-01-05 NOTE — SIGNIFICANT EVENT
01/05/24 1000   Prechemo Checklist   Has the patient been in the hospital, ED, or urgent care since last date of service No   Chemo/Immuno Consent Signed Yes  (12/26/23)   Protocol/Indications Verified Yes   Confirmed to previous date/time of medication N/A   Compared to previous dose N/A   Pregnancy Test Negative Not applicable   Parameters Met Yes   BSA/Weight-Height Verified Yes   Dose Calculations Verified Yes

## 2024-01-05 NOTE — SIGNIFICANT EVENT
Port assessed, accessed and blood return noted, flushed with normal saline and left saline locked for therapy.

## 2024-01-05 NOTE — PROGRESS NOTES
ONCOLOGY/HEMATOLOGY PSYCHOSOCIAL ASSESSMENT     Demographic Information  Edward Wolfe  1975  [id number]  Assessment Type:    Date of assessment: 01/05/24  Person(s) present during assessment: pt and his wife  Did patient participate in assessment: yes  If no, why not:  Primary language: English  Interpretive services used: No  Medical Oncologist: Mark  Diagnosis: Hodgkins lymphoma                Marital Status / Family / Household  Status:    Family composition:  and three children one dtr in college and a 15 year old dtr at home and a 12 y/o son  Family health history: no hx of any lymphoma in the family   Support systems: wife and family as well as parents and inlaws  Primary caregiver:  self  Current employment status:  employed  Work history/type of work:    for 25+ years  Comments:     Environmental Supports  Current living situation:   house  Patient's home environment: has no issues navigating in his home     Functional Status   Functional status:  Independent   Other health issues that the patient is experiencing: none at this time  What supports are in place to assist the patient: pt has supports in place  What community resources have been offered: [facility] and wellbeing retreat  Transportation:  pt independent with driving and wife assists  Psychosocial risk factors impacting the patient: adjustment to diagnosis and treatment        Assistive Devices  Patient has the following equipment: none needed  Patient currently ambulates:  independently     Finances/Insurance  Primary insurance: medical mutual  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  Does the patient have any financial concerns:  none at this time    Comments:      Advance Directives  Nothing on file  Legal decision maker: patient      Mental Health  Mental health history and status details:  none on file    How does the patient describe their current health  status: Pt is clear on his dx and treatment plan   What does the patient do for enjoyment: 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    Depression Screen        Social Determinants of Health     Tobacco Use: Low Risk  (12/26/2023)    Patient History     Smoking Tobacco Use: Never     Smokeless Tobacco Use: Never     Passive Exposure: Never   Alcohol Use: Not on file   Financial Resource Strain: Not on file   Food Insecurity: Not on file   Transportation Needs: Not on file   Physical Activity: Not on file   Stress: Not on file   Social Connections: Not on file   Intimate Partner Violence: Not on file   Depression: Not at risk (1/5/2024)    PHQ-2     PHQ-2 Score: 0   Housing Stability: Not on file   Utilities: Not on file   Digital Equity: Not on file       Assessment/Plan  Pt is a 49 y/o male dx with hodgkins lymphoma on an incidental finding. Pt starting ABVD today and indicates he may be getting xrt as well. Per pt he will be getting 4 scheduled treatments and then have a PET scan to assess response. Pt wants to continue his job and normal routine if possible during this. Pt is independent with function and self care. Pt and wife explained the ordeal they went through from July through Nov until they finally learned pt's dx was treatable. Pt feeling much better after hearing his dx is treatable and they he should do well. Pt expressed his relief and now feels he wants to start to get going and get better. Sat down with them and introduced Onc LISW-S supportive role. Reviewed resources with them. Answered questions about past fears and even current one that were on their mind. At this point they are telling their three kids that pt is getting treatment for his enlarged lymph nodes. They did not want their kids worrying. After talking about all the changes in treatment discussed changing the fear and the narrative for their kids after pt is successful. Pt and wife agreed. Left card with them and let them know pt will be  checking in on pt to ensure pt is coping and managing throughout care and tx.

## 2024-01-09 NOTE — ADDENDUM NOTE
Encounter addended by: Anthony Garland on: 1/9/2024 10:08 AM   Actions taken: Charge Capture section accepted

## 2024-01-09 NOTE — ADDENDUM NOTE
Encounter addended by: Anthony Garland on: 1/9/2024 10:04 AM   Actions taken: Charge Capture section accepted

## 2024-01-15 ENCOUNTER — OFFICE VISIT (OUTPATIENT)
Dept: HEMATOLOGY/ONCOLOGY | Facility: CLINIC | Age: 49
End: 2024-01-15
Payer: COMMERCIAL

## 2024-01-15 VITALS
OXYGEN SATURATION: 95 % | TEMPERATURE: 98.1 F | HEIGHT: 68 IN | HEART RATE: 77 BPM | RESPIRATION RATE: 18 BRPM | WEIGHT: 238.1 LBS | SYSTOLIC BLOOD PRESSURE: 139 MMHG | DIASTOLIC BLOOD PRESSURE: 89 MMHG | BODY MASS INDEX: 36.09 KG/M2

## 2024-01-15 DIAGNOSIS — C81.72 OTHER CLASSICAL HODGKIN LYMPHOMA OF INTRATHORACIC LYMPH NODES (MULTI): Primary | ICD-10-CM

## 2024-01-15 PROCEDURE — 99215 OFFICE O/P EST HI 40 MIN: CPT | Performed by: INTERNAL MEDICINE

## 2024-01-15 PROCEDURE — 3075F SYST BP GE 130 - 139MM HG: CPT | Performed by: INTERNAL MEDICINE

## 2024-01-15 PROCEDURE — 3079F DIAST BP 80-89 MM HG: CPT | Performed by: INTERNAL MEDICINE

## 2024-01-15 PROCEDURE — 1036F TOBACCO NON-USER: CPT | Performed by: INTERNAL MEDICINE

## 2024-01-15 ASSESSMENT — PAIN SCALES - GENERAL: PAINLEVEL: 0-NO PAIN

## 2024-01-15 NOTE — PROGRESS NOTES
Patient ID: Edward Wolfe is a 48 y.o. male.  Referring Physician: Abelino Flores MD  [address] [facility], [place] [address]  Primary Care Provider: Nellie Harrison DO  Cancer history:    Classical Hodgkin's lymphoma involving right paratracheal lymph node.  PET scan positive in mediastinum as well as left supraglottic.  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  CT scan of chest revealed interval increase in the size of an irregular mass in the anterior mediastinum with associated calcification when compared to prior CT scan examination on August 11, 2021.  Prominent mediastinal lymphadenopathy is also noted which is increased compared to prior examination in 2021.  Findings are suspicious.  An EBUS by  on November 7, 2023 revealed anterior mediastinal paratracheal lymph node mass.  Biopsy consistent with classic Hodgkin's lymphoma.  A PET scan on September 18, 2023 revealedIMPRESSION:  1. Hypermetabolic anterior mediastinal mass which is concerning for  neoplasm. Further evaluation with soft tissue biopsy can be  considered.  2. Hypermetabolic mediastinal and right supraclavicular lymph nodes  which are concerning for metastatic lymphadenopathy.  3. No evidence of distal hypermetabolic lesions concerning for  metastases. the patient was referred for further evaluation and management.    At interview on November 22, 2023 the patient and his wife narrated entire history.  The patient is asymptomatic.  Denied a history of weight loss, fevers, night sweats, chest pain, shortness of breath, nausea, vomiting, hematemesis, melena, hematochezia and hematuria.    The patient and his wife had come for follow-up on December 26, 2023 regarding history of stage IIa classical Hodgkin's lymphoma involving left  "supraclavicular and right mediastinal lymph nodes.  The patient is asymptomatic.  Epuomd-m-[id number] was placed, color Doppler echocardiogram and pulmonary function tests were done as well.  The patient is asymptomatic.    The patient and wife had come for follow-up regarding history of stage IIa classical Hodgkin's lymphoma involving left supraclavicular and right mediastinal lymph nodes.  The patient was placed on a chemotherapy regimen using ABVD.  He has received and tolerated cycle 1 day 1 without any problems.    Past medical history:    Hypertension, hypercholesterolemia.    Past surgical history:    Right shoulder and right hip surgery.    Colonoscopy:    September 2023.    Family history:    Reviewed but not relevant.    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    Review of Systems   All other systems reviewed and are negative.       Objective   BSA: 2.27 meters squared  /89 (BP Location: Right arm, Patient Position: Sitting, BP Cuff Size: Large adult long)   Pulse 77   Temp 36.7 °C (98.1 °F) (Temporal)   Resp 18   Ht 1.715 m (5' 7.52\")   Wt 108 kg (238 lb 1.6 oz)   SpO2 95%   BMI 36.72 kg/m²     No family history on file.  Oncology History   Hodgkin lymphoma of intrathoracic lymph nodes (CMS/HCC)   11/22/2023 Initial Diagnosis    Hodgkin lymphoma of intrathoracic lymph nodes (CMS/HCC)     1/5/2024 -  Chemotherapy    ABVD (DOXOrubicin / Bleomycin / VinBLAStine / Dacarbazine), 28 Day Cycles         Edward Wolfe  reports that he has never smoked. He has never been exposed to tobacco smoke. He has never used smokeless tobacco.  He  reports current alcohol use.  He  reports no history of drug use.    Physical Exam  Constitutional:       Appearance: Normal appearance.   HENT:      Head: Normocephalic and atraumatic.      Nose: Nose normal.      Mouth/Throat:      Mouth: Mucous " membranes are moist.      Pharynx: Oropharynx is clear.   Eyes:      Extraocular Movements: Extraocular movements intact.      Conjunctiva/sclera: Conjunctivae normal.      Pupils: Pupils are equal, round, and reactive to light.   Cardiovascular:      Rate and Rhythm: Normal rate and regular rhythm.   Pulmonary:      Effort: Pulmonary effort is normal.      Breath sounds: Normal breath sounds.   Abdominal:      General: Abdomen is flat. Bowel sounds are normal.      Palpations: Abdomen is soft.   Musculoskeletal:         General: Normal range of motion.      Cervical back: Normal range of motion and neck supple.   Neurological:      General: No focal deficit present.      Mental Status: He is alert and oriented to person, place, and time. Mental status is at baseline.   Psychiatric:         Mood and Affect: Mood normal.         Behavior: Behavior normal.         Thought Content: Thought content normal.         Judgment: Judgment normal.         Performance Status:  Asymptomatic    Assessment/Plan    The patient is a 48-year-old man with past medical history of hypertension, hypercholesterolemia and a newly diagnosed classical Hodgkin's lymphoma involving mediastinum as well as possible right supraclavicular lymph node.  Physical examination was within normal limits.  There was no palpable cervical axillary inguinal lymphadenopathy.  I had a detailed discussion with the patient explained to him that it would be prudent to obtain more staging information such as bone marrow aspiration biopsy and also consider color Doppler echocardiogram, pulmonary function test as well as a port placement.  The patient and his wife understood appreciated all the details provided and were grateful.    Patient and his wife had come for follow-up on December 26, 2023 regarding history of classical Hodgkin's lymphoma involving left supra clavicular and mediastinal/hilar lymph nodes, stage IIa.  Color Doppler echocardiogram revealed  ejection fraction 65% pulmonary function tests in reference range, Nriamw-f-Evnt was placed.  And a detailed discussion with the patient explained to him that if agreeable would recommend chemotherapy using Adriamycin, bleomycin, vinblastine, dacarbazine every 2 weeks x 4 cycles followed by PET scan and then radiation oncology evaluation.  Other option to do nothing.  After thinking through all the options the patient is agreeable to start chemotherapy.  Effect side effects explained.  Printed material from [facility] provided.  Informed consent obtained.  The patient to start chemotherapy first week of January 2024.  The patient and wife had come for follow-up regarding history of stage IIa classical Hodgkin's lymphoma involving left supraclavicular and right mediastinal lymph nodes.  The patient was placed on a chemotherapy regimen using ABVD.  He has received and tolerated cycle 1 day 1 without any problems.  The patient to receive cycle 1 day 15 on January 19, 2024.    Thank you for allowing me to participate in the care of your patient if you have any questions please feel free to call me      Diagnoses and all orders for this visit:  Hodgkin lymphoma of intrathoracic lymph nodes, unspecified Hodgkin lymphoma type (CMS/HCC)  -     Bone Marrow Evaluation  -     Onco-Echo Complete (Strain And 3D); Future  -     Pulmonary function testing; Future  Lymph node enlargement  -     Referral to Malignant Hematology (Hematology / Oncology)  -     Bone Marrow Evaluation  -     Onco-Echo Complete (Strain And 3D); Future  -     Pulmonary function testing; Future  Other classical Hodgkin lymphoma of lymph nodes of multiple regions (CMS/HCC)  -     Bone Marrow Evaluation  -     Onco-Echo Complete (Strain And 3D); Future  -     Pulmonary function testing; Future           Abelino Flores MD

## 2024-01-17 ENCOUNTER — INFUSION (OUTPATIENT)
Dept: HEMATOLOGY/ONCOLOGY | Facility: CLINIC | Age: 49
End: 2024-01-17
Payer: COMMERCIAL

## 2024-01-17 DIAGNOSIS — C81.72 OTHER CLASSICAL HODGKIN LYMPHOMA OF INTRATHORACIC LYMPH NODES (MULTI): ICD-10-CM

## 2024-01-17 LAB
ALBUMIN SERPL BCP-MCNC: 4.3 G/DL (ref 3.4–5)
ALP SERPL-CCNC: 64 U/L (ref 33–120)
ALT SERPL W P-5'-P-CCNC: 33 U/L (ref 10–52)
ANION GAP SERPL CALC-SCNC: 10 MMOL/L (ref 10–20)
AST SERPL W P-5'-P-CCNC: 22 U/L (ref 9–39)
BASOPHILS # BLD AUTO: 0.02 X10*3/UL (ref 0–0.1)
BASOPHILS NFR BLD AUTO: 0.6 %
BILIRUB SERPL-MCNC: 0.3 MG/DL (ref 0–1.2)
BUN SERPL-MCNC: 13 MG/DL (ref 6–23)
CALCIUM SERPL-MCNC: 9 MG/DL (ref 8.6–10.3)
CHLORIDE SERPL-SCNC: 108 MMOL/L (ref 98–107)
CO2 SERPL-SCNC: 25 MMOL/L (ref 21–32)
CREAT SERPL-MCNC: 0.95 MG/DL (ref 0.5–1.3)
EGFRCR SERPLBLD CKD-EPI 2021: >90 ML/MIN/1.73M*2
EOSINOPHIL # BLD AUTO: 0.15 X10*3/UL (ref 0–0.7)
EOSINOPHIL NFR BLD AUTO: 4.8 %
ERYTHROCYTE [DISTWIDTH] IN BLOOD BY AUTOMATED COUNT: 11.8 % (ref 11.5–14.5)
GLUCOSE SERPL-MCNC: 85 MG/DL (ref 74–99)
HCT VFR BLD AUTO: 38.8 % (ref 41–52)
HGB BLD-MCNC: 13.4 G/DL (ref 13.5–17.5)
IMM GRANULOCYTES # BLD AUTO: 0.01 X10*3/UL (ref 0–0.7)
IMM GRANULOCYTES NFR BLD AUTO: 0.3 % (ref 0–0.9)
LYMPHOCYTES # BLD AUTO: 1.37 X10*3/UL (ref 1.2–4.8)
LYMPHOCYTES NFR BLD AUTO: 43.8 %
MCH RBC QN AUTO: 30.1 PG (ref 26–34)
MCHC RBC AUTO-ENTMCNC: 34.5 G/DL (ref 32–36)
MCV RBC AUTO: 87 FL (ref 80–100)
MONOCYTES # BLD AUTO: 0.48 X10*3/UL (ref 0.1–1)
MONOCYTES NFR BLD AUTO: 15.3 %
NEUTROPHILS # BLD AUTO: 1.1 X10*3/UL (ref 1.2–7.7)
NEUTROPHILS NFR BLD AUTO: 35.2 %
NRBC BLD-RTO: 0 /100 WBCS (ref 0–0)
PLATELET # BLD AUTO: 458 X10*3/UL (ref 150–450)
POTASSIUM SERPL-SCNC: 4.1 MMOL/L (ref 3.5–5.3)
PROT SERPL-MCNC: 7 G/DL (ref 6.4–8.2)
RBC # BLD AUTO: 4.45 X10*6/UL (ref 4.5–5.9)
SODIUM SERPL-SCNC: 139 MMOL/L (ref 136–145)
WBC # BLD AUTO: 3.1 X10*3/UL (ref 4.4–11.3)

## 2024-01-17 PROCEDURE — 80053 COMPREHEN METABOLIC PANEL: CPT

## 2024-01-17 PROCEDURE — 96523 IRRIG DRUG DELIVERY DEVICE: CPT

## 2024-01-17 PROCEDURE — 85025 COMPLETE CBC W/AUTO DIFF WBC: CPT

## 2024-01-17 PROCEDURE — 2500000004 HC RX 250 GENERAL PHARMACY W/ HCPCS (ALT 636 FOR OP/ED): Performed by: INTERNAL MEDICINE

## 2024-01-17 PROCEDURE — 36591 DRAW BLOOD OFF VENOUS DEVICE: CPT

## 2024-01-17 RX ORDER — HEPARIN SODIUM,PORCINE/PF 10 UNIT/ML
50 SYRINGE (ML) INTRAVENOUS AS NEEDED
Status: CANCELLED | OUTPATIENT
Start: 2024-01-17

## 2024-01-17 RX ORDER — HEPARIN 100 UNIT/ML
500 SYRINGE INTRAVENOUS AS NEEDED
Status: DISCONTINUED | OUTPATIENT
Start: 2024-01-17 | End: 2024-01-17 | Stop reason: HOSPADM

## 2024-01-17 RX ORDER — HEPARIN 100 UNIT/ML
500 SYRINGE INTRAVENOUS AS NEEDED
Status: CANCELLED | OUTPATIENT
Start: 2024-01-17

## 2024-01-17 RX ADMIN — HEPARIN 500 UNITS: 100 SYRINGE at 11:20

## 2024-01-18 ENCOUNTER — APPOINTMENT (OUTPATIENT)
Dept: HEMATOLOGY/ONCOLOGY | Facility: CLINIC | Age: 49
End: 2024-01-18
Payer: COMMERCIAL

## 2024-01-19 ENCOUNTER — INFUSION (OUTPATIENT)
Dept: HEMATOLOGY/ONCOLOGY | Facility: CLINIC | Age: 49
End: 2024-01-19
Payer: COMMERCIAL

## 2024-01-19 VITALS
TEMPERATURE: 96.8 F | OXYGEN SATURATION: 96 % | DIASTOLIC BLOOD PRESSURE: 89 MMHG | RESPIRATION RATE: 18 BRPM | SYSTOLIC BLOOD PRESSURE: 142 MMHG | WEIGHT: 237.66 LBS | HEART RATE: 64 BPM | BODY MASS INDEX: 36.65 KG/M2

## 2024-01-19 DIAGNOSIS — C81.72 OTHER CLASSICAL HODGKIN LYMPHOMA OF INTRATHORACIC LYMPH NODES (MULTI): ICD-10-CM

## 2024-01-19 PROCEDURE — 96374 THER/PROPH/DIAG INJ IV PUSH: CPT | Mod: INF

## 2024-01-19 PROCEDURE — 96409 CHEMO IV PUSH SNGL DRUG: CPT

## 2024-01-19 PROCEDURE — 2500000004 HC RX 250 GENERAL PHARMACY W/ HCPCS (ALT 636 FOR OP/ED): Performed by: INTERNAL MEDICINE

## 2024-01-19 PROCEDURE — 96413 CHEMO IV INFUSION 1 HR: CPT

## 2024-01-19 PROCEDURE — 96375 TX/PRO/DX INJ NEW DRUG ADDON: CPT | Mod: INF

## 2024-01-19 PROCEDURE — 96417 CHEMO IV INFUS EACH ADDL SEQ: CPT

## 2024-01-19 PROCEDURE — 96411 CHEMO IV PUSH ADDL DRUG: CPT

## 2024-01-19 PROCEDURE — 96367 TX/PROPH/DG ADDL SEQ IV INF: CPT

## 2024-01-19 PROCEDURE — 96523 IRRIG DRUG DELIVERY DEVICE: CPT

## 2024-01-19 RX ORDER — DIPHENHYDRAMINE HYDROCHLORIDE 50 MG/ML
50 INJECTION INTRAMUSCULAR; INTRAVENOUS AS NEEDED
Status: DISCONTINUED | OUTPATIENT
Start: 2024-01-19 | End: 2024-01-19 | Stop reason: HOSPADM

## 2024-01-19 RX ORDER — EPINEPHRINE 0.3 MG/.3ML
0.3 INJECTION SUBCUTANEOUS EVERY 5 MIN PRN
Status: DISCONTINUED | OUTPATIENT
Start: 2024-01-19 | End: 2024-01-19 | Stop reason: HOSPADM

## 2024-01-19 RX ORDER — DOXORUBICIN HYDROCHLORIDE 2 MG/ML
25 INJECTION, SOLUTION INTRAVENOUS ONCE
Status: COMPLETED | OUTPATIENT
Start: 2024-01-19 | End: 2024-01-19

## 2024-01-19 RX ORDER — ALBUTEROL SULFATE 0.83 MG/ML
3 SOLUTION RESPIRATORY (INHALATION) AS NEEDED
Status: DISCONTINUED | OUTPATIENT
Start: 2024-01-19 | End: 2024-01-19 | Stop reason: HOSPADM

## 2024-01-19 RX ORDER — HEPARIN 100 UNIT/ML
500 SYRINGE INTRAVENOUS AS NEEDED
Status: DISCONTINUED | OUTPATIENT
Start: 2024-01-19 | End: 2024-01-19 | Stop reason: HOSPADM

## 2024-01-19 RX ORDER — HEPARIN 100 UNIT/ML
500 SYRINGE INTRAVENOUS AS NEEDED
Status: CANCELLED | OUTPATIENT
Start: 2024-01-19

## 2024-01-19 RX ORDER — PALONOSETRON 0.05 MG/ML
0.25 INJECTION, SOLUTION INTRAVENOUS ONCE
Status: COMPLETED | OUTPATIENT
Start: 2024-01-19 | End: 2024-01-19

## 2024-01-19 RX ORDER — HEPARIN SODIUM,PORCINE/PF 10 UNIT/ML
50 SYRINGE (ML) INTRAVENOUS AS NEEDED
Status: DISCONTINUED | OUTPATIENT
Start: 2024-01-19 | End: 2024-01-19 | Stop reason: HOSPADM

## 2024-01-19 RX ORDER — BLEOMYCIN 30 [USP'U]/1
10 POWDER, FOR SOLUTION INTRAMUSCULAR; INTRAPLEURAL; INTRAVENOUS; SUBCUTANEOUS ONCE
Status: COMPLETED | OUTPATIENT
Start: 2024-01-19 | End: 2024-01-19

## 2024-01-19 RX ORDER — PROCHLORPERAZINE EDISYLATE 5 MG/ML
10 INJECTION INTRAMUSCULAR; INTRAVENOUS EVERY 6 HOURS PRN
Status: DISCONTINUED | OUTPATIENT
Start: 2024-01-19 | End: 2024-01-19 | Stop reason: HOSPADM

## 2024-01-19 RX ORDER — PROCHLORPERAZINE MALEATE 10 MG
10 TABLET ORAL EVERY 6 HOURS PRN
Status: DISCONTINUED | OUTPATIENT
Start: 2024-01-19 | End: 2024-01-19 | Stop reason: HOSPADM

## 2024-01-19 RX ORDER — FAMOTIDINE 10 MG/ML
20 INJECTION INTRAVENOUS ONCE AS NEEDED
Status: DISCONTINUED | OUTPATIENT
Start: 2024-01-19 | End: 2024-01-19 | Stop reason: HOSPADM

## 2024-01-19 RX ORDER — HEPARIN SODIUM,PORCINE/PF 10 UNIT/ML
50 SYRINGE (ML) INTRAVENOUS AS NEEDED
Status: CANCELLED | OUTPATIENT
Start: 2024-01-19

## 2024-01-19 RX ADMIN — DEXAMETHASONE SODIUM PHOSPHATE 12 MG: 10 INJECTION, SOLUTION INTRAMUSCULAR; INTRAVENOUS at 09:54

## 2024-01-19 RX ADMIN — HEPARIN 500 UNITS: 100 SYRINGE at 12:09

## 2024-01-19 RX ADMIN — SODIUM CHLORIDE 150 MG: 9 INJECTION, SOLUTION INTRAVENOUS at 09:55

## 2024-01-19 RX ADMIN — BLEOMYCIN SULFATE 22.9 UNITS: 30 POWDER, FOR SOLUTION INTRAMUSCULAR; INTRAPLEURAL; INTRAVENOUS; SUBCUTANEOUS at 11:16

## 2024-01-19 RX ADMIN — VINBLASTINE SULFATE 13.5 MG: 1 INJECTION INTRAVENOUS at 11:28

## 2024-01-19 RX ADMIN — DACARBAZINE 900 MG: 10 INJECTION, POWDER, FOR SOLUTION INTRAVENOUS at 11:50

## 2024-01-19 RX ADMIN — PALONOSETRON 250 MCG: 0.05 INJECTION, SOLUTION INTRAVENOUS at 09:54

## 2024-01-19 RX ADMIN — DOXORUBICIN HYDROCHLORIDE 58 MG: 2 INJECTION, SOLUTION INTRAVENOUS at 10:51

## 2024-01-19 ASSESSMENT — PAIN SCALES - GENERAL: PAINLEVEL: 0-NO PAIN

## 2024-01-19 NOTE — SIGNIFICANT EVENT
01/19/24 0946   Prechemo Checklist   Has the patient been in the hospital, ED, or urgent care since last date of service No   Chemo/Immuno Consent Signed Yes   Protocol/Indications Verified Yes   Confirmed to previous date/time of medication Yes   Compared to previous dose Yes   All medications are dated accurately Yes   Pregnancy Test Negative Not applicable   Parameters Met Yes   BSA/Weight-Height Verified Yes   Dose Calculations Verified Yes

## 2024-01-29 ENCOUNTER — APPOINTMENT (OUTPATIENT)
Dept: HEMATOLOGY/ONCOLOGY | Facility: CLINIC | Age: 49
End: 2024-01-29
Payer: COMMERCIAL

## 2024-01-30 DIAGNOSIS — I10 ESSENTIAL (PRIMARY) HYPERTENSION: ICD-10-CM

## 2024-01-30 DIAGNOSIS — F41.9 ANXIETY DISORDER, UNSPECIFIED: ICD-10-CM

## 2024-01-30 DIAGNOSIS — E78.1 PURE HYPERGLYCERIDEMIA: ICD-10-CM

## 2024-01-30 DIAGNOSIS — E78.00 PURE HYPERCHOLESTEROLEMIA, UNSPECIFIED: ICD-10-CM

## 2024-01-30 RX ORDER — ATORVASTATIN CALCIUM 40 MG/1
TABLET, FILM COATED ORAL
Qty: 90 TABLET | Refills: 1 | Status: SHIPPED | OUTPATIENT
Start: 2024-01-30

## 2024-01-30 RX ORDER — SERTRALINE HYDROCHLORIDE 100 MG/1
150 TABLET, FILM COATED ORAL DAILY
Qty: 135 TABLET | Refills: 1 | Status: SHIPPED | OUTPATIENT
Start: 2024-01-30

## 2024-01-30 RX ORDER — LISINOPRIL 10 MG/1
TABLET ORAL
Qty: 90 TABLET | Refills: 1 | Status: SHIPPED | OUTPATIENT
Start: 2024-01-30

## 2024-01-30 RX ORDER — GEMFIBROZIL 600 MG/1
TABLET, FILM COATED ORAL
Qty: 90 TABLET | Refills: 1 | Status: SHIPPED | OUTPATIENT
Start: 2024-01-30

## 2024-01-31 ENCOUNTER — OFFICE VISIT (OUTPATIENT)
Dept: HEMATOLOGY/ONCOLOGY | Facility: CLINIC | Age: 49
End: 2024-01-31
Payer: COMMERCIAL

## 2024-01-31 ENCOUNTER — INFUSION (OUTPATIENT)
Dept: HEMATOLOGY/ONCOLOGY | Facility: CLINIC | Age: 49
End: 2024-01-31
Payer: COMMERCIAL

## 2024-01-31 VITALS
HEART RATE: 77 BPM | RESPIRATION RATE: 18 BRPM | HEIGHT: 68 IN | TEMPERATURE: 97.7 F | DIASTOLIC BLOOD PRESSURE: 80 MMHG | OXYGEN SATURATION: 95 % | WEIGHT: 236.77 LBS | SYSTOLIC BLOOD PRESSURE: 133 MMHG | BODY MASS INDEX: 35.88 KG/M2

## 2024-01-31 DIAGNOSIS — C81.72 OTHER CLASSICAL HODGKIN LYMPHOMA OF INTRATHORACIC LYMPH NODES (MULTI): ICD-10-CM

## 2024-01-31 LAB
ALBUMIN SERPL BCP-MCNC: 4.6 G/DL (ref 3.4–5)
ALP SERPL-CCNC: 55 U/L (ref 33–120)
ALT SERPL W P-5'-P-CCNC: 33 U/L (ref 10–52)
ANION GAP SERPL CALC-SCNC: 11 MMOL/L (ref 10–20)
AST SERPL W P-5'-P-CCNC: 25 U/L (ref 9–39)
BASOPHILS # BLD MANUAL: 0.05 X10*3/UL (ref 0–0.1)
BASOPHILS NFR BLD MANUAL: 2 %
BILIRUB SERPL-MCNC: 0.3 MG/DL (ref 0–1.2)
BUN SERPL-MCNC: 17 MG/DL (ref 6–23)
CALCIUM SERPL-MCNC: 9.5 MG/DL (ref 8.6–10.3)
CHLORIDE SERPL-SCNC: 108 MMOL/L (ref 98–107)
CO2 SERPL-SCNC: 25 MMOL/L (ref 21–32)
CREAT SERPL-MCNC: 0.96 MG/DL (ref 0.5–1.3)
EGFRCR SERPLBLD CKD-EPI 2021: >90 ML/MIN/1.73M*2
EOSINOPHIL # BLD MANUAL: 0.03 X10*3/UL (ref 0–0.7)
EOSINOPHIL NFR BLD MANUAL: 1.5 %
ERYTHROCYTE [DISTWIDTH] IN BLOOD BY AUTOMATED COUNT: 11.9 % (ref 11.5–14.5)
GLUCOSE SERPL-MCNC: 116 MG/DL (ref 74–99)
HCT VFR BLD AUTO: 38.8 % (ref 41–52)
HGB BLD-MCNC: 13.2 G/DL (ref 13.5–17.5)
IMM GRANULOCYTES # BLD AUTO: 0.01 X10*3/UL (ref 0–0.7)
IMM GRANULOCYTES NFR BLD AUTO: 0.4 % (ref 0–0.9)
LDH SERPL L TO P-CCNC: 130 U/L (ref 84–246)
LYMPHOCYTES # BLD MANUAL: 1.1 X10*3/UL (ref 1.2–4.8)
LYMPHOCYTES NFR BLD MANUAL: 48 %
MCH RBC QN AUTO: 29.9 PG (ref 26–34)
MCHC RBC AUTO-ENTMCNC: 34 G/DL (ref 32–36)
MCV RBC AUTO: 88 FL (ref 80–100)
MONOCYTES # BLD MANUAL: 0.26 X10*3/UL (ref 0.1–1)
MONOCYTES NFR BLD MANUAL: 11.5 %
NEUTROPHILS # BLD MANUAL: 0.84 X10*3/UL (ref 1.2–7.7)
NEUTS BAND # BLD MANUAL: 0.01 X10*3/UL (ref 0–0.7)
NEUTS BAND NFR BLD MANUAL: 0.5 %
NEUTS SEG # BLD MANUAL: 0.83 X10*3/UL (ref 1.2–7)
NEUTS SEG NFR BLD MANUAL: 36 %
NRBC BLD-RTO: 0 /100 WBCS (ref 0–0)
PLATELET # BLD AUTO: 479 X10*3/UL (ref 150–450)
POLYCHROMASIA BLD QL SMEAR: ABNORMAL
POTASSIUM SERPL-SCNC: 4.1 MMOL/L (ref 3.5–5.3)
PROT SERPL-MCNC: 7.2 G/DL (ref 6.4–8.2)
RBC # BLD AUTO: 4.42 X10*6/UL (ref 4.5–5.9)
RBC MORPH BLD: ABNORMAL
SODIUM SERPL-SCNC: 140 MMOL/L (ref 136–145)
TOTAL CELLS COUNTED BLD: 100
URATE SERPL-MCNC: 4.8 MG/DL (ref 4–7.5)
VARIANT LYMPHS # BLD MANUAL: 0.01 X10*3/UL (ref 0–0.5)
VARIANT LYMPHS NFR BLD: 0.5 %
WBC # BLD AUTO: 2.3 X10*3/UL (ref 4.4–11.3)

## 2024-01-31 PROCEDURE — 99215 OFFICE O/P EST HI 40 MIN: CPT | Performed by: INTERNAL MEDICINE

## 2024-01-31 PROCEDURE — 3075F SYST BP GE 130 - 139MM HG: CPT | Performed by: INTERNAL MEDICINE

## 2024-01-31 PROCEDURE — 85007 BL SMEAR W/DIFF WBC COUNT: CPT

## 2024-01-31 PROCEDURE — 85027 COMPLETE CBC AUTOMATED: CPT

## 2024-01-31 PROCEDURE — 36591 DRAW BLOOD OFF VENOUS DEVICE: CPT

## 2024-01-31 PROCEDURE — 80053 COMPREHEN METABOLIC PANEL: CPT

## 2024-01-31 PROCEDURE — 96523 IRRIG DRUG DELIVERY DEVICE: CPT

## 2024-01-31 PROCEDURE — 84550 ASSAY OF BLOOD/URIC ACID: CPT

## 2024-01-31 PROCEDURE — 1036F TOBACCO NON-USER: CPT | Performed by: INTERNAL MEDICINE

## 2024-01-31 PROCEDURE — 3079F DIAST BP 80-89 MM HG: CPT | Performed by: INTERNAL MEDICINE

## 2024-01-31 PROCEDURE — 83615 LACTATE (LD) (LDH) ENZYME: CPT

## 2024-01-31 PROCEDURE — 2500000004 HC RX 250 GENERAL PHARMACY W/ HCPCS (ALT 636 FOR OP/ED): Performed by: INTERNAL MEDICINE

## 2024-01-31 RX ORDER — HEPARIN 100 UNIT/ML
500 SYRINGE INTRAVENOUS AS NEEDED
Status: CANCELLED | OUTPATIENT
Start: 2024-01-31

## 2024-01-31 RX ORDER — HEPARIN SODIUM,PORCINE/PF 10 UNIT/ML
50 SYRINGE (ML) INTRAVENOUS AS NEEDED
Status: CANCELLED | OUTPATIENT
Start: 2024-01-31

## 2024-01-31 RX ORDER — HEPARIN 100 UNIT/ML
500 SYRINGE INTRAVENOUS AS NEEDED
Status: DISCONTINUED | OUTPATIENT
Start: 2024-01-31 | End: 2024-01-31 | Stop reason: HOSPADM

## 2024-01-31 RX ADMIN — HEPARIN 500 UNITS: 100 SYRINGE at 08:45

## 2024-01-31 ASSESSMENT — PAIN SCALES - GENERAL: PAINLEVEL: 0-NO PAIN

## 2024-01-31 NOTE — PROGRESS NOTES
Patient ID: Edward Wolfe is a 48 y.o. male.  Referring Physician: No referring provider defined for this encounter.  Primary Care Provider: Nellie Harrison DO  Cancer history:    Classical Hodgkin's lymphoma involving right paratracheal lymph node.  PET scan positive in mediastinum as well as left supraglottic.  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  CT scan of chest revealed interval increase in the size of an irregular mass in the anterior mediastinum with associated calcification when compared to prior CT scan examination on August 11, 2021.  Prominent mediastinal lymphadenopathy is also noted which is increased compared to prior examination in 2021.  Findings are suspicious.  An EBUS by  on November 7, 2023 revealed anterior mediastinal paratracheal lymph node mass.  Biopsy consistent with classic Hodgkin's lymphoma.  A PET scan on September 18, 2023 revealedIMPRESSION:  1. Hypermetabolic anterior mediastinal mass which is concerning for  neoplasm. Further evaluation with soft tissue biopsy can be  considered.  2. Hypermetabolic mediastinal and right supraclavicular lymph nodes  which are concerning for metastatic lymphadenopathy.  3. No evidence of distal hypermetabolic lesions concerning for  metastases. the patient was referred for further evaluation and management.    At interview on November 22, 2023 the patient and his wife narrated entire history.  The patient is asymptomatic.  Denied a history of weight loss, fevers, night sweats, chest pain, shortness of breath, nausea, vomiting, hematemesis, melena, hematochezia and hematuria.    The patient and his wife had come for follow-up on December 26, 2023 regarding history of stage IIa classical Hodgkin's lymphoma involving left supraclavicular and right  "mediastinal lymph nodes.  The patient is asymptomatic.  Jkzyua-h-Ukmx was placed, color Doppler echocardiogram and pulmonary function tests were done as well.  The patient is asymptomatic.    The patient and wife had come for follow-up January 31, 2024 regarding history of stage IIa classical Hodgkin's lymphoma involving left supraclavicular and right mediastinal lymph nodes.  The patient was placed on a chemotherapy regimen using ABVD.  He has received and tolerated cycle 1 day 1 and 14 without any problems.    Past medical history:    Hypertension, hypercholesterolemia.    Past surgical history:    Right shoulder and right hip surgery.    Colonoscopy:    September 2023.    Family history:    Reviewed but not relevant.    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    Review of Systems   All other systems reviewed and are negative.       Objective   BSA: 2.26 meters squared  /80 (BP Location: Right arm, Patient Position: Sitting, BP Cuff Size: Large adult long)   Pulse 77   Temp 36.5 °C (97.7 °F) (Temporal)   Resp 18   Ht 1.715 m (5' 7.52\")   Wt 107 kg (236 lb 12.4 oz)   SpO2 95%   BMI 36.52 kg/m²     No family history on file.  Oncology History   Hodgkin lymphoma of intrathoracic lymph nodes (CMS/HCC)   11/22/2023 Initial Diagnosis    Hodgkin lymphoma of intrathoracic lymph nodes (CMS/HCC)     1/5/2024 -  Chemotherapy    ABVD (DOXOrubicin / Bleomycin / VinBLAStine / Dacarbazine), 28 Day Cycles         Edward Wolfe  reports that he has never smoked. He has never been exposed to tobacco smoke. He has never used smokeless tobacco.  He  reports current alcohol use.  He  reports no history of drug use.    Physical Exam  Constitutional:       Appearance: Normal appearance.   HENT:      Head: Normocephalic and atraumatic.      Nose: Nose normal.      Mouth/Throat:      Mouth: Mucous " membranes are moist.      Pharynx: Oropharynx is clear.   Eyes:      Extraocular Movements: Extraocular movements intact.      Conjunctiva/sclera: Conjunctivae normal.      Pupils: Pupils are equal, round, and reactive to light.   Cardiovascular:      Rate and Rhythm: Normal rate and regular rhythm.   Pulmonary:      Effort: Pulmonary effort is normal.      Breath sounds: Normal breath sounds.   Abdominal:      General: Abdomen is flat. Bowel sounds are normal.      Palpations: Abdomen is soft.   Musculoskeletal:         General: Normal range of motion.      Cervical back: Normal range of motion and neck supple.   Neurological:      General: No focal deficit present.      Mental Status: He is alert and oriented to person, place, and time. Mental status is at baseline.   Psychiatric:         Mood and Affect: Mood normal.         Behavior: Behavior normal.         Thought Content: Thought content normal.         Judgment: Judgment normal.         Performance Status:  Asymptomatic    Assessment/Plan    The patient is a 48-year-old man with past medical history of hypertension, hypercholesterolemia and a newly diagnosed classical Hodgkin's lymphoma involving mediastinum as well as possible right supraclavicular lymph node.  Physical examination was within normal limits.  There was no palpable cervical axillary inguinal lymphadenopathy.  I had a detailed discussion with the patient explained to him that it would be prudent to obtain more staging information such as bone marrow aspiration biopsy and also consider color Doppler echocardiogram, pulmonary function test as well as a port placement.  The patient and his wife understood appreciated all the details provided and were grateful.    Patient and his wife had come for follow-up on December 26, 2023 regarding history of classical Hodgkin's lymphoma involving left supra clavicular and mediastinal/hilar lymph nodes, stage IIa.  Color Doppler echocardiogram revealed  ejection fraction 65% pulmonary function tests in reference range, Wqitzf-d-Ywpe was placed.  And a detailed discussion with the patient explained to him that if agreeable would recommend chemotherapy using Adriamycin, bleomycin, vinblastine, dacarbazine every 2 weeks x 4 cycles followed by PET scan and then radiation oncology evaluation.  Other option to do nothing.  After thinking through all the options the patient is agreeable to start chemotherapy.  Effect side effects explained.  Printed material from [facility] provided.  Informed consent obtained.  The patient to start chemotherapy first week of January 2024.  The patient and wife had come for follow-up January 31, 2024 regarding history of stage IIa classical Hodgkin's lymphoma involving left supraclavicular and right mediastinal lymph nodes.  The patient was placed on a chemotherapy regimen using ABVD.  He has received and tolerated cycle 1 day 1 and 14 without any problems.  The patient to receive cycle 2 day 1 on February 2, 2024.  I have recommended restaging PET scan after completion of cycle 2 and return in 4 weeks.    Thank you for allowing me to participate in the care of your patient if you have any questions please feel free to call me      Diagnoses and all orders for this visit:  Hodgkin lymphoma of intrathoracic lymph nodes, unspecified Hodgkin lymphoma type (CMS/HCC)  -     Bone Marrow Evaluation  -     Onco-Echo Complete (Strain And 3D); Future  -     Pulmonary function testing; Future  Lymph node enlargement  -     Referral to Malignant Hematology (Hematology / Oncology)  -     Bone Marrow Evaluation  -     Onco-Echo Complete (Strain And 3D); Future  -     Pulmonary function testing; Future  Other classical Hodgkin lymphoma of lymph nodes of multiple regions (CMS/HCC)  -     Bone Marrow Evaluation  -     Onco-Echo Complete (Strain And 3D); Future  -     Pulmonary function testing; Future           Abelino Flores MD

## 2024-02-01 ENCOUNTER — APPOINTMENT (OUTPATIENT)
Dept: HEMATOLOGY/ONCOLOGY | Facility: CLINIC | Age: 49
End: 2024-02-01
Payer: COMMERCIAL

## 2024-02-02 ENCOUNTER — SOCIAL WORK (OUTPATIENT)
Dept: HEMATOLOGY/ONCOLOGY | Facility: CLINIC | Age: 49
End: 2024-02-02
Payer: COMMERCIAL

## 2024-02-02 ENCOUNTER — INFUSION (OUTPATIENT)
Dept: HEMATOLOGY/ONCOLOGY | Facility: CLINIC | Age: 49
End: 2024-02-02
Payer: COMMERCIAL

## 2024-02-02 VITALS
HEART RATE: 63 BPM | OXYGEN SATURATION: 95 % | RESPIRATION RATE: 20 BRPM | BODY MASS INDEX: 36.35 KG/M2 | SYSTOLIC BLOOD PRESSURE: 142 MMHG | DIASTOLIC BLOOD PRESSURE: 79 MMHG | WEIGHT: 235.67 LBS | TEMPERATURE: 96.1 F

## 2024-02-02 DIAGNOSIS — C81.72 OTHER CLASSICAL HODGKIN LYMPHOMA OF INTRATHORACIC LYMPH NODES (MULTI): ICD-10-CM

## 2024-02-02 PROCEDURE — 96413 CHEMO IV INFUSION 1 HR: CPT

## 2024-02-02 PROCEDURE — 96367 TX/PROPH/DG ADDL SEQ IV INF: CPT

## 2024-02-02 PROCEDURE — 96411 CHEMO IV PUSH ADDL DRUG: CPT

## 2024-02-02 PROCEDURE — 96375 TX/PRO/DX INJ NEW DRUG ADDON: CPT | Mod: INF

## 2024-02-02 PROCEDURE — 2500000004 HC RX 250 GENERAL PHARMACY W/ HCPCS (ALT 636 FOR OP/ED): Mod: JZ | Performed by: INTERNAL MEDICINE

## 2024-02-02 RX ORDER — DIPHENHYDRAMINE HYDROCHLORIDE 50 MG/ML
50 INJECTION INTRAMUSCULAR; INTRAVENOUS AS NEEDED
Status: DISCONTINUED | OUTPATIENT
Start: 2024-02-02 | End: 2024-02-02 | Stop reason: HOSPADM

## 2024-02-02 RX ORDER — HEPARIN 100 UNIT/ML
500 SYRINGE INTRAVENOUS AS NEEDED
Status: DISCONTINUED | OUTPATIENT
Start: 2024-02-02 | End: 2024-02-02 | Stop reason: HOSPADM

## 2024-02-02 RX ORDER — PROCHLORPERAZINE MALEATE 10 MG
10 TABLET ORAL EVERY 6 HOURS PRN
Status: DISCONTINUED | OUTPATIENT
Start: 2024-02-02 | End: 2024-02-02 | Stop reason: HOSPADM

## 2024-02-02 RX ORDER — ALBUTEROL SULFATE 0.83 MG/ML
3 SOLUTION RESPIRATORY (INHALATION) AS NEEDED
Status: DISCONTINUED | OUTPATIENT
Start: 2024-02-02 | End: 2024-02-02 | Stop reason: HOSPADM

## 2024-02-02 RX ORDER — HEPARIN SODIUM,PORCINE/PF 10 UNIT/ML
50 SYRINGE (ML) INTRAVENOUS AS NEEDED
Status: DISCONTINUED | OUTPATIENT
Start: 2024-02-02 | End: 2024-02-02 | Stop reason: HOSPADM

## 2024-02-02 RX ORDER — FAMOTIDINE 10 MG/ML
20 INJECTION INTRAVENOUS ONCE AS NEEDED
Status: DISCONTINUED | OUTPATIENT
Start: 2024-02-02 | End: 2024-02-02 | Stop reason: HOSPADM

## 2024-02-02 RX ORDER — BLEOMYCIN 30 [USP'U]/1
10 POWDER, FOR SOLUTION INTRAMUSCULAR; INTRAPLEURAL; INTRAVENOUS; SUBCUTANEOUS ONCE
Status: COMPLETED | OUTPATIENT
Start: 2024-02-02 | End: 2024-02-02

## 2024-02-02 RX ORDER — HEPARIN 100 UNIT/ML
500 SYRINGE INTRAVENOUS AS NEEDED
Status: CANCELLED | OUTPATIENT
Start: 2024-02-02

## 2024-02-02 RX ORDER — PROCHLORPERAZINE EDISYLATE 5 MG/ML
10 INJECTION INTRAMUSCULAR; INTRAVENOUS EVERY 6 HOURS PRN
Status: DISCONTINUED | OUTPATIENT
Start: 2024-02-02 | End: 2024-02-02 | Stop reason: HOSPADM

## 2024-02-02 RX ORDER — PALONOSETRON 0.05 MG/ML
0.25 INJECTION, SOLUTION INTRAVENOUS ONCE
Status: COMPLETED | OUTPATIENT
Start: 2024-02-02 | End: 2024-02-02

## 2024-02-02 RX ORDER — EPINEPHRINE 0.3 MG/.3ML
0.3 INJECTION SUBCUTANEOUS EVERY 5 MIN PRN
Status: DISCONTINUED | OUTPATIENT
Start: 2024-02-02 | End: 2024-02-02 | Stop reason: HOSPADM

## 2024-02-02 RX ORDER — HEPARIN SODIUM,PORCINE/PF 10 UNIT/ML
50 SYRINGE (ML) INTRAVENOUS AS NEEDED
Status: CANCELLED | OUTPATIENT
Start: 2024-02-02

## 2024-02-02 RX ORDER — DOXORUBICIN HYDROCHLORIDE 2 MG/ML
25 INJECTION, SOLUTION INTRAVENOUS ONCE
Status: COMPLETED | OUTPATIENT
Start: 2024-02-02 | End: 2024-02-02

## 2024-02-02 RX ADMIN — PALONOSETRON 250 MCG: 0.05 INJECTION, SOLUTION INTRAVENOUS at 10:14

## 2024-02-02 RX ADMIN — DACARBAZINE 900 MG: 10 INJECTION, POWDER, FOR SOLUTION INTRAVENOUS at 11:38

## 2024-02-02 RX ADMIN — DOXORUBICIN HYDROCHLORIDE 58 MG: 2 INJECTION, SOLUTION INTRAVENOUS at 10:47

## 2024-02-02 RX ADMIN — SODIUM CHLORIDE 150 MG: 9 INJECTION, SOLUTION INTRAVENOUS at 10:14

## 2024-02-02 RX ADMIN — VINBLASTINE SULFATE 13.5 MG: 1 INJECTION INTRAVENOUS at 11:27

## 2024-02-02 RX ADMIN — HEPARIN 500 UNITS: 100 SYRINGE at 11:51

## 2024-02-02 RX ADMIN — DEXAMETHASONE SODIUM PHOSPHATE 12 MG: 10 INJECTION, SOLUTION INTRAMUSCULAR; INTRAVENOUS at 10:45

## 2024-02-02 RX ADMIN — BLEOMYCIN SULFATE 22.9 UNITS: 30 POWDER, FOR SOLUTION INTRAMUSCULAR; INTRAPLEURAL; INTRAVENOUS; SUBCUTANEOUS at 11:16

## 2024-02-02 ASSESSMENT — COLUMBIA-SUICIDE SEVERITY RATING SCALE - C-SSRS
2. HAVE YOU ACTUALLY HAD ANY THOUGHTS OF KILLING YOURSELF?: NO
1. IN THE PAST MONTH, HAVE YOU WISHED YOU WERE DEAD OR WISHED YOU COULD GO TO SLEEP AND NOT WAKE UP?: NO
6. HAVE YOU EVER DONE ANYTHING, STARTED TO DO ANYTHING, OR PREPARED TO DO ANYTHING TO END YOUR LIFE?: NO

## 2024-02-02 ASSESSMENT — PAIN SCALES - GENERAL: PAINLEVEL: 0-NO PAIN

## 2024-02-02 NOTE — SIGNIFICANT EVENT
02/02/24 0944   Prechemo Checklist   Has the patient been in the hospital, ED, or urgent care since last date of service No   Chemo/Immuno Consent Signed Yes   Protocol/Indications Verified Yes   Confirmed to previous date/time of medication Yes   Compared to previous dose Yes   All medications are dated accurately Yes   Pregnancy Test Negative Not applicable   Parameters Met Yes   BSA/Weight-Height Verified Yes   Dose Calculations Verified Yes
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
## 2024-02-12 ENCOUNTER — APPOINTMENT (OUTPATIENT)
Dept: HEMATOLOGY/ONCOLOGY | Facility: CLINIC | Age: 49
End: 2024-02-12
Payer: COMMERCIAL

## 2024-02-12 DIAGNOSIS — C81.92 HODGKIN LYMPHOMA OF INTRATHORACIC LYMPH NODES, UNSPECIFIED HODGKIN LYMPHOMA TYPE (MULTI): ICD-10-CM

## 2024-02-14 ENCOUNTER — APPOINTMENT (OUTPATIENT)
Dept: HEMATOLOGY/ONCOLOGY | Facility: CLINIC | Age: 49
End: 2024-02-14
Payer: COMMERCIAL

## 2024-02-15 ENCOUNTER — APPOINTMENT (OUTPATIENT)
Dept: HEMATOLOGY/ONCOLOGY | Facility: CLINIC | Age: 49
End: 2024-02-15
Payer: COMMERCIAL

## 2024-02-15 ENCOUNTER — LAB (OUTPATIENT)
Dept: LAB | Facility: LAB | Age: 49
End: 2024-02-15
Payer: COMMERCIAL

## 2024-02-15 DIAGNOSIS — C81.72 OTHER CLASSICAL HODGKIN LYMPHOMA OF INTRATHORACIC LYMPH NODES (MULTI): ICD-10-CM

## 2024-02-15 DIAGNOSIS — C81.92 HODGKIN LYMPHOMA OF INTRATHORACIC LYMPH NODES, UNSPECIFIED HODGKIN LYMPHOMA TYPE (MULTI): ICD-10-CM

## 2024-02-15 LAB
ALBUMIN SERPL BCP-MCNC: 4.9 G/DL (ref 3.4–5)
ALP SERPL-CCNC: 49 U/L (ref 33–120)
ALT SERPL W P-5'-P-CCNC: 31 U/L (ref 10–52)
ANION GAP SERPL CALC-SCNC: 11 MMOL/L (ref 10–20)
AST SERPL W P-5'-P-CCNC: 26 U/L (ref 9–39)
BASOPHILS # BLD AUTO: 0.03 X10*3/UL (ref 0–0.1)
BASOPHILS NFR BLD AUTO: 1 %
BILIRUB SERPL-MCNC: 0.3 MG/DL (ref 0–1.2)
BUN SERPL-MCNC: 21 MG/DL (ref 6–23)
CALCIUM SERPL-MCNC: 9.5 MG/DL (ref 8.6–10.3)
CHLORIDE SERPL-SCNC: 104 MMOL/L (ref 98–107)
CO2 SERPL-SCNC: 27 MMOL/L (ref 21–32)
CREAT SERPL-MCNC: 0.97 MG/DL (ref 0.5–1.3)
EGFRCR SERPLBLD CKD-EPI 2021: >90 ML/MIN/1.73M*2
EOSINOPHIL # BLD AUTO: 0.1 X10*3/UL (ref 0–0.7)
EOSINOPHIL NFR BLD AUTO: 3.2 %
ERYTHROCYTE [DISTWIDTH] IN BLOOD BY AUTOMATED COUNT: 12.9 % (ref 11.5–14.5)
GLUCOSE SERPL-MCNC: 107 MG/DL (ref 74–99)
HCT VFR BLD AUTO: 37.4 % (ref 41–52)
HGB BLD-MCNC: 12.7 G/DL (ref 13.5–17.5)
IMM GRANULOCYTES # BLD AUTO: 0.02 X10*3/UL (ref 0–0.7)
IMM GRANULOCYTES NFR BLD AUTO: 0.6 % (ref 0–0.9)
LYMPHOCYTES # BLD AUTO: 1.57 X10*3/UL (ref 1.2–4.8)
LYMPHOCYTES NFR BLD AUTO: 50.6 %
MCH RBC QN AUTO: 29.8 PG (ref 26–34)
MCHC RBC AUTO-ENTMCNC: 34 G/DL (ref 32–36)
MCV RBC AUTO: 88 FL (ref 80–100)
MONOCYTES # BLD AUTO: 0.72 X10*3/UL (ref 0.1–1)
MONOCYTES NFR BLD AUTO: 23.2 %
NEUTROPHILS # BLD AUTO: 0.66 X10*3/UL (ref 1.2–7.7)
NEUTROPHILS NFR BLD AUTO: 21.4 %
NRBC BLD-RTO: 0 /100 WBCS (ref 0–0)
PLATELET # BLD AUTO: 458 X10*3/UL (ref 150–450)
POTASSIUM SERPL-SCNC: 4.3 MMOL/L (ref 3.5–5.3)
PROT SERPL-MCNC: 7.3 G/DL (ref 6.4–8.2)
RBC # BLD AUTO: 4.26 X10*6/UL (ref 4.5–5.9)
SODIUM SERPL-SCNC: 138 MMOL/L (ref 136–145)
WBC # BLD AUTO: 3.1 X10*3/UL (ref 4.4–11.3)

## 2024-02-15 PROCEDURE — 85025 COMPLETE CBC W/AUTO DIFF WBC: CPT

## 2024-02-15 PROCEDURE — 80053 COMPREHEN METABOLIC PANEL: CPT

## 2024-02-16 ENCOUNTER — INFUSION (OUTPATIENT)
Dept: HEMATOLOGY/ONCOLOGY | Facility: CLINIC | Age: 49
End: 2024-02-16
Payer: COMMERCIAL

## 2024-02-16 VITALS
RESPIRATION RATE: 20 BRPM | WEIGHT: 237.66 LBS | HEART RATE: 77 BPM | SYSTOLIC BLOOD PRESSURE: 153 MMHG | BODY MASS INDEX: 36.65 KG/M2 | DIASTOLIC BLOOD PRESSURE: 90 MMHG | TEMPERATURE: 97.5 F | OXYGEN SATURATION: 96 %

## 2024-02-16 DIAGNOSIS — C81.72 OTHER CLASSICAL HODGKIN LYMPHOMA OF INTRATHORACIC LYMPH NODES (MULTI): ICD-10-CM

## 2024-02-16 PROCEDURE — 96413 CHEMO IV INFUSION 1 HR: CPT

## 2024-02-16 PROCEDURE — 2500000004 HC RX 250 GENERAL PHARMACY W/ HCPCS (ALT 636 FOR OP/ED): Performed by: INTERNAL MEDICINE

## 2024-02-16 PROCEDURE — 96375 TX/PRO/DX INJ NEW DRUG ADDON: CPT | Mod: INF

## 2024-02-16 PROCEDURE — 96523 IRRIG DRUG DELIVERY DEVICE: CPT

## 2024-02-16 PROCEDURE — 96411 CHEMO IV PUSH ADDL DRUG: CPT

## 2024-02-16 PROCEDURE — 96367 TX/PROPH/DG ADDL SEQ IV INF: CPT

## 2024-02-16 RX ORDER — DIPHENHYDRAMINE HYDROCHLORIDE 50 MG/ML
50 INJECTION INTRAMUSCULAR; INTRAVENOUS AS NEEDED
Status: DISCONTINUED | OUTPATIENT
Start: 2024-02-16 | End: 2024-02-16 | Stop reason: HOSPADM

## 2024-02-16 RX ORDER — HEPARIN 100 UNIT/ML
500 SYRINGE INTRAVENOUS AS NEEDED
Status: DISCONTINUED | OUTPATIENT
Start: 2024-02-16 | End: 2024-02-16 | Stop reason: HOSPADM

## 2024-02-16 RX ORDER — BLEOMYCIN 30 [USP'U]/1
10 POWDER, FOR SOLUTION INTRAMUSCULAR; INTRAPLEURAL; INTRAVENOUS; SUBCUTANEOUS ONCE
Status: COMPLETED | OUTPATIENT
Start: 2024-02-16 | End: 2024-02-16

## 2024-02-16 RX ORDER — PALONOSETRON 0.05 MG/ML
0.25 INJECTION, SOLUTION INTRAVENOUS ONCE
Status: COMPLETED | OUTPATIENT
Start: 2024-02-16 | End: 2024-02-16

## 2024-02-16 RX ORDER — HEPARIN 100 UNIT/ML
500 SYRINGE INTRAVENOUS AS NEEDED
Status: CANCELLED | OUTPATIENT
Start: 2024-02-16

## 2024-02-16 RX ORDER — DOXORUBICIN HYDROCHLORIDE 2 MG/ML
25 INJECTION, SOLUTION INTRAVENOUS ONCE
Status: COMPLETED | OUTPATIENT
Start: 2024-02-16 | End: 2024-02-16

## 2024-02-16 RX ORDER — HEPARIN SODIUM,PORCINE/PF 10 UNIT/ML
50 SYRINGE (ML) INTRAVENOUS AS NEEDED
Status: CANCELLED | OUTPATIENT
Start: 2024-02-16

## 2024-02-16 RX ORDER — FAMOTIDINE 10 MG/ML
20 INJECTION INTRAVENOUS ONCE AS NEEDED
Status: DISCONTINUED | OUTPATIENT
Start: 2024-02-16 | End: 2024-02-16 | Stop reason: HOSPADM

## 2024-02-16 RX ORDER — ALBUTEROL SULFATE 0.83 MG/ML
3 SOLUTION RESPIRATORY (INHALATION) AS NEEDED
Status: DISCONTINUED | OUTPATIENT
Start: 2024-02-16 | End: 2024-02-16 | Stop reason: HOSPADM

## 2024-02-16 RX ORDER — EPINEPHRINE 0.3 MG/.3ML
0.3 INJECTION SUBCUTANEOUS EVERY 5 MIN PRN
Status: DISCONTINUED | OUTPATIENT
Start: 2024-02-16 | End: 2024-02-16 | Stop reason: HOSPADM

## 2024-02-16 RX ADMIN — DEXAMETHASONE SODIUM PHOSPHATE 12 MG: 10 INJECTION, SOLUTION INTRAMUSCULAR; INTRAVENOUS at 10:22

## 2024-02-16 RX ADMIN — SODIUM CHLORIDE 150 MG: 9 INJECTION, SOLUTION INTRAVENOUS at 10:46

## 2024-02-16 RX ADMIN — DOXORUBICIN HYDROCHLORIDE 58 MG: 2 INJECTION, SOLUTION INTRAVENOUS at 11:33

## 2024-02-16 RX ADMIN — HEPARIN 500 UNITS: 100 SYRINGE at 13:11

## 2024-02-16 RX ADMIN — BLEOMYCIN SULFATE 22.9 UNITS: 30 POWDER, FOR SOLUTION INTRAMUSCULAR; INTRAPLEURAL; INTRAVENOUS; SUBCUTANEOUS at 11:57

## 2024-02-16 RX ADMIN — DACARBAZINE 900 MG: 10 INJECTION, POWDER, FOR SOLUTION INTRAVENOUS at 12:31

## 2024-02-16 RX ADMIN — VINBLASTINE SULFATE 13.5 MG: 1 INJECTION INTRAVENOUS at 12:11

## 2024-02-16 RX ADMIN — PALONOSETRON 250 MCG: 0.05 INJECTION, SOLUTION INTRAVENOUS at 10:14

## 2024-02-16 ASSESSMENT — COLUMBIA-SUICIDE SEVERITY RATING SCALE - C-SSRS
6. HAVE YOU EVER DONE ANYTHING, STARTED TO DO ANYTHING, OR PREPARED TO DO ANYTHING TO END YOUR LIFE?: NO
2. HAVE YOU ACTUALLY HAD ANY THOUGHTS OF KILLING YOURSELF?: NO
1. IN THE PAST MONTH, HAVE YOU WISHED YOU WERE DEAD OR WISHED YOU COULD GO TO SLEEP AND NOT WAKE UP?: NO

## 2024-02-16 ASSESSMENT — PAIN SCALES - GENERAL: PAINLEVEL: 0-NO PAIN

## 2024-02-16 NOTE — SIGNIFICANT EVENT
02/16/24 0959   Prechemo Checklist   Has the patient been in the hospital, ED, or urgent care since last date of service No   Chemo/Immuno Consent Signed Yes   Protocol/Indications Verified Yes   Confirmed to previous date/time of medication Yes   Compared to previous dose Yes   All medications are dated accurately Yes   Pregnancy Test Negative Not applicable   Parameters Met Yes   BSA/Weight-Height Verified Yes   Dose Calculations Verified Yes

## 2024-02-16 NOTE — SIGNIFICANT EVENT
0950 port assessed and accessed with 3/4in 20g rodriguez needle, positive blood return noted, flushed with normal saline, covered with transparent dressing.

## 2024-02-26 DIAGNOSIS — C81.12 NODULAR SCLEROSIS HODGKIN LYMPHOMA OF INTRATHORACIC LYMPH NODES (MULTI): Primary | ICD-10-CM

## 2024-03-01 ENCOUNTER — APPOINTMENT (OUTPATIENT)
Dept: RADIOLOGY | Facility: CLINIC | Age: 49
End: 2024-03-01
Payer: COMMERCIAL

## 2024-03-01 ENCOUNTER — HOSPITAL ENCOUNTER (OUTPATIENT)
Dept: RADIOLOGY | Facility: CLINIC | Age: 49
Discharge: HOME | End: 2024-03-01
Payer: COMMERCIAL

## 2024-03-01 DIAGNOSIS — C81.72 OTHER CLASSICAL HODGKIN LYMPHOMA OF INTRATHORACIC LYMPH NODES (MULTI): ICD-10-CM

## 2024-03-01 PROCEDURE — 78815 PET IMAGE W/CT SKULL-THIGH: CPT | Mod: PS

## 2024-03-01 PROCEDURE — 3430000001 HC RX 343 DIAGNOSTIC RADIOPHARMACEUTICALS: Performed by: INTERNAL MEDICINE

## 2024-03-01 PROCEDURE — 78815 PET IMAGE W/CT SKULL-THIGH: CPT | Mod: PET TUMOR SUBSQ TX STRATEGY | Performed by: RADIOLOGY

## 2024-03-01 PROCEDURE — A9552 F18 FDG: HCPCS | Performed by: INTERNAL MEDICINE

## 2024-03-01 RX ORDER — FLUDEOXYGLUCOSE F 18 200 MCI/ML
13.08 INJECTION, SOLUTION INTRAVENOUS
Status: COMPLETED | OUTPATIENT
Start: 2024-03-01 | End: 2024-03-01

## 2024-03-01 RX ADMIN — FLUDEOXYGLUCOSE F 18 13.08 MILLICURIE: 200 INJECTION, SOLUTION INTRAVENOUS at 08:11

## 2024-03-11 ENCOUNTER — APPOINTMENT (OUTPATIENT)
Dept: HEMATOLOGY/ONCOLOGY | Facility: CLINIC | Age: 49
End: 2024-03-11
Payer: COMMERCIAL

## 2024-03-11 ENCOUNTER — OFFICE VISIT (OUTPATIENT)
Dept: HEMATOLOGY/ONCOLOGY | Facility: CLINIC | Age: 49
End: 2024-03-11
Payer: COMMERCIAL

## 2024-03-11 ENCOUNTER — LAB (OUTPATIENT)
Dept: LAB | Facility: CLINIC | Age: 49
End: 2024-03-11
Payer: COMMERCIAL

## 2024-03-11 VITALS
WEIGHT: 241.18 LBS | SYSTOLIC BLOOD PRESSURE: 162 MMHG | HEART RATE: 75 BPM | DIASTOLIC BLOOD PRESSURE: 78 MMHG | OXYGEN SATURATION: 95 % | BODY MASS INDEX: 37.2 KG/M2 | RESPIRATION RATE: 18 BRPM | TEMPERATURE: 96.8 F

## 2024-03-11 DIAGNOSIS — C81.12 NODULAR SCLEROSIS HODGKIN LYMPHOMA OF INTRATHORACIC LYMPH NODES (MULTI): ICD-10-CM

## 2024-03-11 DIAGNOSIS — C81.72 OTHER CLASSICAL HODGKIN LYMPHOMA OF INTRATHORACIC LYMPH NODES (MULTI): ICD-10-CM

## 2024-03-11 LAB
ALBUMIN SERPL BCP-MCNC: 4.4 G/DL (ref 3.4–5)
ALP SERPL-CCNC: 63 U/L (ref 33–120)
ALT SERPL W P-5'-P-CCNC: 38 U/L (ref 10–52)
ANION GAP SERPL CALC-SCNC: 10 MMOL/L (ref 10–20)
AST SERPL W P-5'-P-CCNC: 26 U/L (ref 9–39)
BASOPHILS # BLD AUTO: 0.04 X10*3/UL (ref 0–0.1)
BASOPHILS NFR BLD AUTO: 0.4 %
BILIRUB SERPL-MCNC: 0.3 MG/DL (ref 0–1.2)
BUN SERPL-MCNC: 19 MG/DL (ref 6–23)
CALCIUM SERPL-MCNC: 8.9 MG/DL (ref 8.6–10.3)
CHLORIDE SERPL-SCNC: 107 MMOL/L (ref 98–107)
CO2 SERPL-SCNC: 25 MMOL/L (ref 21–32)
CREAT SERPL-MCNC: 0.96 MG/DL (ref 0.5–1.3)
EGFRCR SERPLBLD CKD-EPI 2021: >90 ML/MIN/1.73M*2
EOSINOPHIL # BLD AUTO: 0.25 X10*3/UL (ref 0–0.7)
EOSINOPHIL NFR BLD AUTO: 2.8 %
ERYTHROCYTE [DISTWIDTH] IN BLOOD BY AUTOMATED COUNT: 13.7 % (ref 11.5–14.5)
GLUCOSE SERPL-MCNC: 106 MG/DL (ref 74–99)
HCT VFR BLD AUTO: 38.3 % (ref 41–52)
HGB BLD-MCNC: 13.1 G/DL (ref 13.5–17.5)
IMM GRANULOCYTES # BLD AUTO: 0.14 X10*3/UL (ref 0–0.7)
IMM GRANULOCYTES NFR BLD AUTO: 1.5 % (ref 0–0.9)
IRON SATN MFR SERPL: 37 % (ref 25–45)
IRON SERPL-MCNC: 133 UG/DL (ref 35–150)
LYMPHOCYTES # BLD AUTO: 1.57 X10*3/UL (ref 1.2–4.8)
LYMPHOCYTES NFR BLD AUTO: 17.3 %
MCH RBC QN AUTO: 29.8 PG (ref 26–34)
MCHC RBC AUTO-ENTMCNC: 34.2 G/DL (ref 32–36)
MCV RBC AUTO: 87 FL (ref 80–100)
MONOCYTES # BLD AUTO: 1.17 X10*3/UL (ref 0.1–1)
MONOCYTES NFR BLD AUTO: 12.9 %
NEUTROPHILS # BLD AUTO: 5.92 X10*3/UL (ref 1.2–7.7)
NEUTROPHILS NFR BLD AUTO: 65.1 %
NRBC BLD-RTO: 0 /100 WBCS (ref 0–0)
PLATELET # BLD AUTO: 392 X10*3/UL (ref 150–450)
POTASSIUM SERPL-SCNC: 4.2 MMOL/L (ref 3.5–5.3)
PROT SERPL-MCNC: 6.8 G/DL (ref 6.4–8.2)
RBC # BLD AUTO: 4.4 X10*6/UL (ref 4.5–5.9)
SODIUM SERPL-SCNC: 138 MMOL/L (ref 136–145)
TIBC SERPL-MCNC: 356 UG/DL (ref 240–445)
UIBC SERPL-MCNC: 223 UG/DL (ref 110–370)
WBC # BLD AUTO: 9.1 X10*3/UL (ref 4.4–11.3)

## 2024-03-11 PROCEDURE — 83540 ASSAY OF IRON: CPT

## 2024-03-11 PROCEDURE — 85025 COMPLETE CBC W/AUTO DIFF WBC: CPT

## 2024-03-11 PROCEDURE — 99215 OFFICE O/P EST HI 40 MIN: CPT | Performed by: INTERNAL MEDICINE

## 2024-03-11 PROCEDURE — 80053 COMPREHEN METABOLIC PANEL: CPT

## 2024-03-11 PROCEDURE — 1036F TOBACCO NON-USER: CPT | Performed by: INTERNAL MEDICINE

## 2024-03-11 PROCEDURE — 3077F SYST BP >= 140 MM HG: CPT | Performed by: INTERNAL MEDICINE

## 2024-03-11 PROCEDURE — 3078F DIAST BP <80 MM HG: CPT | Performed by: INTERNAL MEDICINE

## 2024-03-11 ASSESSMENT — PAIN SCALES - GENERAL: PAINLEVEL: 0-NO PAIN

## 2024-03-11 NOTE — PROGRESS NOTES
Patient ID: Edward Wolfe is a 48 y.o. male.  Referring Physician: Abelino Flores MD  [address] [facility]  [place] [address]  Primary Care Provider: Nellie Harrison DO  Cancer history:    Classical Hodgkin's lymphoma involving right paratracheal lymph node.  PET scan positive in mediastinum as well as left supraglottic.  Stage Iia  A PET scan after 2 cycles of ABVD revealed excellent response Deauville score of 2.  The patient referred to radiation oncology on March 11, 2024.    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  CT scan of chest revealed interval increase in the size of an irregular mass in the anterior mediastinum with associated calcification when compared to prior CT scan examination on August 11, 2021.  Prominent mediastinal lymphadenopathy is also noted which is increased compared to prior examination in 2021.  Findings are suspicious.  An EBUS by  on November 7, 2023 revealed anterior mediastinal paratracheal lymph node mass.  Biopsy consistent with classic Hodgkin's lymphoma.  A PET scan on September 18, 2023 revealedIMPRESSION:  1. Hypermetabolic anterior mediastinal mass which is concerning for  neoplasm. Further evaluation with soft tissue biopsy can be  considered.  2. Hypermetabolic mediastinal and right supraclavicular lymph nodes  which are concerning for metastatic lymphadenopathy.  3. No evidence of distal hypermetabolic lesions concerning for  metastases. the patient was referred for further evaluation and management.    At interview on November 22, 2023 the patient and his wife narrated entire history.  The patient is asymptomatic.  Denied a history of weight loss, fevers, night sweats, chest pain, shortness of breath, nausea, vomiting, hematemesis, melena, hematochezia and  hematuria.    The patient and his wife had come for follow-up on December 26, 2023 regarding history of stage IIa classical Hodgkin's lymphoma involving left supraclavicular and right mediastinal lymph nodes.  The patient is asymptomatic.  [id number] was placed, color Doppler echocardiogram and pulmonary function tests were done as well.  The patient is asymptomatic.    The patient and wife had come for follow-up January 31, 2024 regarding history of stage IIa classical Hodgkin's lymphoma involving left supraclavicular and right mediastinal lymph nodes.  The patient was placed on a chemotherapy regimen using ABVD.  He has received and tolerated cycle 1 day 1 and 14 without any problems.    The patient and his wife had come for follow-up on March 11, 2024 regarding history of stage IIa classical Hodgkin's lymphoma involving left supraclavicular and right mediastinal lymph nodes.  The patient received 2 cycles of chemotherapy using ABVD.  A PET scan revealed excellent response with Deauville score of 2.  The patient is feeling much better.    Past medical history:    Hypertension, hypercholesterolemia.    Past surgical history:    Right shoulder and right hip surgery.    Colonoscopy:    September 2023.    Family history:    Reviewed but not relevant.    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    Review of Systems   All other systems reviewed and are negative.       Objective   BSA: 2.28 meters squared  /78   Pulse 75   Temp 36 °C (96.8 °F)   Resp 18   Wt 109 kg (241 lb 2.9 oz)   SpO2 95%   BMI 37.20 kg/m²     No family history on file.  Oncology History   Hodgkin lymphoma of intrathoracic lymph nodes (CMS/HCC)   11/22/2023 Initial Diagnosis    Hodgkin lymphoma of intrathoracic lymph nodes (CMS/HCC)     1/5/2024 -  Chemotherapy    ABVD (DOXOrubicin / Bleomycin / VinBLAStine /  Dacarbazine), 28 Day Cycles         Edward Wolfe  reports that he has never smoked. He has never been exposed to tobacco smoke. He has never used smokeless tobacco.  He  reports current alcohol use.  He  reports no history of drug use.    Physical Exam  Constitutional:       Appearance: Normal appearance.   HENT:      Head: Normocephalic and atraumatic.      Nose: Nose normal.      Mouth/Throat:      Mouth: Mucous membranes are moist.      Pharynx: Oropharynx is clear.   Eyes:      Extraocular Movements: Extraocular movements intact.      Conjunctiva/sclera: Conjunctivae normal.      Pupils: Pupils are equal, round, and reactive to light.   Cardiovascular:      Rate and Rhythm: Normal rate and regular rhythm.   Pulmonary:      Effort: Pulmonary effort is normal.      Breath sounds: Normal breath sounds.   Abdominal:      General: Abdomen is flat. Bowel sounds are normal.      Palpations: Abdomen is soft.   Musculoskeletal:         General: Normal range of motion.      Cervical back: Normal range of motion and neck supple.   Neurological:      General: No focal deficit present.      Mental Status: He is alert and oriented to person, place, and time. Mental status is at baseline.   Psychiatric:         Mood and Affect: Mood normal.         Behavior: Behavior normal.         Thought Content: Thought content normal.         Judgment: Judgment normal.         Performance Status:  Asymptomatic    Assessment/Plan    The patient is a 48-year-old man with past medical history of hypertension, hypercholesterolemia and a newly diagnosed classical Hodgkin's lymphoma involving mediastinum as well as possible right supraclavicular lymph node.  Physical examination was within normal limits.  There was no palpable cervical axillary inguinal lymphadenopathy.  I had a detailed discussion with the patient explained to him that it would be prudent to obtain more staging information such as bone marrow aspiration biopsy and also  consider color Doppler echocardiogram, pulmonary function test as well as a port placement.  The patient and his wife understood appreciated all the details provided and were grateful.    Patient and his wife had come for follow-up on December 26, 2023 regarding history of classical Hodgkin's lymphoma involving left supra clavicular and mediastinal/hilar lymph nodes, stage IIa.  Color Doppler echocardiogram revealed ejection fraction 65% pulmonary function tests in reference range, [id number] was placed.  And a detailed discussion with the patient explained to him that if agreeable would recommend chemotherapy using Adriamycin, bleomycin, vinblastine, dacarbazine every 2 weeks x 4 cycles followed by PET scan and then radiation oncology evaluation.  Other option to do nothing.  After thinking through all the options the patient is agreeable to start chemotherapy.  Effect side effects explained.  Printed material from [employer] provided.  Informed consent obtained.  The patient to start chemotherapy first week of January 2024.  The patient and wife had come for follow-up January 31, 2024 regarding history of stage IIa classical Hodgkin's lymphoma involving left supraclavicular and right mediastinal lymph nodes.  The patient was placed on a chemotherapy regimen using ABVD.  He has received and tolerated cycle 1 day 1 and 14 without any problems.  The patient to receive cycle 2 day 1 on February 2, 2024.  I have recommended restaging PET scan after completion of cycle 2 and return in 4 weeks.    The patient and his wife had come for follow-up on March 11, 2024 regarding history of stage IIa classical Hodgkin's lymphoma involving left supraclavicular and right mediastinal lymph nodes.  The patient received 2 cycles of chemotherapy using ABVD.  A PET scan revealed excellent response with Deauville score of 2.  The patient is feeling much better.  Referred to radiation oncology for opinion.    Thank you for allowing me to  participate in the care of your patient if you have any questions please feel free to call me      Diagnoses and all orders for this visit:  Hodgkin lymphoma of intrathoracic lymph nodes, unspecified Hodgkin lymphoma type (CMS/HCC)  -     Bone Marrow Evaluation  -     Onco-Echo Complete (Strain And 3D); Future  -     Pulmonary function testing; Future  Lymph node enlargement  -     Referral to Malignant Hematology (Hematology / Oncology)  -     Bone Marrow Evaluation  -     Onco-Echo Complete (Strain And 3D); Future  -     Pulmonary function testing; Future  Other classical Hodgkin lymphoma of lymph nodes of multiple regions (CMS/HCC)  -     Bone Marrow Evaluation  -     Onco-Echo Complete (Strain And 3D); Future  -     Pulmonary function testing; Future           Abelino Flores MD

## 2024-03-19 ENCOUNTER — HOSPITAL ENCOUNTER (OUTPATIENT)
Dept: RADIATION ONCOLOGY | Facility: CLINIC | Age: 49
Setting detail: RADIATION/ONCOLOGY SERIES
Discharge: HOME | End: 2024-03-19
Payer: COMMERCIAL

## 2024-03-19 VITALS
HEART RATE: 69 BPM | RESPIRATION RATE: 18 BRPM | TEMPERATURE: 97.9 F | OXYGEN SATURATION: 97 % | SYSTOLIC BLOOD PRESSURE: 163 MMHG | WEIGHT: 239 LBS | DIASTOLIC BLOOD PRESSURE: 91 MMHG | BODY MASS INDEX: 36.86 KG/M2

## 2024-03-19 DIAGNOSIS — C81.72 OTHER CLASSICAL HODGKIN LYMPHOMA OF INTRATHORACIC LYMPH NODES (MULTI): Primary | ICD-10-CM

## 2024-03-19 PROCEDURE — 99205 OFFICE O/P NEW HI 60 MIN: CPT | Performed by: STUDENT IN AN ORGANIZED HEALTH CARE EDUCATION/TRAINING PROGRAM

## 2024-03-19 PROCEDURE — 99215 OFFICE O/P EST HI 40 MIN: CPT | Performed by: STUDENT IN AN ORGANIZED HEALTH CARE EDUCATION/TRAINING PROGRAM

## 2024-03-19 ASSESSMENT — ENCOUNTER SYMPTOMS
HEMATOLOGIC/LYMPHATIC NEGATIVE: 1
RESPIRATORY NEGATIVE: 1
PSYCHIATRIC NEGATIVE: 1
CARDIOVASCULAR NEGATIVE: 1
ARTHRALGIAS: 1
NEUROLOGICAL NEGATIVE: 1
CONSTITUTIONAL NEGATIVE: 1
GASTROINTESTINAL NEGATIVE: 1
ENDOCRINE NEGATIVE: 1
EYES NEGATIVE: 1

## 2024-03-19 ASSESSMENT — PATIENT HEALTH QUESTIONNAIRE - PHQ9
2. FEELING DOWN, DEPRESSED OR HOPELESS: NOT AT ALL
1. LITTLE INTEREST OR PLEASURE IN DOING THINGS: NOT AT ALL
SUM OF ALL RESPONSES TO PHQ9 QUESTIONS 1 AND 2: 0

## 2024-03-19 ASSESSMENT — PAIN SCALES - GENERAL: PAINLEVEL: 0-NO PAIN

## 2024-03-19 NOTE — PROGRESS NOTES
Radiation Oncology Outpatient Consult    Patient Name:  Edward Wolfe  MRN:  [id number]  :  1975    Referring Provider: Abelino Flores MD  Primary Care Provider: Nellie Harrison DO  Care Team: Patient Care Team:  Nellie Harrison DO as PCP - General  Nellie Harrison DO as PCP - [facility] PCP  James Rader MD as Consulting Physician (Hematology and Oncology)  Abelino Flores MD as Consulting Physician (Hematology and Oncology)    Date of Service: 3/19/2024     SUBJECTIVE  History of Present Illness:  Edward Wolfe is a 48 y.o. male with a diagnosis of Hodgkin lymphoma referred by Abelino Flores for evaluation and management.  He presented for a coronary calcium score in  which showed focal nodular infiltration of the right upper lobe which could be infectious/inflammatory, recommended for repeat CT follow-up.  He underwent repeat CT of the chest without contrast on 2023 which showed interval increase in size of an irregular mass in the anterior mediastinum with associated calcifications, with prominent mediastinal lymphadenopathy, suspicious for malignancy.  He underwent a PET/CT on 2023 which showed FDG uptake in an anterior mediastinal mass anterolaterally to the root of the aorta, hypermetabolic right paratracheal lymphadenopathy, and right supraclavicular lymphadenopathy. U/s guided biopsy of the right supraclavicular lymph node showed highly atypical epithelioid cells, nondiagnostic.  He was recommended an EBUS which was done on 10/20/2023, showing highly atypical cells present in 10 R and 4R, however also nondiagnostic.  He underwent mediastinoscopy on 2023, where biopsies of the right paratracheal lymph nodes showed findings most consistent with classic Hodgkin lymphoma, CD15+ and CD 30+. He met with Dr. Abelino Flores and was recommended ABVD x2, this was completed 24. Repeat PET/CT showed a Deauville 2 response with improvement in the mediastinal disease and complete  resolution of right SCV disease.   He presents with his wife.  He stated he tolerated his chemotherapy course with stomach ache and fatigue which has since improved.  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  He denies shortness of breath, cough, chest pains.  He has no prior history of malignancy.    Prior Radiotherapy:  No    Current Systemic Treatment:  No     Presence of Pacemaker or ICD:  No    Past Medical History:    Past Medical History:   Diagnosis Date    Anxiety     Arthritis     Contusion of right hand, initial encounter 04/07/2018    Contusion of right hand, initial encounter    Disruption of wound, unspecified, initial encounter 01/02/2017    Dehiscence of wound of skin    GERD (gastroesophageal reflux disease)     Hyperlipidemia     Hypertension     Laceration without foreign body of left hand, initial encounter 01/02/2017    Laceration of hand, left    Laceration without foreign body of left hand, initial encounter 01/02/2017    Laceration of hand, left    Left testicular pain 11/06/2017    Testicular pain, left    Lymphoma (CMS/HCC)     Other abnormal findings in urine     Leukocytes in urine    Other injury of unspecified body region, initial encounter 01/02/2017    Infected laceration of skin    Other specified soft tissue disorders 04/20/2017    Soft tissue mass    Pain in right hand 04/07/2018    Hand pain, right    Personal history of diseases of the skin and subcutaneous tissue 07/23/2019    History of folliculitis    Personal history of diseases of the skin and subcutaneous tissue 07/23/2019    History of folliculitis    Personal history of other diseases of the circulatory system 07/25/2019    History of lymphadenitis    Personal history of other mental and behavioral disorders 08/18/2014    History of depression    Personal history of other specified conditions 11/06/2017    History of abdominal pain    Sleep apnea     Vision loss         Past  Surgical History:    Past Surgical History:   Procedure Laterality Date    APPENDECTOMY  08/18/2014    Appendectomy    HIP ARTHROPLASTY      IR BIOPSY BONE MARROW Right 12/11/2023    IR BIOPSY BONE MARROW 12/11/2023 Blanca Haas MD [facility]    IR CVC PORT PLACEMENT  12/13/2023    IR CVC PORT PLACEMENT 12/13/2023 Blanca Haas MD PAR [facility]    JOINT REPLACEMENT      MOUTH SURGERY  01/11/2016    Oral Surgery Tooth Extraction    OTHER SURGICAL HISTORY  01/22/2020    Rotator cuff repair    OTHER SURGICAL HISTORY  02/18/2021    Hip surgery    ROTATOR CUFF REPAIR      SHOULDER SURGERY  08/18/2014    Shoulder Surgery    US GUIDED BIOPSY LYMPH NODE SUPERFICIAL  10/09/2023    US GUIDED BIOPSY LYMPH NODE SUPERFICIAL 10/9/2023 PAR US        Family History:  Cancer-related family history includes Lung cancer in his father.    Social History:    Social History     Tobacco Use    Smoking status: Never     Passive exposure: Never    Smokeless tobacco: Never   Vaping Use    Vaping Use: Never used   Substance Use Topics    Alcohol use: Yes     Comment: social    Drug use: Never       Allergies:  No Known Allergies     Medications:    Current Outpatient Medications:     atorvastatin (Lipitor) 40 mg tablet, TAKE 1 TABLET BY MOUTH EVERY DAY AS DIRECTED, Disp: 90 tablet, Rfl: 1    gemfibrozil (Lopid) 600 mg tablet, TAKE 1 TABLET BY MOUTH EVERY DAY, Disp: 90 tablet, Rfl: 1    ibuprofen 200 mg tablet, Take 1 tablet (200 mg) by mouth every 8 hours if needed for mild pain (1 - 3)., Disp: , Rfl:     lisinopril 10 mg tablet, TAKE 1 TABLET BY MOUTH EVERY DAY, Disp: 90 tablet, Rfl: 1    meloxicam (Mobic) 7.5 mg tablet, Take 1 tablet (7.5 mg) by mouth once daily., Disp: , Rfl:     omeprazole (PriLOSEC) 20 mg DR capsule, Take 1 capsule (20 mg) by mouth. Do not crush or chew., Disp: , Rfl:     sertraline (Zoloft) 100 mg tablet, Take 1.5 tablets (150 mg) by mouth once daily., Disp: 135 tablet, Rfl: 1  No current  facility-administered medications for this encounter.    Facility-Administered Medications Ordered in Other Encounters:     lidocaine (cardiac) (Xylocaine) injection  - Omnicell Override Pull, , , ,     midazolam (Versed) injection  - Omnicell Override Pull, , , ,     rocuronium (ZeMuron) injection  - Omnicell Override Pull, , , ,     sevoflurane inhaler solution  - Omnicell Override Pull, , , ,     sugammadex (Bridion) injection  - Omnicell Override Pull, , , ,       Review of Systems:  Review of Systems   Constitutional: Negative.    HENT:  Negative.     Eyes: Negative.    Respiratory: Negative.     Cardiovascular: Negative.    Gastrointestinal: Negative.    Endocrine: Negative.    Genitourinary: Negative.     Musculoskeletal:  Positive for arthralgias.   Skin: Negative.    Neurological: Negative.    Hematological: Negative.    Psychiatric/Behavioral: Negative.       The patient's current pain level was assessed.  They report currently having a pain of 0 out of 10.  They feel their pain is under control without the use of pain medications.    Performance Status:  The Karnofsky performance scale today is 90, Able to carry on normal activity; minor signs or symptoms of disease (ECOG equivalent 0).        OBJECTIVE  Physical Exam:  BP (!) 163/91   Pulse 69   Temp 36.6 °C (97.9 °F)   Resp 18   Wt 108 kg (239 lb)   SpO2 97%   BMI 36.86 kg/m²    Physical Exam     Laboratory Review:  There are no laboratory contraindications to radiation therapy.    The pertinent lab results were reviewed.    Pathology Review:  The pertinent pathology results were reviewed and discussed with the patient.    Imaging:  The pertinent imaging results were reviewed and discussed with the patient.         ASSESSMENT:  Edward Wolfe is a 48 y.o. male with Stage IIA classic Hodgkin lymphoma of the mediastinum and right SCV, ESR not known at initial dx but otherwise favorable classification, s/p ABVD x2 with a Deauville 2  response.    PLAN:    I discussed the role of involved site radiotherapy in the treatment of early-stage Hodgkin lymphoma.   He has shown an excellent response to upfront chemotherapy. ISRT to 20 Gy in 10 fractions is  recommended to improve his freedom from treatment progression and PFS.  We reviewed treatment planning logistics including CT simulation.  We also reviewed acute side effects of radiation treatment to this region.  We discussed late risks including but not limited to risk of pneumonitis, lung fibrosis, cardiac toxicity, and secondary malignancy.  I plan to use deep inspiration breath-hold for cardiac sparing. He stated his understanding and wishes to proceed.  Informed consent was signed.  CT simulation will be scheduled.    NCCN Guidelines were applicable to guide this patients treatment plan.     Thank you for allowing me to participate in this patient's care.    Dayton Canada MD  Department of Radiation Oncology  [facility]    Portions of this note were generated using voice recognition software, and may be subject to transcription errors.

## 2024-03-22 ENCOUNTER — HOSPITAL ENCOUNTER (OUTPATIENT)
Dept: RADIOLOGY | Facility: CLINIC | Age: 49
Discharge: HOME | End: 2024-03-22
Payer: COMMERCIAL

## 2024-03-22 DIAGNOSIS — C81.72 OTHER CLASSICAL HODGKIN LYMPHOMA OF INTRATHORACIC LYMPH NODES (MULTI): ICD-10-CM

## 2024-03-22 PROCEDURE — 77334 RADIATION TREATMENT AID(S): CPT | Performed by: STUDENT IN AN ORGANIZED HEALTH CARE EDUCATION/TRAINING PROGRAM

## 2024-03-22 PROCEDURE — 77263 THER RADIOLOGY TX PLNG CPLX: CPT | Performed by: STUDENT IN AN ORGANIZED HEALTH CARE EDUCATION/TRAINING PROGRAM

## 2024-03-29 ENCOUNTER — HOSPITAL ENCOUNTER (OUTPATIENT)
Dept: RADIATION ONCOLOGY | Facility: CLINIC | Age: 49
Setting detail: RADIATION/ONCOLOGY SERIES
Discharge: HOME | End: 2024-03-29
Payer: COMMERCIAL

## 2024-03-29 PROCEDURE — 77301 RADIOTHERAPY DOSE PLAN IMRT: CPT | Performed by: STUDENT IN AN ORGANIZED HEALTH CARE EDUCATION/TRAINING PROGRAM

## 2024-03-29 PROCEDURE — 77293 RESPIRATOR MOTION MGMT SIMUL: CPT | Performed by: STUDENT IN AN ORGANIZED HEALTH CARE EDUCATION/TRAINING PROGRAM

## 2024-03-29 PROCEDURE — 77300 RADIATION THERAPY DOSE PLAN: CPT | Performed by: STUDENT IN AN ORGANIZED HEALTH CARE EDUCATION/TRAINING PROGRAM

## 2024-03-29 PROCEDURE — 77338 DESIGN MLC DEVICE FOR IMRT: CPT | Performed by: STUDENT IN AN ORGANIZED HEALTH CARE EDUCATION/TRAINING PROGRAM

## 2024-04-03 ENCOUNTER — HOSPITAL ENCOUNTER (OUTPATIENT)
Dept: RADIATION ONCOLOGY | Facility: CLINIC | Age: 49
Setting detail: RADIATION/ONCOLOGY SERIES
Discharge: HOME | End: 2024-04-03
Payer: COMMERCIAL

## 2024-04-03 DIAGNOSIS — C81.72: ICD-10-CM

## 2024-04-03 LAB
RAD ONC MSQ ACTUAL FRACTIONS DELIVERED: 1
RAD ONC MSQ ACTUAL SESSION DELIVERED DOSE: 200 CGRAY
RAD ONC MSQ ACTUAL TOTAL DOSE: 200 CGRAY
RAD ONC MSQ ELAPSED DAYS: 0
RAD ONC MSQ LAST DATE: NORMAL
RAD ONC MSQ PRESCRIBED FRACTIONAL DOSE: 200 CGRAY
RAD ONC MSQ PRESCRIBED NUMBER OF FRACTIONS: 10
RAD ONC MSQ PRESCRIBED TECHNIQUE: NORMAL
RAD ONC MSQ PRESCRIBED TOTAL DOSE: 2000 CGRAY
RAD ONC MSQ PRESCRIPTION PATTERN COMMENT: NORMAL
RAD ONC MSQ START DATE: NORMAL
RAD ONC MSQ TREATMENT COURSE NUMBER: 1
RAD ONC MSQ TREATMENT SITE: NORMAL

## 2024-04-03 PROCEDURE — 77386 HC INTENSITY-MODULATED RADIATION THERAPY (IMRT), COMPLEX: CPT | Performed by: STUDENT IN AN ORGANIZED HEALTH CARE EDUCATION/TRAINING PROGRAM

## 2024-04-03 PROCEDURE — 77014 CHG CT GUIDANCE RADIATION THERAPY FLDS PLACEMENT: CPT | Performed by: STUDENT IN AN ORGANIZED HEALTH CARE EDUCATION/TRAINING PROGRAM

## 2024-04-04 ENCOUNTER — HOSPITAL ENCOUNTER (OUTPATIENT)
Dept: RADIATION ONCOLOGY | Facility: CLINIC | Age: 49
Setting detail: RADIATION/ONCOLOGY SERIES
Discharge: HOME | End: 2024-04-04
Payer: COMMERCIAL

## 2024-04-04 ENCOUNTER — RADIATION ONCOLOGY OTV (OUTPATIENT)
Dept: RADIATION ONCOLOGY | Facility: CLINIC | Age: 49
End: 2024-04-04
Payer: COMMERCIAL

## 2024-04-04 VITALS
DIASTOLIC BLOOD PRESSURE: 92 MMHG | OXYGEN SATURATION: 95 % | HEART RATE: 73 BPM | RESPIRATION RATE: 18 BRPM | BODY MASS INDEX: 37.41 KG/M2 | TEMPERATURE: 96.8 F | WEIGHT: 242.6 LBS | SYSTOLIC BLOOD PRESSURE: 149 MMHG

## 2024-04-04 DIAGNOSIS — C81.72: ICD-10-CM

## 2024-04-04 DIAGNOSIS — Z51.0 ENCOUNTER FOR ANTINEOPLASTIC RADIATION THERAPY: ICD-10-CM

## 2024-04-04 LAB
RAD ONC MSQ ACTUAL FRACTIONS DELIVERED: 2
RAD ONC MSQ ACTUAL SESSION DELIVERED DOSE: 200 CGRAY
RAD ONC MSQ ACTUAL TOTAL DOSE: 400 CGRAY
RAD ONC MSQ ELAPSED DAYS: 1
RAD ONC MSQ LAST DATE: NORMAL
RAD ONC MSQ PRESCRIBED FRACTIONAL DOSE: 200 CGRAY
RAD ONC MSQ PRESCRIBED NUMBER OF FRACTIONS: 10
RAD ONC MSQ PRESCRIBED TECHNIQUE: NORMAL
RAD ONC MSQ PRESCRIBED TOTAL DOSE: 2000 CGRAY
RAD ONC MSQ PRESCRIPTION PATTERN COMMENT: NORMAL
RAD ONC MSQ START DATE: NORMAL
RAD ONC MSQ TREATMENT COURSE NUMBER: 1
RAD ONC MSQ TREATMENT SITE: NORMAL

## 2024-04-04 PROCEDURE — 77427 RADIATION TX MANAGEMENT X5: CPT | Performed by: STUDENT IN AN ORGANIZED HEALTH CARE EDUCATION/TRAINING PROGRAM

## 2024-04-04 PROCEDURE — 77014 CHG CT GUIDANCE RADIATION THERAPY FLDS PLACEMENT: CPT | Performed by: STUDENT IN AN ORGANIZED HEALTH CARE EDUCATION/TRAINING PROGRAM

## 2024-04-04 PROCEDURE — 77386 HC INTENSITY-MODULATED RADIATION THERAPY (IMRT), COMPLEX: CPT | Performed by: STUDENT IN AN ORGANIZED HEALTH CARE EDUCATION/TRAINING PROGRAM

## 2024-04-04 ASSESSMENT — PAIN SCALES - GENERAL: PAINLEVEL: 0-NO PAIN

## 2024-04-04 NOTE — PROGRESS NOTES
Radiation Oncology On Treatment Visit    Patient Name:  Edward Wolfe  MRN:  [id number]  :  1975    Referring Provider: No ref. provider found  Primary Care Provider: Nellie Harrison DO  Care Team: Patient Care Team:  Nellie Harrison DO as PCP - General  Nellie Harrison DO as PCP - [facility] PCP  James Rader MD as Consulting Physician (Hematology and Oncology)  Abelino Flores MD as Consulting Physician (Hematology and Oncology)    Date of Service: 2024     Diagnosis:   Specialty Problems          Radiation Oncology Problems    Hodgkin lymphoma of intrathoracic lymph nodes (CMS/HCC)        Hodgkin lymphoma of lymph nodes of multiple regions (CMS/HCC)        Other classical Hodgkin lymphoma of intrathoracic lymph nodes (CMS/HCC)         Treatment Summary:  Radiation Treatments       Active   RtSCVThorax (Started on 4/3/2024)   Most recent fraction: 200 cGy given on 2024   Total given: 400 cGy / 2,000 cGy  (2 of 10 fractions)   Elapsed Days: 1   Technique: IMRT           Completed   No historical radiation treatments to show.             SUBJECTIVE:   Tolerating first week of treatment.  He denies any changes in his overall health.  No fatigue, cough, shortness of breath, chest or neck pains.      OBJECTIVE:   Vital Signs:  BP (!) 149/92   Pulse 73   Temp 36 °C (96.8 °F)   Resp 18   Wt 110 kg (242 lb 9.6 oz)   SpO2 95%   BMI 37.41 kg/m²    Pain Scale: The patient's current pain level was assessed.  They report currently having a pain of 0 out of 10.    Other Pertinent Findings:     No evidence of dermatitis over the neck  Toxicity Assessment          2024    15:44   Toxicity Assessment   Adverse Events Reviewed (WDL) No (Exceptions to WDL)   Treatment Site Thoracic        Assessment / Plan:  The patient is tolerating radiation therapy as anticipated.  Continue per current treatment plan.       Thank you for allowing me to participate in this patient's care.    Dayton Canada,  MD  Department of Radiation Oncology  [facility]    Portions of this note were generated using voice recognition software, and may be subject to transcription errors.

## 2024-04-05 ENCOUNTER — HOSPITAL ENCOUNTER (OUTPATIENT)
Dept: RADIATION ONCOLOGY | Facility: CLINIC | Age: 49
Setting detail: RADIATION/ONCOLOGY SERIES
Discharge: HOME | End: 2024-04-05
Payer: COMMERCIAL

## 2024-04-05 DIAGNOSIS — C81.72: ICD-10-CM

## 2024-04-05 DIAGNOSIS — Z51.0 ENCOUNTER FOR ANTINEOPLASTIC RADIATION THERAPY: ICD-10-CM

## 2024-04-05 LAB
RAD ONC MSQ ACTUAL FRACTIONS DELIVERED: 3
RAD ONC MSQ ACTUAL SESSION DELIVERED DOSE: 200 CGRAY
RAD ONC MSQ ACTUAL TOTAL DOSE: 600 CGRAY
RAD ONC MSQ ELAPSED DAYS: 2
RAD ONC MSQ LAST DATE: NORMAL
RAD ONC MSQ PRESCRIBED FRACTIONAL DOSE: 200 CGRAY
RAD ONC MSQ PRESCRIBED NUMBER OF FRACTIONS: 10
RAD ONC MSQ PRESCRIBED TECHNIQUE: NORMAL
RAD ONC MSQ PRESCRIBED TOTAL DOSE: 2000 CGRAY
RAD ONC MSQ PRESCRIPTION PATTERN COMMENT: NORMAL
RAD ONC MSQ START DATE: NORMAL
RAD ONC MSQ TREATMENT COURSE NUMBER: 1
RAD ONC MSQ TREATMENT SITE: NORMAL

## 2024-04-05 PROCEDURE — 77014 CHG CT GUIDANCE RADIATION THERAPY FLDS PLACEMENT: CPT | Performed by: STUDENT IN AN ORGANIZED HEALTH CARE EDUCATION/TRAINING PROGRAM

## 2024-04-05 PROCEDURE — 77386 HC INTENSITY-MODULATED RADIATION THERAPY (IMRT), COMPLEX: CPT | Performed by: STUDENT IN AN ORGANIZED HEALTH CARE EDUCATION/TRAINING PROGRAM

## 2024-04-08 ENCOUNTER — HOSPITAL ENCOUNTER (OUTPATIENT)
Dept: RADIATION ONCOLOGY | Facility: CLINIC | Age: 49
Setting detail: RADIATION/ONCOLOGY SERIES
Discharge: HOME | End: 2024-04-08
Payer: COMMERCIAL

## 2024-04-08 DIAGNOSIS — C81.72: ICD-10-CM

## 2024-04-08 DIAGNOSIS — Z51.0 ENCOUNTER FOR ANTINEOPLASTIC RADIATION THERAPY: ICD-10-CM

## 2024-04-08 LAB
RAD ONC MSQ ACTUAL FRACTIONS DELIVERED: 4
RAD ONC MSQ ACTUAL SESSION DELIVERED DOSE: 200 CGRAY
RAD ONC MSQ ACTUAL TOTAL DOSE: 800 CGRAY
RAD ONC MSQ ELAPSED DAYS: 5
RAD ONC MSQ LAST DATE: NORMAL
RAD ONC MSQ PRESCRIBED FRACTIONAL DOSE: 200 CGRAY
RAD ONC MSQ PRESCRIBED NUMBER OF FRACTIONS: 10
RAD ONC MSQ PRESCRIBED TECHNIQUE: NORMAL
RAD ONC MSQ PRESCRIBED TOTAL DOSE: 2000 CGRAY
RAD ONC MSQ PRESCRIPTION PATTERN COMMENT: NORMAL
RAD ONC MSQ START DATE: NORMAL
RAD ONC MSQ TREATMENT COURSE NUMBER: 1
RAD ONC MSQ TREATMENT SITE: NORMAL

## 2024-04-08 PROCEDURE — 77014 CHG CT GUIDANCE RADIATION THERAPY FLDS PLACEMENT: CPT | Performed by: STUDENT IN AN ORGANIZED HEALTH CARE EDUCATION/TRAINING PROGRAM

## 2024-04-08 PROCEDURE — 77336 RADIATION PHYSICS CONSULT: CPT | Performed by: STUDENT IN AN ORGANIZED HEALTH CARE EDUCATION/TRAINING PROGRAM

## 2024-04-08 PROCEDURE — 77386 HC INTENSITY-MODULATED RADIATION THERAPY (IMRT), COMPLEX: CPT | Performed by: STUDENT IN AN ORGANIZED HEALTH CARE EDUCATION/TRAINING PROGRAM

## 2024-04-09 ENCOUNTER — HOSPITAL ENCOUNTER (OUTPATIENT)
Dept: RADIATION ONCOLOGY | Facility: CLINIC | Age: 49
Setting detail: RADIATION/ONCOLOGY SERIES
Discharge: HOME | End: 2024-04-09
Payer: COMMERCIAL

## 2024-04-09 PROCEDURE — 77014 CHG CT GUIDANCE RADIATION THERAPY FLDS PLACEMENT: CPT | Performed by: STUDENT IN AN ORGANIZED HEALTH CARE EDUCATION/TRAINING PROGRAM

## 2024-04-09 PROCEDURE — 77386 HC INTENSITY-MODULATED RADIATION THERAPY (IMRT), COMPLEX: CPT | Performed by: STUDENT IN AN ORGANIZED HEALTH CARE EDUCATION/TRAINING PROGRAM

## 2024-04-10 ENCOUNTER — HOSPITAL ENCOUNTER (OUTPATIENT)
Dept: RADIATION ONCOLOGY | Facility: CLINIC | Age: 49
Setting detail: RADIATION/ONCOLOGY SERIES
Discharge: HOME | End: 2024-04-10
Payer: COMMERCIAL

## 2024-04-10 DIAGNOSIS — Z51.0 ENCOUNTER FOR ANTINEOPLASTIC RADIATION THERAPY: ICD-10-CM

## 2024-04-10 DIAGNOSIS — C81.72: ICD-10-CM

## 2024-04-10 LAB
RAD ONC MSQ ACTUAL FRACTIONS DELIVERED: 6
RAD ONC MSQ ACTUAL SESSION DELIVERED DOSE: 200 CGRAY
RAD ONC MSQ ACTUAL TOTAL DOSE: 1200 CGRAY
RAD ONC MSQ ELAPSED DAYS: 7
RAD ONC MSQ LAST DATE: NORMAL
RAD ONC MSQ PRESCRIBED FRACTIONAL DOSE: 200 CGRAY
RAD ONC MSQ PRESCRIBED NUMBER OF FRACTIONS: 10
RAD ONC MSQ PRESCRIBED TECHNIQUE: NORMAL
RAD ONC MSQ PRESCRIBED TOTAL DOSE: 2000 CGRAY
RAD ONC MSQ PRESCRIPTION PATTERN COMMENT: NORMAL
RAD ONC MSQ START DATE: NORMAL
RAD ONC MSQ TREATMENT COURSE NUMBER: 1
RAD ONC MSQ TREATMENT SITE: NORMAL

## 2024-04-10 PROCEDURE — 77386 HC INTENSITY-MODULATED RADIATION THERAPY (IMRT), COMPLEX: CPT | Performed by: STUDENT IN AN ORGANIZED HEALTH CARE EDUCATION/TRAINING PROGRAM

## 2024-04-10 PROCEDURE — 77014 CHG CT GUIDANCE RADIATION THERAPY FLDS PLACEMENT: CPT | Performed by: STUDENT IN AN ORGANIZED HEALTH CARE EDUCATION/TRAINING PROGRAM

## 2024-04-11 ENCOUNTER — RADIATION ONCOLOGY OTV (OUTPATIENT)
Dept: RADIATION ONCOLOGY | Facility: CLINIC | Age: 49
End: 2024-04-11
Payer: COMMERCIAL

## 2024-04-11 ENCOUNTER — HOSPITAL ENCOUNTER (OUTPATIENT)
Dept: RADIATION ONCOLOGY | Facility: CLINIC | Age: 49
Setting detail: RADIATION/ONCOLOGY SERIES
Discharge: HOME | End: 2024-04-11
Payer: COMMERCIAL

## 2024-04-11 VITALS
OXYGEN SATURATION: 95 % | DIASTOLIC BLOOD PRESSURE: 95 MMHG | SYSTOLIC BLOOD PRESSURE: 152 MMHG | HEART RATE: 83 BPM | WEIGHT: 236.4 LBS | TEMPERATURE: 97.5 F | BODY MASS INDEX: 36.46 KG/M2 | RESPIRATION RATE: 18 BRPM

## 2024-04-11 DIAGNOSIS — C81.72: ICD-10-CM

## 2024-04-11 DIAGNOSIS — Z51.0 ENCOUNTER FOR ANTINEOPLASTIC RADIATION THERAPY: ICD-10-CM

## 2024-04-11 DIAGNOSIS — C81.72 OTHER CLASSICAL HODGKIN LYMPHOMA OF INTRATHORACIC LYMPH NODES (MULTI): Primary | ICD-10-CM

## 2024-04-11 LAB
RAD ONC MSQ ACTUAL FRACTIONS DELIVERED: 7
RAD ONC MSQ ACTUAL SESSION DELIVERED DOSE: 200 CGRAY
RAD ONC MSQ ACTUAL TOTAL DOSE: 1400 CGRAY
RAD ONC MSQ ELAPSED DAYS: 8
RAD ONC MSQ LAST DATE: NORMAL
RAD ONC MSQ PRESCRIBED FRACTIONAL DOSE: 200 CGRAY
RAD ONC MSQ PRESCRIBED NUMBER OF FRACTIONS: 10
RAD ONC MSQ PRESCRIBED TECHNIQUE: NORMAL
RAD ONC MSQ PRESCRIBED TOTAL DOSE: 2000 CGRAY
RAD ONC MSQ PRESCRIPTION PATTERN COMMENT: NORMAL
RAD ONC MSQ START DATE: NORMAL
RAD ONC MSQ TREATMENT COURSE NUMBER: 1
RAD ONC MSQ TREATMENT SITE: NORMAL

## 2024-04-11 PROCEDURE — 77427 RADIATION TX MANAGEMENT X5: CPT | Performed by: STUDENT IN AN ORGANIZED HEALTH CARE EDUCATION/TRAINING PROGRAM

## 2024-04-11 PROCEDURE — 77386 HC INTENSITY-MODULATED RADIATION THERAPY (IMRT), COMPLEX: CPT | Performed by: STUDENT IN AN ORGANIZED HEALTH CARE EDUCATION/TRAINING PROGRAM

## 2024-04-11 PROCEDURE — 77014 CHG CT GUIDANCE RADIATION THERAPY FLDS PLACEMENT: CPT | Performed by: STUDENT IN AN ORGANIZED HEALTH CARE EDUCATION/TRAINING PROGRAM

## 2024-04-11 ASSESSMENT — PAIN SCALES - GENERAL: PAINLEVEL: 0-NO PAIN

## 2024-04-11 NOTE — PROGRESS NOTES
Radiation Oncology On Treatment Visit    Patient Name:  Edward Wolfe  MRN:  [id number]  :  1975    Referring Provider: No ref. provider found  Primary Care Provider: Nellie Harrison DO  Care Team: Patient Care Team:  Nellie Harrison DO as PCP - General  Nellie Harrison DO as PCP - [facility] PCP  James Rader MD as Consulting Physician (Hematology and Oncology)  Abelino Flores MD as Consulting Physician (Hematology and Oncology)    Date of Service: 2024     Diagnosis:   Specialty Problems          Radiation Oncology Problems    Hodgkin lymphoma of intrathoracic lymph nodes (CMS/HCC)        Hodgkin lymphoma of lymph nodes of multiple regions (CMS/HCC)        Other classical Hodgkin lymphoma of intrathoracic lymph nodes (CMS/HCC)         Treatment Summary:  Radiation Treatments       Active   RtSCVThorax (Started on 4/3/2024)   Most recent fraction: 200 cGy given on 2024   Total given: 1,400 cGy / 2,000 cGy  (7 of 10 fractions)   Elapsed Days: 8   Technique: IMRT           Completed   No historical radiation treatments to show.             SUBJECTIVE: Doing well with no complaints.  Denies fatigue, shortness of breath, cough, odynophagia, or skin irritation.      OBJECTIVE:   Vital Signs:  BP (!) 152/95   Pulse 83   Temp 36.4 °C (97.5 °F)   Resp 18   Wt 107 kg (236 lb 6.4 oz)   SpO2 95%   BMI 36.46 kg/m²    Pain Scale: The patient's current pain level was assessed.  They report currently having a pain of 0 out of 10.    Other Pertinent Findings:   No evidence of dermatitis  Toxicity Assessment          2024    15:44 2024    15:44   Toxicity Assessment   Adverse Events Reviewed (WDL) No (Exceptions to WDL) No (Exceptions to WDL)   Treatment Site Thoracic Thoracic        Assessment / Plan:  The patient is tolerating radiation therapy as anticipated.  Continue per current treatment plan.   FUV in 3 months    Thank you for allowing me to participate in this patient's care.    Dayton  MD Nancie  Department of Radiation Oncology  [facility]    Portions of this note were generated using voice recognition software, and may be subject to transcription errors.

## 2024-04-12 ENCOUNTER — HOSPITAL ENCOUNTER (OUTPATIENT)
Dept: RADIATION ONCOLOGY | Facility: CLINIC | Age: 49
Setting detail: RADIATION/ONCOLOGY SERIES
Discharge: HOME | End: 2024-04-12
Payer: COMMERCIAL

## 2024-04-12 DIAGNOSIS — Z51.0 ENCOUNTER FOR ANTINEOPLASTIC RADIATION THERAPY: ICD-10-CM

## 2024-04-12 DIAGNOSIS — C81.72: ICD-10-CM

## 2024-04-12 LAB
RAD ONC MSQ ACTUAL FRACTIONS DELIVERED: 8
RAD ONC MSQ ACTUAL SESSION DELIVERED DOSE: 200 CGRAY
RAD ONC MSQ ACTUAL TOTAL DOSE: 1600 CGRAY
RAD ONC MSQ ELAPSED DAYS: 9
RAD ONC MSQ LAST DATE: NORMAL
RAD ONC MSQ PRESCRIBED FRACTIONAL DOSE: 200 CGRAY
RAD ONC MSQ PRESCRIBED NUMBER OF FRACTIONS: 10
RAD ONC MSQ PRESCRIBED TECHNIQUE: NORMAL
RAD ONC MSQ PRESCRIBED TOTAL DOSE: 2000 CGRAY
RAD ONC MSQ PRESCRIPTION PATTERN COMMENT: NORMAL
RAD ONC MSQ START DATE: NORMAL
RAD ONC MSQ TREATMENT COURSE NUMBER: 1
RAD ONC MSQ TREATMENT SITE: NORMAL

## 2024-04-12 PROCEDURE — 77386 HC INTENSITY-MODULATED RADIATION THERAPY (IMRT), COMPLEX: CPT | Performed by: STUDENT IN AN ORGANIZED HEALTH CARE EDUCATION/TRAINING PROGRAM

## 2024-04-12 PROCEDURE — 77014 CHG CT GUIDANCE RADIATION THERAPY FLDS PLACEMENT: CPT | Performed by: STUDENT IN AN ORGANIZED HEALTH CARE EDUCATION/TRAINING PROGRAM

## 2024-04-15 ENCOUNTER — HOSPITAL ENCOUNTER (OUTPATIENT)
Dept: RADIATION ONCOLOGY | Facility: CLINIC | Age: 49
Setting detail: RADIATION/ONCOLOGY SERIES
Discharge: HOME | End: 2024-04-15
Payer: COMMERCIAL

## 2024-04-15 DIAGNOSIS — Z51.0 ENCOUNTER FOR ANTINEOPLASTIC RADIATION THERAPY: ICD-10-CM

## 2024-04-15 DIAGNOSIS — C81.72: ICD-10-CM

## 2024-04-15 LAB
RAD ONC MSQ ACTUAL FRACTIONS DELIVERED: 9
RAD ONC MSQ ACTUAL SESSION DELIVERED DOSE: 200 CGRAY
RAD ONC MSQ ACTUAL TOTAL DOSE: 1800 CGRAY
RAD ONC MSQ ELAPSED DAYS: 12
RAD ONC MSQ LAST DATE: NORMAL
RAD ONC MSQ PRESCRIBED FRACTIONAL DOSE: 200 CGRAY
RAD ONC MSQ PRESCRIBED NUMBER OF FRACTIONS: 10
RAD ONC MSQ PRESCRIBED TECHNIQUE: NORMAL
RAD ONC MSQ PRESCRIBED TOTAL DOSE: 2000 CGRAY
RAD ONC MSQ PRESCRIPTION PATTERN COMMENT: NORMAL
RAD ONC MSQ START DATE: NORMAL
RAD ONC MSQ TREATMENT COURSE NUMBER: 1
RAD ONC MSQ TREATMENT SITE: NORMAL

## 2024-04-15 PROCEDURE — 77014 CHG CT GUIDANCE RADIATION THERAPY FLDS PLACEMENT: CPT | Performed by: STUDENT IN AN ORGANIZED HEALTH CARE EDUCATION/TRAINING PROGRAM

## 2024-04-15 PROCEDURE — 77336 RADIATION PHYSICS CONSULT: CPT | Performed by: STUDENT IN AN ORGANIZED HEALTH CARE EDUCATION/TRAINING PROGRAM

## 2024-04-15 PROCEDURE — 77386 HC INTENSITY-MODULATED RADIATION THERAPY (IMRT), COMPLEX: CPT | Performed by: STUDENT IN AN ORGANIZED HEALTH CARE EDUCATION/TRAINING PROGRAM

## 2024-04-16 ENCOUNTER — DOCUMENTATION (OUTPATIENT)
Dept: RADIATION ONCOLOGY | Facility: CLINIC | Age: 49
End: 2024-04-16
Payer: COMMERCIAL

## 2024-04-16 ENCOUNTER — HOSPITAL ENCOUNTER (OUTPATIENT)
Dept: RADIATION ONCOLOGY | Facility: CLINIC | Age: 49
Setting detail: RADIATION/ONCOLOGY SERIES
Discharge: HOME | End: 2024-04-16
Payer: COMMERCIAL

## 2024-04-16 DIAGNOSIS — C81.72: ICD-10-CM

## 2024-04-16 DIAGNOSIS — Z51.0 ENCOUNTER FOR ANTINEOPLASTIC RADIATION THERAPY: ICD-10-CM

## 2024-04-16 LAB
RAD ONC MSQ ACTUAL FRACTIONS DELIVERED: 10
RAD ONC MSQ ACTUAL SESSION DELIVERED DOSE: 200 CGRAY
RAD ONC MSQ ACTUAL TOTAL DOSE: 2000 CGRAY
RAD ONC MSQ ELAPSED DAYS: 13
RAD ONC MSQ LAST DATE: NORMAL
RAD ONC MSQ PRESCRIBED FRACTIONAL DOSE: 200 CGRAY
RAD ONC MSQ PRESCRIBED NUMBER OF FRACTIONS: 10
RAD ONC MSQ PRESCRIBED TECHNIQUE: NORMAL
RAD ONC MSQ PRESCRIBED TOTAL DOSE: 2000 CGRAY
RAD ONC MSQ PRESCRIPTION PATTERN COMMENT: NORMAL
RAD ONC MSQ START DATE: NORMAL
RAD ONC MSQ TREATMENT COURSE NUMBER: 1
RAD ONC MSQ TREATMENT SITE: NORMAL

## 2024-04-16 PROCEDURE — 77386 HC INTENSITY-MODULATED RADIATION THERAPY (IMRT), COMPLEX: CPT | Performed by: STUDENT IN AN ORGANIZED HEALTH CARE EDUCATION/TRAINING PROGRAM

## 2024-04-16 PROCEDURE — 77014 CHG CT GUIDANCE RADIATION THERAPY FLDS PLACEMENT: CPT | Performed by: STUDENT IN AN ORGANIZED HEALTH CARE EDUCATION/TRAINING PROGRAM

## 2024-04-17 NOTE — PROGRESS NOTES
Radiation Oncology Treatment Summary    Patient Name:  Edward Wolfe  MRN:  [id number]  :  1975    Referring Provider: Abelino Flores MD  Primary Care Provider: Nellie Harrison DO    Brief History: Edward Wolfe is a 48 y.o. male with Stage IIA classic Hodgkin lymphoma of the mediastinum and right SCV, ESR not known at diagnosis but otherwise considered favorable, s/p ABVD x2 with a Deauville 2 response.  S/p ISRT 20 Gy in 10 fractions completed 24.    Radiation Treatment Summary:    Radiation Treatments       Active   No active radiation treatments to show.     Completed   RtSCVThorax (Started on 4/3/2024)   Most recent fraction: 200 cGy given on 2024   Total given: 2,000 cGy / 2,000 cGy  (10 of 10 fractions)   Elapsed Days: 13   Technique: IMRT                     Please see the patient's Mosaiq chart for further details regarding the radiation plan, including beam energy.    Concurrent Chemotherapy:  none    CTCAE Toxicity Overview:   Toxicity Assessment          2024    15:44 2024    15:44   Toxicity Assessment   Adverse Events Reviewed (WDL) No (Exceptions to WDL) No (Exceptions to WDL)   Treatment Site Thoracic Thoracic     Patient Disposition: Patient to return to clinic in 3 months. He was instructed to reach out with any questions or concerns in the meantime.    Thank you for allowing me to participate in this patient's care.    Dayton Canada MD  Department of Radiation Oncology  [facility]    Portions of this note were generated using voice recognition software, and may be subject to transcription errors.

## 2024-05-03 ENCOUNTER — INFUSION (OUTPATIENT)
Dept: HEMATOLOGY/ONCOLOGY | Facility: CLINIC | Age: 49
End: 2024-05-03
Payer: COMMERCIAL

## 2024-05-03 DIAGNOSIS — C81.12 NODULAR SCLEROSIS HODGKIN LYMPHOMA OF INTRATHORACIC LYMPH NODES (MULTI): ICD-10-CM

## 2024-05-03 DIAGNOSIS — C81.72 OTHER CLASSICAL HODGKIN LYMPHOMA OF INTRATHORACIC LYMPH NODES (MULTI): ICD-10-CM

## 2024-05-03 LAB
ALBUMIN SERPL BCP-MCNC: 4.8 G/DL (ref 3.4–5)
ALP SERPL-CCNC: 57 U/L (ref 33–120)
ALT SERPL W P-5'-P-CCNC: 26 U/L (ref 10–52)
ANION GAP SERPL CALC-SCNC: 14 MMOL/L (ref 10–20)
AST SERPL W P-5'-P-CCNC: 24 U/L (ref 9–39)
BASOPHILS # BLD AUTO: 0.01 X10*3/UL (ref 0–0.1)
BASOPHILS NFR BLD AUTO: 0.2 %
BILIRUB SERPL-MCNC: 0.4 MG/DL (ref 0–1.2)
BUN SERPL-MCNC: 13 MG/DL (ref 6–23)
CALCIUM SERPL-MCNC: 9.2 MG/DL (ref 8.6–10.3)
CHLORIDE SERPL-SCNC: 106 MMOL/L (ref 98–107)
CO2 SERPL-SCNC: 22 MMOL/L (ref 21–32)
CREAT SERPL-MCNC: 0.98 MG/DL (ref 0.5–1.3)
EGFRCR SERPLBLD CKD-EPI 2021: >90 ML/MIN/1.73M*2
EOSINOPHIL # BLD AUTO: 0.09 X10*3/UL (ref 0–0.7)
EOSINOPHIL NFR BLD AUTO: 2 %
ERYTHROCYTE [DISTWIDTH] IN BLOOD BY AUTOMATED COUNT: 12.3 % (ref 11.5–14.5)
GLUCOSE SERPL-MCNC: 94 MG/DL (ref 74–99)
HCT VFR BLD AUTO: 36.8 % (ref 41–52)
HGB BLD-MCNC: 12.7 G/DL (ref 13.5–17.5)
IMM GRANULOCYTES # BLD AUTO: 0.01 X10*3/UL (ref 0–0.7)
IMM GRANULOCYTES NFR BLD AUTO: 0.2 % (ref 0–0.9)
IRON SATN MFR SERPL: 21 % (ref 25–45)
IRON SERPL-MCNC: 91 UG/DL (ref 35–150)
LYMPHOCYTES # BLD AUTO: 0.78 X10*3/UL (ref 1.2–4.8)
LYMPHOCYTES NFR BLD AUTO: 17 %
MCH RBC QN AUTO: 30.9 PG (ref 26–34)
MCHC RBC AUTO-ENTMCNC: 34.5 G/DL (ref 32–36)
MCV RBC AUTO: 90 FL (ref 80–100)
MONOCYTES # BLD AUTO: 0.71 X10*3/UL (ref 0.1–1)
MONOCYTES NFR BLD AUTO: 15.4 %
NEUTROPHILS # BLD AUTO: 3 X10*3/UL (ref 1.2–7.7)
NEUTROPHILS NFR BLD AUTO: 65.2 %
NRBC BLD-RTO: 0 /100 WBCS (ref 0–0)
PLATELET # BLD AUTO: 388 X10*3/UL (ref 150–450)
POTASSIUM SERPL-SCNC: 4 MMOL/L (ref 3.5–5.3)
PROT SERPL-MCNC: 7.4 G/DL (ref 6.4–8.2)
RBC # BLD AUTO: 4.11 X10*6/UL (ref 4.5–5.9)
SODIUM SERPL-SCNC: 138 MMOL/L (ref 136–145)
TIBC SERPL-MCNC: 426 UG/DL (ref 240–445)
UIBC SERPL-MCNC: 335 UG/DL (ref 110–370)
WBC # BLD AUTO: 4.6 X10*3/UL (ref 4.4–11.3)

## 2024-05-03 PROCEDURE — 85025 COMPLETE CBC W/AUTO DIFF WBC: CPT

## 2024-05-03 PROCEDURE — 2500000004 HC RX 250 GENERAL PHARMACY W/ HCPCS (ALT 636 FOR OP/ED): Performed by: INTERNAL MEDICINE

## 2024-05-03 PROCEDURE — 36591 DRAW BLOOD OFF VENOUS DEVICE: CPT

## 2024-05-03 PROCEDURE — 96523 IRRIG DRUG DELIVERY DEVICE: CPT

## 2024-05-03 PROCEDURE — 83540 ASSAY OF IRON: CPT

## 2024-05-03 PROCEDURE — 80053 COMPREHEN METABOLIC PANEL: CPT

## 2024-05-03 RX ORDER — HEPARIN 100 UNIT/ML
500 SYRINGE INTRAVENOUS AS NEEDED
OUTPATIENT
Start: 2024-05-03

## 2024-05-03 RX ORDER — HEPARIN 100 UNIT/ML
500 SYRINGE INTRAVENOUS AS NEEDED
Status: DISCONTINUED | OUTPATIENT
Start: 2024-05-03 | End: 2024-05-03 | Stop reason: HOSPADM

## 2024-05-03 RX ORDER — HEPARIN SODIUM,PORCINE/PF 10 UNIT/ML
50 SYRINGE (ML) INTRAVENOUS AS NEEDED
OUTPATIENT
Start: 2024-05-03

## 2024-05-03 RX ADMIN — HEPARIN 500 UNITS: 100 SYRINGE at 15:13

## 2024-05-06 ENCOUNTER — OFFICE VISIT (OUTPATIENT)
Dept: HEMATOLOGY/ONCOLOGY | Facility: CLINIC | Age: 49
End: 2024-05-06
Payer: COMMERCIAL

## 2024-05-06 VITALS
HEART RATE: 73 BPM | RESPIRATION RATE: 16 BRPM | OXYGEN SATURATION: 94 % | DIASTOLIC BLOOD PRESSURE: 76 MMHG | SYSTOLIC BLOOD PRESSURE: 135 MMHG | BODY MASS INDEX: 35.9 KG/M2 | WEIGHT: 232.81 LBS | TEMPERATURE: 97.3 F

## 2024-05-06 DIAGNOSIS — C81.72 OTHER CLASSICAL HODGKIN LYMPHOMA OF INTRATHORACIC LYMPH NODES (MULTI): Primary | ICD-10-CM

## 2024-05-06 PROCEDURE — 99214 OFFICE O/P EST MOD 30 MIN: CPT | Performed by: INTERNAL MEDICINE

## 2024-05-06 PROCEDURE — 3078F DIAST BP <80 MM HG: CPT | Performed by: INTERNAL MEDICINE

## 2024-05-06 PROCEDURE — 3075F SYST BP GE 130 - 139MM HG: CPT | Performed by: INTERNAL MEDICINE

## 2024-05-06 ASSESSMENT — PAIN SCALES - GENERAL: PAINLEVEL: 0-NO PAIN

## 2024-05-06 NOTE — PROGRESS NOTES
Patient ID: Edward Wolfe is a 48 y.o. male.  Referring Physician: No referring provider defined for this encounter.  Primary Care Provider: Nellie Harrison DO  Cancer history:    Classical Hodgkin's lymphoma involving right paratracheal lymph node.  PET scan positive in mediastinum as well as left supraglottic.  Stage IIa  A PET scan after 2 cycles of ABVD revealed excellent response Deauville score of 2.  Total dose Adriamycin 232 mg.  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  CT scan of chest revealed interval increase in the size of an irregular mass in the anterior mediastinum with associated calcification when compared to prior CT scan examination on August 11, 2021.  Prominent mediastinal lymphadenopathy is also noted which is increased compared to prior examination in 2021.  Findings are suspicious.  An EBUS by  on November 7, 2023 revealed anterior mediastinal paratracheal lymph node mass.  Biopsy consistent with classic Hodgkin's lymphoma.  A PET scan on September 18, 2023 revealedIMPRESSION:  1. Hypermetabolic anterior mediastinal mass which is concerning for  neoplasm. Further evaluation with soft tissue biopsy can be  considered.  2. Hypermetabolic mediastinal and right supraclavicular lymph nodes  which are concerning for metastatic lymphadenopathy.  3. No evidence of distal hypermetabolic lesions concerning for  metastases. the patient was referred for further evaluation and management.    At interview on November 22, 2023 the patient and his wife narrated entire history.  The patient is asymptomatic.  Denied a history of weight loss, fevers, night sweats, chest pain, shortness of breath, nausea, vomiting, hematemesis, melena, hematochezia and  hematuria.    The patient and his wife had come for follow-up on December 26, 2023 regarding history of stage IIa classical Hodgkin's lymphoma involving left supraclavicular and right mediastinal lymph nodes.  The patient is asymptomatic.  Zuflem-l-Xvtq was placed, color Doppler echocardiogram and pulmonary function tests were done as well.  The patient is asymptomatic.    The patient and wife had come for follow-up January 31, 2024 regarding history of stage IIa classical Hodgkin's lymphoma involving left supraclavicular and right mediastinal lymph nodes.  The patient was placed on a chemotherapy regimen using ABVD.  He has received and tolerated cycle 1 day 1 and 14 without any problems.    The patient   had come for follow-up on May 6, 2024 regarding history of stage IIa classical Hodgkin's lymphoma involving left supraclavicular and right mediastinal lymph nodes.  The patient received 2 cycles of chemotherapy using ABVD.  A PET scan revealed excellent response with Deauville score of 2, status post radiation therapy.  The patient is feeling much better.    Past medical history:    Hypertension, hypercholesterolemia.    Past surgical history:    Right shoulder and right hip surgery.    Colonoscopy:    September 2023.    Family history:    Reviewed but not relevant.    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    Review of Systems   All other systems reviewed and are negative.       Objective   BSA: 2.25 meters squared  /76   Pulse 73   Temp 36.3 °C (97.3 °F)   Resp 16   Wt 106 kg (232 lb 12.9 oz)   SpO2 94%   BMI 35.90 kg/m²     Family History   Problem Relation Name Age of Onset    Lung cancer Father       Oncology History   Hodgkin lymphoma of intrathoracic lymph nodes (Multi)   11/22/2023 Initial Diagnosis    Hodgkin lymphoma of intrathoracic lymph nodes (CMS/HCC)      1/5/2024 -  Chemotherapy    ABVD (DOXOrubicin / Bleomycin / VinBLAStine / Dacarbazine), 28 Day Cycles     3/12/2024 Cancer Staged    Staging form: Hodgkin and Non-Hodgkin Lymphoma, AJCC 8th Edition, Clinical stage from 3/12/2024: Stage II (Hodgkin lymphoma, A - Asymptomatic) - Signed by Dayton Canada MD on 3/12/2024         Edward Wolfe  reports that he has never smoked. He has never been exposed to tobacco smoke. He has never used smokeless tobacco.  He  reports current alcohol use.  He  reports no history of drug use.    Physical Exam  Constitutional:       Appearance: Normal appearance.   HENT:      Head: Normocephalic and atraumatic.      Nose: Nose normal.      Mouth/Throat:      Mouth: Mucous membranes are moist.      Pharynx: Oropharynx is clear.   Eyes:      Extraocular Movements: Extraocular movements intact.      Conjunctiva/sclera: Conjunctivae normal.      Pupils: Pupils are equal, round, and reactive to light.   Cardiovascular:      Rate and Rhythm: Normal rate and regular rhythm.   Pulmonary:      Effort: Pulmonary effort is normal.      Breath sounds: Normal breath sounds.   Abdominal:      General: Abdomen is flat. Bowel sounds are normal.      Palpations: Abdomen is soft.   Musculoskeletal:         General: Normal range of motion.      Cervical back: Normal range of motion and neck supple.   Neurological:      General: No focal deficit present.      Mental Status: He is alert and oriented to person, place, and time. Mental status is at baseline.   Psychiatric:         Mood and Affect: Mood normal.         Behavior: Behavior normal.         Thought Content: Thought content normal.         Judgment: Judgment normal.         Performance Status:  Asymptomatic    Assessment/Plan    The patient is a 48-year-old man with past medical history of hypertension, hypercholesterolemia and a newly diagnosed classical Hodgkin's lymphoma involving mediastinum as well as possible right supraclavicular lymph  node.  Physical examination was within normal limits.  There was no palpable cervical axillary inguinal lymphadenopathy.  I had a detailed discussion with the patient explained to him that it would be prudent to obtain more staging information such as bone marrow aspiration biopsy and also consider color Doppler echocardiogram, pulmonary function test as well as a port placement.  The patient and his wife understood appreciated all the details provided and were grateful.    Patient and his wife had come for follow-up on December 26, 2023 regarding history of classical Hodgkin's lymphoma involving left supra clavicular and mediastinal/hilar lymph nodes, stage IIa.  Color Doppler echocardiogram revealed ejection fraction 65% pulmonary function tests in reference range, Ancaeo-e-Msng was placed.  And a detailed discussion with the patient explained to him that if agreeable would recommend chemotherapy using Adriamycin, bleomycin, vinblastine, dacarbazine every 2 weeks x 4 cycles followed by PET scan and then radiation oncology evaluation.  Other option to do nothing.  After thinking through all the options the patient is agreeable to start chemotherapy.  Effect side effects explained.  Printed material from [facility] provided.  Informed consent obtained.  The patient to start chemotherapy first week of January 2024.  The patient and wife had come for follow-up January 31, 2024 regarding history of stage IIa classical Hodgkin's lymphoma involving left supraclavicular and right mediastinal lymph nodes.  The patient was placed on a chemotherapy regimen using ABVD.  He has received and tolerated cycle 1 day 1 and 14 without any problems.  The patient to receive cycle 2 day 1 on February 2, 2024.  I have recommended restaging PET scan after completion of cycle 2 and return in 4 weeks.    The patient   had come for follow-up on May 6, 2024 regarding history of stage IIa classical Hodgkin's lymphoma involving left supraclavicular  and right mediastinal lymph nodes.  The patient received 2 cycles of chemotherapy using ABVD.  A PET scan revealed excellent response with Deauville score of 2.  The patient received radiation therapy, 20 GY in 10 fractions, completed on April 16, 2024 the patient is feeling much better.  Recommend port removal and return in 3 months.      Thank you for allowing me to participate in the care of your patient if you have any questions please feel free to call me      Diagnoses and all orders for this visit:  Hodgkin lymphoma of intrathoracic lymph nodes, unspecified Hodgkin lymphoma type (CMS/HCC)  -     Bone Marrow Evaluation  -     Onco-Echo Complete (Strain And 3D); Future  -     Pulmonary function testing; Future  Lymph node enlargement  -     Referral to Malignant Hematology (Hematology / Oncology)  -     Bone Marrow Evaluation  -     Onco-Echo Complete (Strain And 3D); Future  -     Pulmonary function testing; Future  Other classical Hodgkin lymphoma of lymph nodes of multiple regions (CMS/HCC)  -     Bone Marrow Evaluation  -     Onco-Echo Complete (Strain And 3D); Future  -     Pulmonary function testing; Future           Abelino Flores MD

## 2024-05-23 ENCOUNTER — HOSPITAL ENCOUNTER (OUTPATIENT)
Dept: RADIOLOGY | Facility: HOSPITAL | Age: 49
Discharge: HOME | End: 2024-05-23
Payer: COMMERCIAL

## 2024-05-23 VITALS
HEART RATE: 71 BPM | RESPIRATION RATE: 16 BRPM | SYSTOLIC BLOOD PRESSURE: 128 MMHG | DIASTOLIC BLOOD PRESSURE: 85 MMHG | TEMPERATURE: 99.9 F | OXYGEN SATURATION: 94 %

## 2024-05-23 DIAGNOSIS — C81.72 OTHER CLASSICAL HODGKIN LYMPHOMA OF INTRATHORACIC LYMPH NODES (MULTI): ICD-10-CM

## 2024-05-23 PROCEDURE — 7100000010 HC PHASE TWO TIME - EACH INCREMENTAL 1 MINUTE

## 2024-05-23 PROCEDURE — 36589 REMOVAL TUNNELED CV CATH: CPT

## 2024-05-23 PROCEDURE — 7100000009 HC PHASE TWO TIME - INITIAL BASE CHARGE

## 2024-05-23 PROCEDURE — 2500000005 HC RX 250 GENERAL PHARMACY W/O HCPCS: Performed by: RADIOLOGY

## 2024-05-23 RX ORDER — LIDOCAINE HYDROCHLORIDE 10 MG/ML
INJECTION, SOLUTION EPIDURAL; INFILTRATION; INTRACAUDAL; PERINEURAL
Status: DISCONTINUED | OUTPATIENT
Start: 2024-05-23 | End: 2024-05-24 | Stop reason: HOSPADM

## 2024-05-23 RX ORDER — LIDOCAINE HYDROCHLORIDE 20 MG/ML
INJECTION, SOLUTION EPIDURAL; INFILTRATION; INTRACAUDAL; PERINEURAL
Status: COMPLETED | OUTPATIENT
Start: 2024-05-23 | End: 2024-05-23

## 2024-05-23 RX ADMIN — LIDOCAINE HYDROCHLORIDE 5 ML: 20 INJECTION, SOLUTION EPIDURAL; INFILTRATION; INTRACAUDAL; PERINEURAL at 13:34

## 2024-05-23 ASSESSMENT — PAIN - FUNCTIONAL ASSESSMENT
PAIN_FUNCTIONAL_ASSESSMENT: 0-10

## 2024-05-23 ASSESSMENT — PAIN SCALES - GENERAL
PAINLEVEL_OUTOF10: 0 - NO PAIN

## 2024-05-23 NOTE — PRE-PROCEDURE NOTE
Interventional Radiology Preprocedure Note    Indication for procedure: The encounter diagnosis was Other classical Hodgkin lymphoma of intrathoracic lymph nodes (Multi).    Relevant review of systems: NA    Relevant Labs:   Lab Results   Component Value Date    CREATININE 0.98 05/03/2024    EGFR >90 05/03/2024    INR 1.0 10/04/2023    PROTIME 11.2 10/04/2023       Planned Sedation/Anesthesia: Moderate    Airway assessment: normal    Directed physical examination:    Aox3  No increased work of breathing.   No acute distress      Mallampati: II (hard and soft palate, upper portion of tonsils and uvula visible)    ASA Score: ASA 2 - Patient with mild systemic disease with no functional limitations    Benefits, risks and alternatives of procedure and planned sedation have been discussed with the patient and/or their representative. All questions answered and they agree to proceed.

## 2024-05-23 NOTE — DISCHARGE INSTRUCTIONS
Home going mediport removal instructions  reviewed. Verbalized understanding. Decline paper print out ;will review my chart.

## 2024-05-23 NOTE — PROCEDURES
Interventional Radiology Brief Postprocedure Note    Attending: Blanca Haas MD    Assistant:   Staff Role   Geetha Sanches MT Radiology Technologist   Susan Jaeger, RN Radiology Nurse   Aris Bates MT Radiology Technologist   Blanca Haas MD Radiologist       Diagnosis:   1. Other classical Hodgkin lymphoma of intrathoracic lymph nodes (Multi)  IR CVC removal    IR CVC removal          Description of procedure: IR CVC removal    Timeout:  Yes    Procedure Area: Procedure Area     Anesthesia:   Conscious Sedation    Complications: None    Estimated Blood Loss: minimal    Medications (Filter: Administrations occurring from 1325 to 1355 on 05/23/24) As of 05/23/24 1355      lidocaine PF (Xylocaine) 10 mg/mL (1 %) injection (mL) Total volume:  5 mL      Date/Time Rate/Dose/Volume Action       05/23/24  1334 5 mL Given                   No specimens collected      See detailed result report with images in PACS.    The patient tolerated the procedure well without incident or complication and is in stable condition.

## 2024-06-06 LAB
NON-UH HIE A/G RATIO: 1.2
NON-UH HIE ALB: 4.1 G/DL (ref 3.4–5)
NON-UH HIE ALK PHOS: 89 UNIT/L (ref 45–117)
NON-UH HIE BILIRUBIN, TOTAL: 0.3 MG/DL (ref 0.3–1.2)
NON-UH HIE BUN/CREAT RATIO: 16
NON-UH HIE BUN: 16 MG/DL (ref 9–23)
NON-UH HIE CALCIUM: 9.2 MG/DL (ref 8.7–10.4)
NON-UH HIE CALCULATED LDL CHOLESTEROL: 117 MG/DL (ref 60–130)
NON-UH HIE CALCULATED OSMOLALITY: 278 MOSM/KG (ref 275–295)
NON-UH HIE CHLORIDE: 105 MMOL/L (ref 98–107)
NON-UH HIE CHOLESTEROL: 182 MG/DL (ref 100–200)
NON-UH HIE CO2, VENOUS: 24 MMOL/L (ref 20–31)
NON-UH HIE CREATININE: 1 MG/DL (ref 0.6–1.1)
NON-UH HIE GFR AA: >60
NON-UH HIE GLOBULIN: 3.5 G/DL
NON-UH HIE GLOMERULAR FILTRATION RATE: >60 ML/MIN/1.73M?
NON-UH HIE GLUCOSE: 114 MG/DL (ref 74–106)
NON-UH HIE GOT: 28 UNIT/L (ref 15–37)
NON-UH HIE GPT: 34 UNIT/L (ref 10–49)
NON-UH HIE HCT: 42.9 % (ref 41–52)
NON-UH HIE HDL CHOLESTEROL: 38 MG/DL (ref 40–60)
NON-UH HIE HGB A1C: 5.5 %
NON-UH HIE HGB: 14.4 G/DL (ref 13.5–17.5)
NON-UH HIE INSTR WBC ND: 6.5
NON-UH HIE K: 4.4 MMOL/L (ref 3.5–5.1)
NON-UH HIE MCH: 30.2 PG (ref 27–34)
NON-UH HIE MCHC: 33.6 G/DL (ref 32–37)
NON-UH HIE MCV: 89.8 FL (ref 80–100)
NON-UH HIE MPV: 7.3 FL (ref 7.4–10.4)
NON-UH HIE NA: 138 MMOL/L (ref 135–145)
NON-UH HIE PLATELET: 325 X10 (ref 150–450)
NON-UH HIE PROSTATIC SPECIFIC AG SCREEN: 2.1 NG/ML (ref 0–4)
NON-UH HIE RBC: 4.78 X10 (ref 4.7–6.1)
NON-UH HIE RDW: 13.2 % (ref 11.5–14.5)
NON-UH HIE TOTAL CHOL/HDL CHOL RATIO: 4.8
NON-UH HIE TOTAL PROTEIN: 7.6 G/DL (ref 5.7–8.2)
NON-UH HIE TRIGLYCERIDES: 135 MG/DL (ref 30–150)
NON-UH HIE TSH: 0.59 UIU/ML (ref 0.55–4.78)
NON-UH HIE WBC: 6.5 X10 (ref 4.5–11)

## 2024-06-07 NOTE — PROGRESS NOTES
"Subjective   Patient ID: Edward Wolfe is a 49 y.o. male who presents for Annual Exam.    HPI   49 year-old male presents for a physical.     Recent labs:   HDL 38   Glucose 114 Hba1c 5.5   Tsh, psa, cbc wnl    Diagnosed with stage IIa classical Hodgkin's lymphoma involving left supraclavicular and right mediastinal lymph nodes 2023; s/p chemo/radiation  sees onco-dr Flores    hx of HTN- on lisinopril 10mg; does not check BP at home.   hx of HLD- on gemfibrozil and atorvastatin  hx of anxiety- on zoloft 150mg/day and doing ok  hx elevated glucose    BMI 36  +exercise and trying to eat a healthy diet    tetanus- 2021  flu shot- defers  Shingrix-not yet      wears glasses; sees optho  sees dentist     he denies any chest pains, palpitations, edema, shortness of breath, abdominal pains, reflux, nausea, vomiting, diarrhea, constipation, blood in stool, melena. no groin or testicle pain or swelling.     Denies any mole changes.  Saw derm in past  No hx skin CA    9/2023 colonoscopy fu 5 yrs GI [facility]  9/2023 EGD +ulcer;  Rxd omeprazole - ran out of it recently;  did help with reflux.  GI recommended repeat EGD - is due for that    Sees ortho for knee pains.   Uses Mobic prn       Hx BESSIE- used to use CPAP but not recently?  +snoring/apnea/fatigue    Review of Systems  ROS as stated in HPI     Objective   /82   Pulse 80   Temp 36.8 °C (98.3 °F)   Ht 1.715 m (5' 7.5\")   Wt 106 kg (234 lb 9.6 oz)   BMI 36.20 kg/m²     Physical Exam  Constitutional: A&O; NAD  HEENT: conjunctiva normal. EOMI; PERRLA; lids normal; TM's clear;   Neck: supple; No lymphadenopathy   Heart: RRR     Lungs: CTA bilateral   Abd: Soft, Nontender, Nondistended  Ext:  No edema;    Neuro: No gross neuro deficits     Psych: Judgment, orientation, mood, affect, insight wnl   Assessment/Plan   Problem List Items Addressed This Visit             ICD-10-CM    Gastroesophageal reflux disease K21.9    Relevant Medications    omeprazole (PriLOSEC) " 20 mg DR capsule    BESSIE (obstructive sleep apnea) G47.33    Relevant Orders    Referral to Adult Sleep Medicine     Other Visit Diagnoses         Codes    Healthcare maintenance    -  Primary Z00.00          Reviewed recent labs  9/2023 colonoscopy fu 5 yrs GI [facility]  Tdap 2021  defers flu shot  Shingrix recommended-info given   healthy lifestyle-diet, exercise, wt loss  rx refills sent   Fu with sleep med re: BESSIE  Fu with onco  Rx refill omeprazole; fu with GI for repeat EGD  Fu 1 yr or sooner if needed

## 2024-06-10 ENCOUNTER — OFFICE VISIT (OUTPATIENT)
Dept: PRIMARY CARE | Facility: CLINIC | Age: 49
End: 2024-06-10
Payer: COMMERCIAL

## 2024-06-10 VITALS
TEMPERATURE: 98.3 F | BODY MASS INDEX: 35.55 KG/M2 | DIASTOLIC BLOOD PRESSURE: 82 MMHG | SYSTOLIC BLOOD PRESSURE: 125 MMHG | HEART RATE: 80 BPM | HEIGHT: 68 IN | WEIGHT: 234.6 LBS

## 2024-06-10 DIAGNOSIS — G47.33 OSA (OBSTRUCTIVE SLEEP APNEA): ICD-10-CM

## 2024-06-10 DIAGNOSIS — K21.9 GASTROESOPHAGEAL REFLUX DISEASE, UNSPECIFIED WHETHER ESOPHAGITIS PRESENT: ICD-10-CM

## 2024-06-10 DIAGNOSIS — Z00.00 HEALTHCARE MAINTENANCE: Primary | ICD-10-CM

## 2024-06-10 PROCEDURE — 99396 PREV VISIT EST AGE 40-64: CPT | Performed by: FAMILY MEDICINE

## 2024-06-10 PROCEDURE — 1036F TOBACCO NON-USER: CPT | Performed by: FAMILY MEDICINE

## 2024-06-10 PROCEDURE — 3074F SYST BP LT 130 MM HG: CPT | Performed by: FAMILY MEDICINE

## 2024-06-10 PROCEDURE — 3079F DIAST BP 80-89 MM HG: CPT | Performed by: FAMILY MEDICINE

## 2024-06-10 RX ORDER — OMEPRAZOLE 20 MG/1
20 CAPSULE, DELAYED RELEASE ORAL
Qty: 30 CAPSULE | Refills: 3 | Status: SHIPPED | OUTPATIENT
Start: 2024-06-10

## 2024-07-12 ENCOUNTER — HOSPITAL ENCOUNTER (OUTPATIENT)
Dept: RADIATION ONCOLOGY | Facility: CLINIC | Age: 49
Setting detail: RADIATION/ONCOLOGY SERIES
Discharge: HOME | End: 2024-07-12
Payer: COMMERCIAL

## 2024-07-12 VITALS
RESPIRATION RATE: 18 BRPM | WEIGHT: 239.2 LBS | TEMPERATURE: 97.9 F | OXYGEN SATURATION: 95 % | SYSTOLIC BLOOD PRESSURE: 126 MMHG | BODY MASS INDEX: 36.91 KG/M2 | HEART RATE: 83 BPM | DIASTOLIC BLOOD PRESSURE: 76 MMHG

## 2024-07-12 DIAGNOSIS — C81.72 OTHER CLASSICAL HODGKIN LYMPHOMA OF INTRATHORACIC LYMPH NODES (MULTI): Primary | ICD-10-CM

## 2024-07-12 PROCEDURE — 99213 OFFICE O/P EST LOW 20 MIN: CPT | Performed by: STUDENT IN AN ORGANIZED HEALTH CARE EDUCATION/TRAINING PROGRAM

## 2024-07-12 ASSESSMENT — ENCOUNTER SYMPTOMS
GASTROINTESTINAL NEGATIVE: 1
RESPIRATORY NEGATIVE: 1
NEUROLOGICAL NEGATIVE: 1
ENDOCRINE NEGATIVE: 1
CARDIOVASCULAR NEGATIVE: 1
EYES NEGATIVE: 1
PSYCHIATRIC NEGATIVE: 1
CONSTITUTIONAL NEGATIVE: 1
MUSCULOSKELETAL NEGATIVE: 1
HEMATOLOGIC/LYMPHATIC NEGATIVE: 1

## 2024-07-12 ASSESSMENT — PAIN SCALES - GENERAL: PAINLEVEL: 0-NO PAIN

## 2024-07-12 NOTE — PROGRESS NOTES
Radiation Oncology Follow-Up    Patient Name:  Edward Wolfe  MRN:  [id number]  :  1975    Primary Care Provider: Nellie Harrison DO  Care Team: Patient Care Team:  Nellie Harrison DO as PCP - General  Nellie Harrison DO as PCP - [facility]CO PCP  James Rader MD as Consulting Physician (Hematology and Oncology)  Abelino Flores MD as Consulting Physician (Hematology and Oncology)    Date of Service: 2024     SUBJECTIVE  History of Present Illness:   Edward Wolfe is a 49 y.o. male with Stage IIA classic Hodgkin lymphoma of the mediastinum and right SCV, ESR not known at diagnosis but otherwise considered favorable, s/p ABVD x2 with a Deauville 2 response.  S/p ISRT 20 Gy in 10 fractions completed 24.   Returning today for follow-up.  He states he has been doing well in recent months with no new complaints.  He denies shortness of breath, cough, chest pains, neck stiffness.  He denies new lumps in the neck, armpits, or groin.  He states he has medical oncology follow-up with tentative plans for posttreatment PET/CT in the coming months.         Treatment Rendered:   IMRT: Not Applicable Mediastinum (Resolved)    Treatment Period Technique Fraction Dose Fractions Total Dose   Course 1 4/3/2024-2024  (days elapsed: 13)         RtSCVThorax 4/3/2024-2024 IMRT 200 / 200 cGy 10 / 10 2000 / 2,000 cGy       Review of Systems:   Review of Systems - Oncology  - please see nursing note    Performance Status:   The Karnofsky performance scale today is 100, Fully active, able to carry on all pre-disease performed without restriction (ECOG equivalent 0).      OBJECTIVE  /76   Pulse 83   Temp 36.6 °C (97.9 °F)   Resp 18   Wt 109 kg (239 lb 3.2 oz)   SpO2 95%   BMI 36.91 kg/m²    Physical Exam  Vitals reviewed.   Constitutional:       General: He is not in acute distress.  HENT:      Head: Normocephalic and atraumatic.   Cardiovascular:      Rate and Rhythm: Normal rate and regular rhythm.       Heart sounds: No murmur heard.  Pulmonary:      Effort: Pulmonary effort is normal. No respiratory distress.      Breath sounds: No wheezing.   Abdominal:      General: There is no distension.   Lymphadenopathy:      Cervical: No cervical adenopathy.   Skin:     Findings: No erythema.   Neurological:      Mental Status: He is alert and oriented to person, place, and time.   Psychiatric:         Mood and Affect: Mood normal.         Behavior: Behavior normal.           ASSESSMENT:  Edward Wolfe is a 49 y.o. male with Stage IIA classic Hodgkin lymphoma of the mediastinum and right SCV, ESR not known at diagnosis but otherwise considered favorable, s/p ABVD x2 with a Deauville 2 response.  S/p ISRT 20 Gy in 10 fractions completed 4/16/24.    PLAN:   He is doing very well after his radiotherapy course.  No evidence of developing subacute toxicity.  He has continued medical oncology follow-up for surveillance with tentative plans for posttreatment PET/CT.  He will follow-up in my clinic in 6 months or as needed.    NCCN Guidelines were applicable to guide this patients treatment plan.    Thank you for allowing me to participate in this patient's care.    Dayton Canada MD  Department of Radiation Oncology  [facility]    Portions of this note were generated using voice recognition software, and may be subject to transcription errors.

## 2024-07-24 DIAGNOSIS — F41.9 ANXIETY DISORDER, UNSPECIFIED: ICD-10-CM

## 2024-07-24 DIAGNOSIS — E78.00 PURE HYPERCHOLESTEROLEMIA, UNSPECIFIED: ICD-10-CM

## 2024-07-24 DIAGNOSIS — E78.1 PURE HYPERGLYCERIDEMIA: ICD-10-CM

## 2024-07-24 DIAGNOSIS — I10 ESSENTIAL (PRIMARY) HYPERTENSION: ICD-10-CM

## 2024-07-24 RX ORDER — ATORVASTATIN CALCIUM 40 MG/1
TABLET, FILM COATED ORAL
Qty: 90 TABLET | Refills: 1 | Status: SHIPPED | OUTPATIENT
Start: 2024-07-24

## 2024-07-24 RX ORDER — LISINOPRIL 10 MG/1
TABLET ORAL
Qty: 90 TABLET | Refills: 1 | Status: SHIPPED | OUTPATIENT
Start: 2024-07-24

## 2024-07-24 RX ORDER — GEMFIBROZIL 600 MG/1
TABLET, FILM COATED ORAL
Qty: 90 TABLET | Refills: 1 | Status: SHIPPED | OUTPATIENT
Start: 2024-07-24

## 2024-07-24 RX ORDER — SERTRALINE HYDROCHLORIDE 100 MG/1
150 TABLET, FILM COATED ORAL DAILY
Qty: 135 TABLET | Refills: 1 | Status: SHIPPED | OUTPATIENT
Start: 2024-07-24

## 2024-07-31 DIAGNOSIS — D64.9 ANEMIA, UNSPECIFIED TYPE: ICD-10-CM

## 2024-07-31 DIAGNOSIS — C81.92 HODGKIN LYMPHOMA OF INTRATHORACIC LYMPH NODES, UNSPECIFIED HODGKIN LYMPHOMA TYPE (MULTI): ICD-10-CM

## 2024-08-01 ENCOUNTER — LAB (OUTPATIENT)
Dept: LAB | Facility: LAB | Age: 49
End: 2024-08-01
Payer: COMMERCIAL

## 2024-08-01 DIAGNOSIS — C81.12 NODULAR SCLEROSIS HODGKIN LYMPHOMA OF INTRATHORACIC LYMPH NODES (MULTI): ICD-10-CM

## 2024-08-01 DIAGNOSIS — C81.92 HODGKIN LYMPHOMA OF INTRATHORACIC LYMPH NODES, UNSPECIFIED HODGKIN LYMPHOMA TYPE (MULTI): ICD-10-CM

## 2024-08-01 DIAGNOSIS — D64.9 ANEMIA, UNSPECIFIED TYPE: ICD-10-CM

## 2024-08-01 LAB
ALBUMIN SERPL BCP-MCNC: 5 G/DL (ref 3.4–5)
ALP SERPL-CCNC: 60 U/L (ref 33–120)
ALT SERPL W P-5'-P-CCNC: 25 U/L (ref 10–52)
ANION GAP SERPL CALC-SCNC: 16 MMOL/L (ref 10–20)
AST SERPL W P-5'-P-CCNC: 23 U/L (ref 9–39)
BASOPHILS # BLD AUTO: 0.02 X10*3/UL (ref 0–0.1)
BASOPHILS NFR BLD AUTO: 0.4 %
BILIRUB SERPL-MCNC: 0.4 MG/DL (ref 0–1.2)
BUN SERPL-MCNC: 19 MG/DL (ref 6–23)
CALCIUM SERPL-MCNC: 10 MG/DL (ref 8.6–10.6)
CHLORIDE SERPL-SCNC: 103 MMOL/L (ref 98–107)
CO2 SERPL-SCNC: 23 MMOL/L (ref 21–32)
CREAT SERPL-MCNC: 0.95 MG/DL (ref 0.5–1.3)
EGFRCR SERPLBLD CKD-EPI 2021: >90 ML/MIN/1.73M*2
EOSINOPHIL # BLD AUTO: 0.13 X10*3/UL (ref 0–0.7)
EOSINOPHIL NFR BLD AUTO: 2.5 %
ERYTHROCYTE [DISTWIDTH] IN BLOOD BY AUTOMATED COUNT: 12.7 % (ref 11.5–14.5)
GLUCOSE SERPL-MCNC: 115 MG/DL (ref 74–99)
HCT VFR BLD AUTO: 40 % (ref 41–52)
HGB BLD-MCNC: 13.5 G/DL (ref 13.5–17.5)
IMM GRANULOCYTES # BLD AUTO: 0.03 X10*3/UL (ref 0–0.7)
IMM GRANULOCYTES NFR BLD AUTO: 0.6 % (ref 0–0.9)
IRON SATN MFR SERPL: 31 % (ref 25–45)
IRON SERPL-MCNC: 130 UG/DL (ref 35–150)
LYMPHOCYTES # BLD AUTO: 0.83 X10*3/UL (ref 1.2–4.8)
LYMPHOCYTES NFR BLD AUTO: 15.7 %
MCH RBC QN AUTO: 29.7 PG (ref 26–34)
MCHC RBC AUTO-ENTMCNC: 33.8 G/DL (ref 32–36)
MCV RBC AUTO: 88 FL (ref 80–100)
MONOCYTES # BLD AUTO: 0.62 X10*3/UL (ref 0.1–1)
MONOCYTES NFR BLD AUTO: 11.7 %
NEUTROPHILS # BLD AUTO: 3.65 X10*3/UL (ref 1.2–7.7)
NEUTROPHILS NFR BLD AUTO: 69.1 %
NRBC BLD-RTO: 0 /100 WBCS (ref 0–0)
PLATELET # BLD AUTO: 376 X10*3/UL (ref 150–450)
POTASSIUM SERPL-SCNC: 4.6 MMOL/L (ref 3.5–5.3)
PROT SERPL-MCNC: 7.4 G/DL (ref 6.4–8.2)
RBC # BLD AUTO: 4.54 X10*6/UL (ref 4.5–5.9)
SODIUM SERPL-SCNC: 137 MMOL/L (ref 136–145)
TIBC SERPL-MCNC: 413 UG/DL (ref 240–445)
UIBC SERPL-MCNC: 283 UG/DL (ref 110–370)
WBC # BLD AUTO: 5.3 X10*3/UL (ref 4.4–11.3)

## 2024-08-01 PROCEDURE — 85025 COMPLETE CBC W/AUTO DIFF WBC: CPT

## 2024-08-01 PROCEDURE — 83550 IRON BINDING TEST: CPT

## 2024-08-01 PROCEDURE — 36415 COLL VENOUS BLD VENIPUNCTURE: CPT

## 2024-08-01 PROCEDURE — 80053 COMPREHEN METABOLIC PANEL: CPT

## 2024-08-01 PROCEDURE — 83540 ASSAY OF IRON: CPT

## 2024-08-06 ENCOUNTER — OFFICE VISIT (OUTPATIENT)
Dept: HEMATOLOGY/ONCOLOGY | Facility: CLINIC | Age: 49
End: 2024-08-06
Payer: COMMERCIAL

## 2024-08-06 VITALS
HEART RATE: 77 BPM | RESPIRATION RATE: 18 BRPM | WEIGHT: 238.76 LBS | SYSTOLIC BLOOD PRESSURE: 141 MMHG | OXYGEN SATURATION: 97 % | DIASTOLIC BLOOD PRESSURE: 93 MMHG | TEMPERATURE: 97.5 F | BODY MASS INDEX: 36.84 KG/M2

## 2024-08-06 DIAGNOSIS — C81.72 OTHER CLASSICAL HODGKIN LYMPHOMA OF INTRATHORACIC LYMPH NODES (MULTI): ICD-10-CM

## 2024-08-06 PROCEDURE — 99214 OFFICE O/P EST MOD 30 MIN: CPT | Performed by: INTERNAL MEDICINE

## 2024-08-06 PROCEDURE — 1036F TOBACCO NON-USER: CPT | Performed by: INTERNAL MEDICINE

## 2024-08-06 PROCEDURE — 3077F SYST BP >= 140 MM HG: CPT | Performed by: INTERNAL MEDICINE

## 2024-08-06 PROCEDURE — 3080F DIAST BP >= 90 MM HG: CPT | Performed by: INTERNAL MEDICINE

## 2024-08-06 ASSESSMENT — PAIN SCALES - GENERAL: PAINLEVEL: 0-NO PAIN

## 2024-08-20 ENCOUNTER — HOSPITAL ENCOUNTER (OUTPATIENT)
Dept: RADIOLOGY | Facility: CLINIC | Age: 49
Discharge: HOME | End: 2024-08-20
Payer: COMMERCIAL

## 2024-08-20 DIAGNOSIS — C81.72 OTHER CLASSICAL HODGKIN LYMPHOMA OF INTRATHORACIC LYMPH NODES (MULTI): ICD-10-CM

## 2024-08-20 PROCEDURE — 3430000001 HC RX 343 DIAGNOSTIC RADIOPHARMACEUTICALS: Performed by: INTERNAL MEDICINE

## 2024-08-20 PROCEDURE — 78815 PET IMAGE W/CT SKULL-THIGH: CPT | Mod: PET TUMOR SUBSQ TX STRATEGY | Performed by: RADIOLOGY

## 2024-08-20 PROCEDURE — 78815 PET IMAGE W/CT SKULL-THIGH: CPT | Mod: PS

## 2024-08-20 PROCEDURE — A9552 F18 FDG: HCPCS | Performed by: INTERNAL MEDICINE

## 2024-08-20 RX ORDER — FLUDEOXYGLUCOSE F 18 200 MCI/ML
12.3 INJECTION, SOLUTION INTRAVENOUS
Status: COMPLETED | OUTPATIENT
Start: 2024-08-20 | End: 2024-08-20

## 2024-11-04 ENCOUNTER — LAB (OUTPATIENT)
Dept: LAB | Facility: LAB | Age: 49
End: 2024-11-04
Payer: COMMERCIAL

## 2024-11-04 DIAGNOSIS — C81.12 NODULAR SCLEROSIS HODGKIN LYMPHOMA OF INTRATHORACIC LYMPH NODES (MULTI): ICD-10-CM

## 2024-11-04 LAB
ALBUMIN SERPL BCP-MCNC: 4.7 G/DL (ref 3.4–5)
ALP SERPL-CCNC: 61 U/L (ref 33–120)
ALT SERPL W P-5'-P-CCNC: 29 U/L (ref 10–52)
ANION GAP SERPL CALC-SCNC: 11 MMOL/L (ref 10–20)
AST SERPL W P-5'-P-CCNC: 21 U/L (ref 9–39)
BASOPHILS # BLD AUTO: 0.02 X10*3/UL (ref 0–0.1)
BASOPHILS NFR BLD AUTO: 0.4 %
BILIRUB SERPL-MCNC: 0.3 MG/DL (ref 0–1.2)
BUN SERPL-MCNC: 16 MG/DL (ref 6–23)
CALCIUM SERPL-MCNC: 9.4 MG/DL (ref 8.6–10.3)
CHLORIDE SERPL-SCNC: 104 MMOL/L (ref 98–107)
CO2 SERPL-SCNC: 27 MMOL/L (ref 21–32)
CREAT SERPL-MCNC: 0.95 MG/DL (ref 0.5–1.3)
EGFRCR SERPLBLD CKD-EPI 2021: >90 ML/MIN/1.73M*2
EOSINOPHIL # BLD AUTO: 0.13 X10*3/UL (ref 0–0.7)
EOSINOPHIL NFR BLD AUTO: 2.3 %
ERYTHROCYTE [DISTWIDTH] IN BLOOD BY AUTOMATED COUNT: 11.7 % (ref 11.5–14.5)
GLUCOSE SERPL-MCNC: 98 MG/DL (ref 74–99)
HCT VFR BLD AUTO: 38.5 % (ref 41–52)
HGB BLD-MCNC: 13.6 G/DL (ref 13.5–17.5)
IMM GRANULOCYTES # BLD AUTO: 0.02 X10*3/UL (ref 0–0.7)
IMM GRANULOCYTES NFR BLD AUTO: 0.4 % (ref 0–0.9)
IRON SATN MFR SERPL: 31 % (ref 25–45)
IRON SERPL-MCNC: 118 UG/DL (ref 35–150)
LYMPHOCYTES # BLD AUTO: 1.07 X10*3/UL (ref 1.2–4.8)
LYMPHOCYTES NFR BLD AUTO: 19 %
MCH RBC QN AUTO: 31.3 PG (ref 26–34)
MCHC RBC AUTO-ENTMCNC: 35.3 G/DL (ref 32–36)
MCV RBC AUTO: 89 FL (ref 80–100)
MONOCYTES # BLD AUTO: 0.56 X10*3/UL (ref 0.1–1)
MONOCYTES NFR BLD AUTO: 9.9 %
NEUTROPHILS # BLD AUTO: 3.84 X10*3/UL (ref 1.2–7.7)
NEUTROPHILS NFR BLD AUTO: 68 %
NRBC BLD-RTO: 0 /100 WBCS (ref 0–0)
PLATELET # BLD AUTO: 363 X10*3/UL (ref 150–450)
POTASSIUM SERPL-SCNC: 4.1 MMOL/L (ref 3.5–5.3)
PROT SERPL-MCNC: 7.1 G/DL (ref 6.4–8.2)
RBC # BLD AUTO: 4.34 X10*6/UL (ref 4.5–5.9)
SODIUM SERPL-SCNC: 138 MMOL/L (ref 136–145)
TIBC SERPL-MCNC: 385 UG/DL (ref 240–445)
UIBC SERPL-MCNC: 267 UG/DL (ref 110–370)
WBC # BLD AUTO: 5.6 X10*3/UL (ref 4.4–11.3)

## 2024-11-04 PROCEDURE — 83540 ASSAY OF IRON: CPT

## 2024-11-04 PROCEDURE — 36415 COLL VENOUS BLD VENIPUNCTURE: CPT

## 2024-11-04 PROCEDURE — 85025 COMPLETE CBC W/AUTO DIFF WBC: CPT

## 2024-11-04 PROCEDURE — 83550 IRON BINDING TEST: CPT

## 2024-11-04 PROCEDURE — 80053 COMPREHEN METABOLIC PANEL: CPT

## 2024-11-05 ENCOUNTER — OFFICE VISIT (OUTPATIENT)
Dept: HEMATOLOGY/ONCOLOGY | Facility: CLINIC | Age: 49
End: 2024-11-05
Payer: COMMERCIAL

## 2024-11-05 VITALS
BODY MASS INDEX: 37.01 KG/M2 | HEART RATE: 106 BPM | OXYGEN SATURATION: 93 % | RESPIRATION RATE: 20 BRPM | DIASTOLIC BLOOD PRESSURE: 87 MMHG | SYSTOLIC BLOOD PRESSURE: 134 MMHG | TEMPERATURE: 99 F | WEIGHT: 239.86 LBS

## 2024-11-05 DIAGNOSIS — C81.72 OTHER CLASSICAL HODGKIN LYMPHOMA OF INTRATHORACIC LYMPH NODES: ICD-10-CM

## 2024-11-05 PROCEDURE — 3075F SYST BP GE 130 - 139MM HG: CPT | Performed by: INTERNAL MEDICINE

## 2024-11-05 PROCEDURE — 99214 OFFICE O/P EST MOD 30 MIN: CPT | Performed by: INTERNAL MEDICINE

## 2024-11-05 PROCEDURE — 3079F DIAST BP 80-89 MM HG: CPT | Performed by: INTERNAL MEDICINE

## 2024-11-05 PROCEDURE — 1036F TOBACCO NON-USER: CPT | Performed by: INTERNAL MEDICINE

## 2024-11-05 ASSESSMENT — PAIN SCALES - GENERAL: PAINLEVEL_OUTOF10: 0-NO PAIN

## 2024-11-05 NOTE — PROGRESS NOTES
Patient ID: Edward Wolfe is a 49 y.o. male.  Referring Physician: No referring provider defined for this encounter.  Primary Care Provider: Nellie Harrison DO  Cancer history:    Classical Hodgkin's lymphoma involving right paratracheal lymph node.  PET scan positive in mediastinum as well as left supraglottic.  Stage IIa  A PET scan after 2 cycles of ABVD revealed excellent response Deauville score of 2.  Total dose Adriamycin 232 mg.  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  CT scan of chest revealed interval increase in the size of an irregular mass in the anterior mediastinum with associated calcification when compared to prior CT scan examination on August 11, 2021.  Prominent mediastinal lymphadenopathy is also noted which is increased compared to prior examination in 2021.  Findings are suspicious.  An EBUS by  on November 7, 2023 revealed anterior mediastinal paratracheal lymph node mass.  Biopsy consistent with classic Hodgkin's lymphoma.  A PET scan on September 18, 2023 revealedIMPRESSION:  1. Hypermetabolic anterior mediastinal mass which is concerning for  neoplasm. Further evaluation with soft tissue biopsy can be  considered.  2. Hypermetabolic mediastinal and right supraclavicular lymph nodes  which are concerning for metastatic lymphadenopathy.  3. No evidence of distal hypermetabolic lesions concerning for  metastases. the patient was referred for further evaluation and management.    At interview on November 22, 2023 the patient and his wife narrated entire history.  The patient is asymptomatic.  Denied a history of weight loss, fevers, night sweats, chest pain, shortness of breath, nausea, vomiting, hematemesis, melena, hematochezia and  hematuria.    The patient and his wife had come for follow-up on December 26, 2023 regarding history of stage IIa classical Hodgkin's lymphoma involving left supraclavicular and right mediastinal lymph nodes.  The patient is asymptomatic.  Qvsqyb-b-[id number] was placed, color Doppler echocardiogram and pulmonary function tests were done as well.  The patient is asymptomatic.    The patient and wife had come for follow-up January 31, 2024 regarding history of stage IIa classical Hodgkin's lymphoma involving left supraclavicular and right mediastinal lymph nodes.  The patient was placed on a chemotherapy regimen using ABVD.  He has received and tolerated cycle 1 day 1 and 14 without any problems.    The patient   had come for follow-up on November 5, 2024 regarding history of stage IIa classical Hodgkin's lymphoma involving left supraclavicular and right mediastinal lymph nodes.  The patient received 2 cycles of chemotherapy using ABVD.  A PET scan revealed excellent response with Deauville score of 2, status post radiation therapy.  The patient is feeling much better.    Past medical history:    Hypertension, hypercholesterolemia.    Past surgical history:    Right shoulder and right hip surgery.    Colonoscopy:    September 2023.    Family history:    Reviewed but not relevant.    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    Review of Systems   All other systems reviewed and are negative.       Objective   BSA: 2.28 meters squared  /87   Pulse 106   Temp 37.2 °C (99 °F)   Resp 20   Wt 109 kg (239 lb 13.8 oz)   SpO2 93%   BMI 37.01 kg/m²     Family History   Problem Relation Name Age of Onset    Lung cancer Father       Oncology History   Hodgkin lymphoma of intrathoracic lymph nodes (Multi)   11/22/2023 Initial Diagnosis    Hodgkin lymphoma of intrathoracic lymph nodes (CMS/HCC)      1/5/2024 -  Chemotherapy    ABVD (DOXOrubicin / Bleomycin / VinBLAStine / Dacarbazine), 28 Day Cycles     3/12/2024 Cancer Staged    Staging form: Hodgkin and Non-Hodgkin Lymphoma, AJCC 8th Edition, Clinical stage from 3/12/2024: Stage II (Hodgkin lymphoma, A - Asymptomatic) - Signed by Dayton Canada MD on 3/12/2024         Edward Wolfe  reports that he has never smoked. He has never been exposed to tobacco smoke. He has never used smokeless tobacco.  He  reports current alcohol use.  He  reports no history of drug use.    Physical Exam  Constitutional:       Appearance: Normal appearance.   HENT:      Head: Normocephalic and atraumatic.      Nose: Nose normal.      Mouth/Throat:      Mouth: Mucous membranes are moist.      Pharynx: Oropharynx is clear.   Eyes:      Extraocular Movements: Extraocular movements intact.      Conjunctiva/sclera: Conjunctivae normal.      Pupils: Pupils are equal, round, and reactive to light.   Cardiovascular:      Rate and Rhythm: Normal rate and regular rhythm.   Pulmonary:      Effort: Pulmonary effort is normal.      Breath sounds: Normal breath sounds.   Abdominal:      General: Abdomen is flat. Bowel sounds are normal.      Palpations: Abdomen is soft.   Musculoskeletal:         General: Normal range of motion.      Cervical back: Normal range of motion and neck supple.   Neurological:      General: No focal deficit present.      Mental Status: He is alert and oriented to person, place, and time. Mental status is at baseline.   Psychiatric:         Mood and Affect: Mood normal.         Behavior: Behavior normal.         Thought Content: Thought content normal.         Judgment: Judgment normal.         Performance Status:  Asymptomatic    Assessment/Plan    The patient is a 48-year-old man with past medical history of hypertension, hypercholesterolemia and a newly diagnosed classical Hodgkin's lymphoma involving mediastinum as well as possible right supraclavicular lymph  node.  Physical examination was within normal limits.  There was no palpable cervical axillary inguinal lymphadenopathy.  I had a detailed discussion with the patient explained to him that it would be prudent to obtain more staging information such as bone marrow aspiration biopsy and also consider color Doppler echocardiogram, pulmonary function test as well as a port placement.  The patient and his wife understood appreciated all the details provided and were grateful.    Patient and his wife had come for follow-up on December 26, 2023 regarding history of classical Hodgkin's lymphoma involving left supra clavicular and mediastinal/hilar lymph nodes, stage IIa.  Color Doppler echocardiogram revealed ejection fraction 65% pulmonary function tests in reference range, Fjzthz-n-Dces was placed.  And a detailed discussion with the patient explained to him that if agreeable would recommend chemotherapy using Adriamycin, bleomycin, vinblastine, dacarbazine every 2 weeks x 4 cycles followed by PET scan and then radiation oncology evaluation.  Other option to do nothing.  After thinking through all the options the patient is agreeable to start chemotherapy.  Effect side effects explained.  Printed material from [email or web address] provided.  Informed consent obtained.  The patient to start chemotherapy first week of January 2024.  The patient and wife had come for follow-up January 31, 2024 regarding history of stage IIa classical Hodgkin's lymphoma involving left supraclavicular and right mediastinal lymph nodes.  The patient was placed on a chemotherapy regimen using ABVD.  He has received and tolerated cycle 1 day 1 and 14 without any problems.  The patient to receive cycle 2 day 1 on February 2, 2024.  I have recommended restaging PET scan after completion of cycle 2 and return in 4 weeks.    The patient   had come for follow-up on November 5, 2024 regarding history of stage IIa classical Hodgkin's lymphoma involving left  supraclavicular and right mediastinal lymph nodes.  The patient received 2 cycles of chemotherapy using ABVD.  A PET scan revealed excellent response with Deauville score of 2.  The patient received radiation therapy, 20 GY in 10 fractions, completed on April 16, 2024 the patient is feeling much better.  A PET scan on August 20, 2024 revealed No FDG avid gary or extranodal disease. There has been continued  improvement/resolution of previously seen mild hypermetabolic  activity in a right paratracheal lymph node. Deauville score 1.   Return in 3 months.      Thank you for allowing me to participate in the care of your patient if you have any questions please feel free to call me      Diagnoses and all orders for this visit:  Hodgkin lymphoma of intrathoracic lymph nodes, unspecified Hodgkin lymphoma type (CMS/HCC)  -     Bone Marrow Evaluation  -     Onco-Echo Complete (Strain And 3D); Future  -     Pulmonary function testing; Future  Lymph node enlargement  -     Referral to Malignant Hematology (Hematology / Oncology)  -     Bone Marrow Evaluation  -     Onco-Echo Complete (Strain And 3D); Future  -     Pulmonary function testing; Future  Other classical Hodgkin lymphoma of lymph nodes of multiple regions (CMS/HCC)  -     Bone Marrow Evaluation  -     Onco-Echo Complete (Strain And 3D); Future  -     Pulmonary function testing; Future           Abelino Flores MD

## 2025-01-15 DIAGNOSIS — E78.1 PURE HYPERGLYCERIDEMIA: ICD-10-CM

## 2025-01-15 DIAGNOSIS — I10 ESSENTIAL (PRIMARY) HYPERTENSION: ICD-10-CM

## 2025-01-15 DIAGNOSIS — E78.00 PURE HYPERCHOLESTEROLEMIA, UNSPECIFIED: ICD-10-CM

## 2025-01-15 DIAGNOSIS — F41.9 ANXIETY DISORDER, UNSPECIFIED: ICD-10-CM

## 2025-01-15 RX ORDER — ATORVASTATIN CALCIUM 40 MG/1
TABLET, FILM COATED ORAL
Qty: 90 TABLET | Refills: 1 | Status: SHIPPED | OUTPATIENT
Start: 2025-01-15

## 2025-01-15 RX ORDER — GEMFIBROZIL 600 MG/1
TABLET, FILM COATED ORAL
Qty: 90 TABLET | Refills: 1 | Status: SHIPPED | OUTPATIENT
Start: 2025-01-15

## 2025-01-15 RX ORDER — LISINOPRIL 10 MG/1
TABLET ORAL
Qty: 90 TABLET | Refills: 1 | Status: SHIPPED | OUTPATIENT
Start: 2025-01-15

## 2025-01-15 RX ORDER — SERTRALINE HYDROCHLORIDE 100 MG/1
150 TABLET, FILM COATED ORAL DAILY
Qty: 135 TABLET | Refills: 1 | Status: SHIPPED | OUTPATIENT
Start: 2025-01-15

## 2025-01-17 ENCOUNTER — HOSPITAL ENCOUNTER (OUTPATIENT)
Dept: RADIATION ONCOLOGY | Facility: CLINIC | Age: 50
Setting detail: RADIATION/ONCOLOGY SERIES
Discharge: HOME | End: 2025-01-17
Payer: COMMERCIAL

## 2025-01-17 VITALS
TEMPERATURE: 98.4 F | SYSTOLIC BLOOD PRESSURE: 150 MMHG | OXYGEN SATURATION: 95 % | BODY MASS INDEX: 37.68 KG/M2 | HEART RATE: 70 BPM | RESPIRATION RATE: 18 BRPM | WEIGHT: 244.2 LBS | DIASTOLIC BLOOD PRESSURE: 93 MMHG

## 2025-01-17 DIAGNOSIS — C81.72 OTHER CLASSICAL HODGKIN LYMPHOMA OF INTRATHORACIC LYMPH NODES: Primary | ICD-10-CM

## 2025-01-17 PROCEDURE — 99213 OFFICE O/P EST LOW 20 MIN: CPT | Performed by: STUDENT IN AN ORGANIZED HEALTH CARE EDUCATION/TRAINING PROGRAM

## 2025-01-17 ASSESSMENT — ENCOUNTER SYMPTOMS
NEUROLOGICAL NEGATIVE: 1
GASTROINTESTINAL NEGATIVE: 1
EYES NEGATIVE: 1
ENDOCRINE NEGATIVE: 1
PSYCHIATRIC NEGATIVE: 1
HEMATOLOGIC/LYMPHATIC NEGATIVE: 1
RESPIRATORY NEGATIVE: 1
CONSTITUTIONAL NEGATIVE: 1
CARDIOVASCULAR NEGATIVE: 1
MUSCULOSKELETAL NEGATIVE: 1

## 2025-01-17 ASSESSMENT — PAIN SCALES - GENERAL: PAINLEVEL_OUTOF10: 0-NO PAIN

## 2025-01-17 NOTE — PROGRESS NOTES
Radiation Oncology Follow-Up    Patient Name:  Edward Wolfe  MRN:  [id number]  :  1975    Primary Care Provider: Nellie Harrison DO  Care Team: Patient Care Team:  Nellie Harrison DO as PCP - General  Nellie Harrison DO as PCP - [facility] PCP  James Rader MD as Consulting Physician (Hematology and Oncology)  Abelino Flores MD as Consulting Physician (Hematology and Oncology)    Date of Service: 2025     SUBJECTIVE  History of Present Illness:   Edward Wolfe is a 49 y.o. male with Stage IIA classic Hodgkin lymphoma of the mediastinum and right SCV, ESR not known at diagnosis but otherwise considered favorable, s/p ABVD x2 with a Deauville 2 response. S/p ISRT 20 Gy in 10 fractions completed 24.  Since last follow-up he underwent a posttreatment PET/CT on 2024.  This showed a Deauville 1 complete response with no FDG avid gary or extranodal disease.   He states he is doing well, in his usual state of health.  He denies fevers or chills.  No concerning or new lumps.  He denies shortness of breath or dyspnea on exertion.  No chest pains or heart palpitations.  He was diagnosed with GERD, managed with omeprazole.    Treatment Rendered:   IMRT: Not Applicable Mediastinum (Resolved)    Treatment Period Technique Fraction Dose Fractions Total Dose   Course 1 4/3/2024-2024  (days elapsed: 13)         RtSCVThorax 4/3/2024-2024 IMRT 200 / 200 cGy 10 / 10 2000 / 2,000 cGy       Review of Systems:   Review of Systems - Oncology  - please see nursing note    Performance Status:   The Karnofsky performance scale today is 100, Fully active, able to carry on all pre-disease performed without restriction (ECOG equivalent 0).      OBJECTIVE  BP (!) 150/93   Pulse 70   Temp 36.9 °C (98.4 °F)   Resp 18   Wt 111 kg (244 lb 3.2 oz)   SpO2 95%   BMI 37.68 kg/m²    Physical Exam  Vitals reviewed.   Constitutional:       General: He is not in acute distress.  HENT:      Head: Normocephalic and  atraumatic.   Cardiovascular:      Rate and Rhythm: Normal rate and regular rhythm.      Heart sounds: No murmur heard.  Pulmonary:      Effort: Pulmonary effort is normal. No respiratory distress.      Breath sounds: No wheezing.   Musculoskeletal:      Cervical back: Neck supple.   Lymphadenopathy:      Cervical: No cervical adenopathy.   Skin:     Findings: No rash.   Neurological:      Mental Status: He is alert and oriented to person, place, and time.   Psychiatric:         Mood and Affect: Mood normal.         Behavior: Behavior normal.           ASSESSMENT:  Edward Wolfe is a 49 y.o. male with Stage IIA classic Hodgkin lymphoma of the mediastinum and right SCV, ESR not known at diagnosis but otherwise considered favorable, s/p ABVD x2 with a Deauville 2 response. S/p ISRT 20 Gy in 10 fractions completed 4/16/24.  Posttreatment PET/CT showed Deauville 1 complete response.    PLAN:   He has had a complete response to chemotherapy and involved site radiotherapy.  No clinical evidence of disease recurrence.  He is doing very well after his radiotherapy course without ongoing toxicity from treatment.  He has continued follow-up with medical oncology for surveillance.  He will follow-up in our clinic as needed, and is instructed to reach out at any time with questions regarding his prior treatment, or with any new concerns.    NCCN Guidelines were applicable to guide this patients treatment plan.    Thank you for allowing me to participate in this patient's care.    Dayton Canada MD  Department of Radiation Oncology  [facility]    Portions of this note were generated using voice recognition software, and may be subject to transcription errors.

## 2025-02-01 LAB
IRON SATN MFR SERPL: 32 % (CALC) (ref 20–48)
IRON SERPL-MCNC: 129 MCG/DL (ref 50–180)
TIBC SERPL-MCNC: 409 MCG/DL (CALC) (ref 250–425)

## 2025-02-03 ENCOUNTER — OFFICE VISIT (OUTPATIENT)
Dept: HEMATOLOGY/ONCOLOGY | Facility: CLINIC | Age: 50
End: 2025-02-03
Payer: COMMERCIAL

## 2025-02-03 VITALS
HEIGHT: 68 IN | WEIGHT: 244.71 LBS | HEART RATE: 78 BPM | OXYGEN SATURATION: 96 % | DIASTOLIC BLOOD PRESSURE: 92 MMHG | BODY MASS INDEX: 37.09 KG/M2 | TEMPERATURE: 97.2 F | SYSTOLIC BLOOD PRESSURE: 153 MMHG | RESPIRATION RATE: 18 BRPM

## 2025-02-03 DIAGNOSIS — C81.72 OTHER CLASSICAL HODGKIN LYMPHOMA OF INTRATHORACIC LYMPH NODES: ICD-10-CM

## 2025-02-03 PROCEDURE — 99214 OFFICE O/P EST MOD 30 MIN: CPT | Performed by: INTERNAL MEDICINE

## 2025-02-03 PROCEDURE — 3008F BODY MASS INDEX DOCD: CPT | Performed by: INTERNAL MEDICINE

## 2025-02-03 PROCEDURE — 3080F DIAST BP >= 90 MM HG: CPT | Performed by: INTERNAL MEDICINE

## 2025-02-03 PROCEDURE — 3077F SYST BP >= 140 MM HG: CPT | Performed by: INTERNAL MEDICINE

## 2025-02-03 ASSESSMENT — PAIN SCALES - GENERAL: PAINLEVEL_OUTOF10: 0-NO PAIN

## 2025-02-03 NOTE — PROGRESS NOTES
Patient ID: Edward Wolfe is a 49 y.o. male.  Referring Physician: Abelino Flores MD  [address] [facility] [address]  [place] [address]  Primary Care Provider: Nellie Harrison DO  Cancer history:    Classical Hodgkin's lymphoma involving right paratracheal lymph node.  PET scan positive in mediastinum as well as left supraglottic.  Stage IIa  A PET scan after 2 cycles of ABVD revealed excellent response Deauville score of 2.  Total dose Adriamycin 232 mg.  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  CT scan of chest revealed interval increase in the size of an irregular mass in the anterior mediastinum with associated calcification when compared to prior CT scan examination on August 11, 2021.  Prominent mediastinal lymphadenopathy is also noted which is increased compared to prior examination in 2021.  Findings are suspicious.  An EBUS by  on November 7, 2023 revealed anterior mediastinal paratracheal lymph node mass.  Biopsy consistent with classic Hodgkin's lymphoma.  A PET scan on September 18, 2023 revealedIMPRESSION:  1. Hypermetabolic anterior mediastinal mass which is concerning for  neoplasm. Further evaluation with soft tissue biopsy can be  considered.  2. Hypermetabolic mediastinal and right supraclavicular lymph nodes  which are concerning for metastatic lymphadenopathy.  3. No evidence of distal hypermetabolic lesions concerning for  metastases. the patient was referred for further evaluation and management.    At interview on November 22, 2023 the patient and his wife narrated entire history.  The patient is asymptomatic.  Denied a history of weight loss, fevers, night sweats, chest pain, shortness of breath, nausea, vomiting,  hematemesis, melena, hematochezia and hematuria.    The patient and his wife had come for follow-up on December 26, 2023 regarding history of stage IIa classical Hodgkin's lymphoma involving left supraclavicular and right mediastinal lymph nodes.  The patient is asymptomatic.  Djdtdz-g-[id number] was placed, color Doppler echocardiogram and pulmonary function tests were done as well.  The patient is asymptomatic.    The patient and wife had come for follow-up January 31, 2024 regarding history of stage IIa classical Hodgkin's lymphoma involving left supraclavicular and right mediastinal lymph nodes.  The patient was placed on a chemotherapy regimen using ABVD.  He has received and tolerated cycle 1 day 1 and 14 without any problems.    The patient   had come for follow-up on November 5, 2024 regarding history of stage IIa classical Hodgkin's lymphoma involving left supraclavicular and right mediastinal lymph nodes.  The patient received 2 cycles of chemotherapy using ABVD.  A PET scan revealed excellent response with Deauville score of 2, status post radiation therapy.  The patient is feeling much better.    The patient   had come for follow-up on February 3, 2025 regarding history of stage IIa classical Hodgkin's lymphoma involving left supraclavicular and right mediastinal lymph nodes.  The patient received 2 cycles of chemotherapy using ABVD.  A PET scan revealed excellent response with Deauville score of 2, status post radiation therapy.  The patient is feeling much better.    Past medical history:    Hypertension, hypercholesterolemia.    Past surgical history:    Right shoulder and right hip surgery.    Colonoscopy:    September 2023.    Family history:    Reviewed but not relevant.    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    Review of Systems   All other systems reviewed and are  "negative.       Objective   BSA: 2.3 meters squared  BP (!) 153/92   Pulse 78   Temp 36.2 °C (97.2 °F)   Resp 18   Ht (S) 1.715 m (5' 7.52\")   Wt 111 kg (244 lb 11.4 oz)   SpO2 96%   BMI 37.74 kg/m²     Family History   Problem Relation Name Age of Onset    Lung cancer Father       Oncology History   Hodgkin lymphoma of intrathoracic lymph nodes (Multi)   11/22/2023 Initial Diagnosis    Hodgkin lymphoma of intrathoracic lymph nodes (CMS/HCC)     1/5/2024 -  Chemotherapy    ABVD (DOXOrubicin / Bleomycin / VinBLAStine / Dacarbazine), 28 Day Cycles     3/12/2024 Cancer Staged    Staging form: Hodgkin and Non-Hodgkin Lymphoma, AJCC 8th Edition, Clinical stage from 3/12/2024: Stage II (Hodgkin lymphoma, A - Asymptomatic) - Signed by Dayton Canada MD on 3/12/2024         Edward Wolfe  reports that he has never smoked. He has never been exposed to tobacco smoke. He has never used smokeless tobacco.  He  reports current alcohol use.  He  reports no history of drug use.    Physical Exam  Constitutional:       Appearance: Normal appearance.   HENT:      Head: Normocephalic and atraumatic.      Nose: Nose normal.      Mouth/Throat:      Mouth: Mucous membranes are moist.      Pharynx: Oropharynx is clear.   Eyes:      Extraocular Movements: Extraocular movements intact.      Conjunctiva/sclera: Conjunctivae normal.      Pupils: Pupils are equal, round, and reactive to light.   Cardiovascular:      Rate and Rhythm: Normal rate and regular rhythm.   Pulmonary:      Effort: Pulmonary effort is normal.      Breath sounds: Normal breath sounds.   Abdominal:      General: Abdomen is flat. Bowel sounds are normal.      Palpations: Abdomen is soft.   Musculoskeletal:         General: Normal range of motion.      Cervical back: Normal range of motion and neck supple.   Neurological:      General: No focal deficit present.      Mental Status: He is alert and oriented to person, place, and time. Mental status is at baseline. "   Psychiatric:         Mood and Affect: Mood normal.         Behavior: Behavior normal.         Thought Content: Thought content normal.         Judgment: Judgment normal.         Performance Status:  Asymptomatic    Assessment/Plan    The patient is a 48-year-old man with past medical history of hypertension, hypercholesterolemia and a newly diagnosed classical Hodgkin's lymphoma involving mediastinum as well as possible right supraclavicular lymph node.  Physical examination was within normal limits.  There was no palpable cervical axillary inguinal lymphadenopathy.  I had a detailed discussion with the patient explained to him that it would be prudent to obtain more staging information such as bone marrow aspiration biopsy and also consider color Doppler echocardiogram, pulmonary function test as well as a port placement.  The patient and his wife understood appreciated all the details provided and were grateful.    Patient and his wife had come for follow-up on December 26, 2023 regarding history of classical Hodgkin's lymphoma involving left supra clavicular and mediastinal/hilar lymph nodes, stage IIa.  Color Doppler echocardiogram revealed ejection fraction 65% pulmonary function tests in reference range, Btrzhg-a-Gjpw was placed.  And a detailed discussion with the patient explained to him that if agreeable would recommend chemotherapy using Adriamycin, bleomycin, vinblastine, dacarbazine every 2 weeks x 4 cycles followed by PET scan and then radiation oncology evaluation.  Other option to do nothing.  After thinking through all the options the patient is agreeable to start chemotherapy.  Effect side effects explained.  Printed material from [employer] [facility] provided.  Informed consent obtained.  The patient to start chemotherapy first week of January 2024.  The patient and wife had come for follow-up January 31, 2024 regarding history of stage IIa classical Hodgkin's lymphoma involving left supraclavicular and right  mediastinal lymph nodes.  The patient was placed on a chemotherapy regimen using ABVD.  He has received and tolerated cycle 1 day 1 and 14 without any problems.  The patient to receive cycle 2 day 1 on February 2, 2024.  I have recommended restaging PET scan after completion of cycle 2 and return in 4 weeks.    The patient   had come for follow-up on November 5, 2024 regarding history of stage IIa classical Hodgkin's lymphoma involving left supraclavicular and right mediastinal lymph nodes.  The patient received 2 cycles of chemotherapy using ABVD.  A PET scan revealed excellent response with Deauville score of 2.  The patient received radiation therapy, 20 GY in 10 fractions, completed on April 16, 2024 the patient is feeling much better.  A PET scan on August 20, 2024 revealed No FDG avid gary or extranodal disease. There has been continued  improvement/resolution of previously seen mild hypermetabolic  activity in a right paratracheal lymph node. Deauville score 1.   Return in 3 months.    The patient   had come for follow-up on February 3, 2025 regarding history of stage IIa classical Hodgkin's lymphoma involving left supraclavicular and right mediastinal lymph nodes.  The patient received 2 cycles of chemotherapy using ABVD.  A PET scan revealed excellent response with Deauville score of 2, status post radiation therapy.  The patient is feeling much better.  Physical examination within normal limits.  Return in 3 months.      Thank you for allowing me to participate in the care of your patient if you have any questions please feel free to call me      Diagnoses and all orders for this visit:  Hodgkin lymphoma of intrathoracic lymph nodes, unspecified Hodgkin lymphoma type (CMS/HCC)  -     Bone Marrow Evaluation  -     Onco-Echo Complete (Strain And 3D); Future  -     Pulmonary function testing; Future  Lymph node enlargement  -     Referral to Malignant Hematology (Hematology / Oncology)  -     Bone Marrow  Evaluation  -     Onco-Echo Complete (Strain And 3D); Future  -     Pulmonary function testing; Future  Other classical Hodgkin lymphoma of lymph nodes of multiple regions (CMS/HCC)  -     Bone Marrow Evaluation  -     Onco-Echo Complete (Strain And 3D); Future  -     Pulmonary function testing; Future           Abelino Flores MD

## 2025-02-06 LAB
NON-UH HIE A/G RATIO: 1.3
NON-UH HIE ALB: 4.2 G/DL (ref 3.4–5)
NON-UH HIE ALK PHOS: 73 UNIT/L (ref 45–117)
NON-UH HIE BASO COUNT: 0.03 X1000 (ref 0–0.2)
NON-UH HIE BASOS %: 0.3 %
NON-UH HIE BILIRUBIN, TOTAL: 0.6 MG/DL (ref 0.3–1.2)
NON-UH HIE BUN/CREAT RATIO: 17.3
NON-UH HIE BUN: 19 MG/DL (ref 9–23)
NON-UH HIE CALCIUM: 9.4 MG/DL (ref 8.7–10.4)
NON-UH HIE CALCULATED OSMOLALITY: 279 MOSM/KG (ref 275–295)
NON-UH HIE CHLORIDE: 104 MMOL/L (ref 98–107)
NON-UH HIE CO2, VENOUS: 26 MMOL/L (ref 20–31)
NON-UH HIE CREATININE: 1.1 MG/DL (ref 0.6–1.1)
NON-UH HIE DIFF?: NO
NON-UH HIE EOS COUNT: 0.11 X1000 (ref 0–0.5)
NON-UH HIE EOSIN %: 1.4 %
NON-UH HIE GFR AA: >60
NON-UH HIE GLOBULIN: 3.2 G/DL
NON-UH HIE GLOMERULAR FILTRATION RATE: >60 ML/MIN/1.73M?
NON-UH HIE GLUCOSE: 82 MG/DL (ref 74–106)
NON-UH HIE GOT: 33 UNIT/L (ref 15–37)
NON-UH HIE GPT: 37 UNIT/L (ref 10–49)
NON-UH HIE HCT: 39.8 % (ref 41–52)
NON-UH HIE HGB: 13.8 G/DL (ref 13.5–17.5)
NON-UH HIE INSTR WBC: 8.3
NON-UH HIE K: 4.3 MMOL/L (ref 3.5–5.1)
NON-UH HIE LYMPH %: 15.5 %
NON-UH HIE LYMPH COUNT: 1.28 X1000 (ref 1.2–4.8)
NON-UH HIE MCH: 31 PG (ref 27–34)
NON-UH HIE MCHC: 34.7 G/DL (ref 32–37)
NON-UH HIE MCV: 89.5 FL (ref 80–100)
NON-UH HIE MONO %: 10 %
NON-UH HIE MONO COUNT: 0.83 X1000 (ref 0.1–1)
NON-UH HIE MPV: 7.3 FL (ref 7.4–10.4)
NON-UH HIE NA: 139 MMOL/L (ref 135–145)
NON-UH HIE NEUTROPHIL %: 72.8 %
NON-UH HIE NEUTROPHIL COUNT (ANC): 6.02 X1000 (ref 1.4–8.8)
NON-UH HIE NUCLEATED RBC: 0 /100WBC
NON-UH HIE PLATELET: 378 X10 (ref 150–450)
NON-UH HIE RBC: 4.45 X10 (ref 4.7–6.1)
NON-UH HIE RDW: 13 % (ref 11.5–14.5)
NON-UH HIE TOTAL PROTEIN: 7.4 G/DL (ref 5.7–8.2)
NON-UH HIE WBC: 8.3 X10 (ref 4.5–11)

## 2025-02-07 LAB
NON-UH HIE BASO COUNT: 0.03 X1000 (ref 0–0.2)
NON-UH HIE BASOS %: 0.5 %
NON-UH HIE DIFF?: NO
NON-UH HIE EOS COUNT: 0.11 X1000 (ref 0–0.5)
NON-UH HIE EOSIN %: 1.9 %
NON-UH HIE HCT: 39.6 % (ref 41–52)
NON-UH HIE HGB: 13.7 G/DL (ref 13.5–17.5)
NON-UH HIE INSTR WBC: 5.9
NON-UH HIE LYMPH %: 19 %
NON-UH HIE LYMPH COUNT: 1.13 X1000 (ref 1.2–4.8)
NON-UH HIE MCH: 31 PG (ref 27–34)
NON-UH HIE MCHC: 34.7 G/DL (ref 32–37)
NON-UH HIE MCV: 89.4 FL (ref 80–100)
NON-UH HIE MONO %: 11.9 %
NON-UH HIE MONO COUNT: 0.71 X1000 (ref 0.1–1)
NON-UH HIE MPV: 7.4 FL (ref 7.4–10.4)
NON-UH HIE NEUTROPHIL %: 66.6 %
NON-UH HIE NEUTROPHIL COUNT (ANC): 3.96 X1000 (ref 1.4–8.8)
NON-UH HIE NUCLEATED RBC: 0 /100WBC
NON-UH HIE PLATELET: 375 X10 (ref 150–450)
NON-UH HIE RBC: 4.43 X10 (ref 4.7–6.1)
NON-UH HIE RDW: 12.8 % (ref 11.5–14.5)
NON-UH HIE WBC: 5.9 X10 (ref 4.5–11)

## 2025-05-01 ENCOUNTER — LAB (OUTPATIENT)
Dept: LAB | Facility: HOSPITAL | Age: 50
End: 2025-05-01
Payer: COMMERCIAL

## 2025-05-01 DIAGNOSIS — C81.92: Primary | ICD-10-CM

## 2025-05-01 LAB
ALBUMIN SERPL BCP-MCNC: 5 G/DL (ref 3.4–5)
ALP SERPL-CCNC: 69 U/L (ref 33–120)
ALT SERPL W P-5'-P-CCNC: 40 U/L (ref 10–52)
ANION GAP SERPL CALC-SCNC: 15 MMOL/L (ref 10–20)
AST SERPL W P-5'-P-CCNC: 30 U/L (ref 9–39)
BILIRUB SERPL-MCNC: 0.4 MG/DL (ref 0–1.2)
BUN SERPL-MCNC: 14 MG/DL (ref 6–23)
CALCIUM SERPL-MCNC: 9.3 MG/DL (ref 8.6–10.6)
CHLORIDE SERPL-SCNC: 106 MMOL/L (ref 98–107)
CO2 SERPL-SCNC: 23 MMOL/L (ref 21–32)
CREAT SERPL-MCNC: 0.9 MG/DL (ref 0.5–1.3)
EGFRCR SERPLBLD CKD-EPI 2021: >90 ML/MIN/1.73M*2
GLUCOSE SERPL-MCNC: 93 MG/DL (ref 74–99)
IRON SATN MFR SERPL: 22 % (ref 25–45)
IRON SERPL-MCNC: 94 UG/DL (ref 35–150)
POTASSIUM SERPL-SCNC: 4.1 MMOL/L (ref 3.5–5.3)
PROT SERPL-MCNC: 7.5 G/DL (ref 6.4–8.2)
SODIUM SERPL-SCNC: 140 MMOL/L (ref 136–145)
TIBC SERPL-MCNC: 419 UG/DL (ref 240–445)
UIBC SERPL-MCNC: 325 UG/DL (ref 110–370)

## 2025-05-01 PROCEDURE — 80053 COMPREHEN METABOLIC PANEL: CPT

## 2025-05-01 PROCEDURE — 85025 COMPLETE CBC W/AUTO DIFF WBC: CPT

## 2025-05-01 PROCEDURE — 83550 IRON BINDING TEST: CPT

## 2025-05-01 PROCEDURE — 83540 ASSAY OF IRON: CPT

## 2025-05-02 LAB
BASOPHILS # BLD AUTO: 0.02 X10*3/UL (ref 0–0.1)
BASOPHILS NFR BLD AUTO: 0.4 %
EOSINOPHIL # BLD AUTO: 0.12 X10*3/UL (ref 0–0.7)
EOSINOPHIL NFR BLD AUTO: 2.3 %
ERYTHROCYTE [DISTWIDTH] IN BLOOD BY AUTOMATED COUNT: 12.1 % (ref 11.5–14.5)
HCT VFR BLD AUTO: 38.5 % (ref 41–52)
HGB BLD-MCNC: 13 G/DL (ref 13.5–17.5)
HOLD SPECIMEN: 293
IMM GRANULOCYTES # BLD AUTO: 0.02 X10*3/UL (ref 0–0.7)
IMM GRANULOCYTES NFR BLD AUTO: 0.4 % (ref 0–0.9)
LYMPHOCYTES # BLD AUTO: 1.05 X10*3/UL (ref 1.2–4.8)
LYMPHOCYTES NFR BLD AUTO: 20 %
MCH RBC QN AUTO: 30.4 PG (ref 26–34)
MCHC RBC AUTO-ENTMCNC: 33.8 G/DL (ref 32–36)
MCV RBC AUTO: 90 FL (ref 80–100)
MONOCYTES # BLD AUTO: 0.66 X10*3/UL (ref 0.1–1)
MONOCYTES NFR BLD AUTO: 12.6 %
NEUTROPHILS # BLD AUTO: 3.38 X10*3/UL (ref 1.2–7.7)
NEUTROPHILS NFR BLD AUTO: 64.3 %
NRBC BLD-RTO: 0 /100 WBCS (ref 0–0)
PLATELET # BLD AUTO: 356 X10*3/UL (ref 150–450)
RBC # BLD AUTO: 4.27 X10*6/UL (ref 4.5–5.9)
WBC # BLD AUTO: 5.3 X10*3/UL (ref 4.4–11.3)

## 2025-05-05 ENCOUNTER — OFFICE VISIT (OUTPATIENT)
Dept: HEMATOLOGY/ONCOLOGY | Facility: CLINIC | Age: 50
End: 2025-05-05
Payer: COMMERCIAL

## 2025-05-05 VITALS
OXYGEN SATURATION: 97 % | SYSTOLIC BLOOD PRESSURE: 133 MMHG | TEMPERATURE: 97.2 F | WEIGHT: 244.49 LBS | BODY MASS INDEX: 37.71 KG/M2 | DIASTOLIC BLOOD PRESSURE: 95 MMHG | RESPIRATION RATE: 18 BRPM | HEART RATE: 87 BPM

## 2025-05-05 DIAGNOSIS — C81.72 OTHER CLASSICAL HODGKIN LYMPHOMA OF INTRATHORACIC LYMPH NODES: ICD-10-CM

## 2025-05-05 DIAGNOSIS — C81.92 HODGKIN LYMPHOMA OF INTRATHORACIC LYMPH NODES, UNSPECIFIED HODGKIN LYMPHOMA TYPE (MULTI): ICD-10-CM

## 2025-05-05 DIAGNOSIS — D69.6 THROMBOCYTOPENIA (CMS-HCC): ICD-10-CM

## 2025-05-05 PROCEDURE — 99214 OFFICE O/P EST MOD 30 MIN: CPT | Performed by: INTERNAL MEDICINE

## 2025-05-05 PROCEDURE — 3080F DIAST BP >= 90 MM HG: CPT | Performed by: INTERNAL MEDICINE

## 2025-05-05 PROCEDURE — 3075F SYST BP GE 130 - 139MM HG: CPT | Performed by: INTERNAL MEDICINE

## 2025-05-05 ASSESSMENT — PAIN SCALES - GENERAL: PAINLEVEL_OUTOF10: 0-NO PAIN

## 2025-05-05 NOTE — PROGRESS NOTES
Patient ID: Edward Wolfe is a 49 y.o. male.  Referring Physician: Abelino Flores MD  [address] [facility], [facility] [address]  [place] [address]  Primary Care Provider: Nellie Harrison DO  Cancer history:    Classical Hodgkin's lymphoma involving right paratracheal lymph node.  PET scan positive in mediastinum as well as left supraglottic.  Stage IIa  A PET scan after 2 cycles of ABVD revealed excellent response Deauville score of 2.  Total dose Adriamycin 232 mg.  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  CT scan of chest revealed interval increase in the size of an irregular mass in the anterior mediastinum with associated calcification when compared to prior CT scan examination on August 11, 2021.  Prominent mediastinal lymphadenopathy is also noted which is increased compared to prior examination in 2021.  Findings are suspicious.  An EBUS by  on November 7, 2023 revealed anterior mediastinal paratracheal lymph node mass.  Biopsy consistent with classic Hodgkin's lymphoma.  A PET scan on September 18, 2023 revealedIMPRESSION:  1. Hypermetabolic anterior mediastinal mass which is concerning for  neoplasm. Further evaluation with soft tissue biopsy can be  considered.  2. Hypermetabolic mediastinal and right supraclavicular lymph nodes  which are concerning for metastatic lymphadenopathy.  3. No evidence of distal hypermetabolic lesions concerning for  metastases. the patient was referred for further evaluation and management.    At interview on November 22, 2023 the patient and his wife narrated entire history.  The patient is asymptomatic.  Denied a history of weight loss, fevers, night sweats, chest pain, shortness of breath, nausea, vomiting,  hematemesis, melena, hematochezia and hematuria.    The patient and his wife had come for follow-up on December 26, 2023 regarding history of stage IIa classical Hodgkin's lymphoma involving left supraclavicular and right mediastinal lymph nodes.  The patient is asymptomatic.  [id number]-[id number] was placed, color Doppler echocardiogram and pulmonary function tests were done as well.  The patient is asymptomatic.    The patient and wife had come for follow-up January 31, 2024 regarding history of stage IIa classical Hodgkin's lymphoma involving left supraclavicular and right mediastinal lymph nodes.  The patient was placed on a chemotherapy regimen using ABVD.  He has received and tolerated cycle 1 day 1 and 14 without any problems.    The patient   had come for follow-up on November 5, 2024 regarding history of stage IIa classical Hodgkin's lymphoma involving left supraclavicular and right mediastinal lymph nodes.  The patient received 2 cycles of chemotherapy using ABVD.  A PET scan revealed excellent response with Deauville score of 2, status post radiation therapy.  The patient is feeling much better.    The patient   had come for follow-up on February 3, 2025 regarding history of stage IIa classical Hodgkin's lymphoma involving left supraclavicular and right mediastinal lymph nodes.  The patient received 2 cycles of chemotherapy using ABVD.  A PET scan revealed excellent response with Deauville score of 2, status post radiation therapy.  The patient is feeling much better.    The patient   had come for follow-up on May 5, 2025 regarding history of stage IIa classical Hodgkin's lymphoma involving left supraclavicular and right mediastinal lymph nodes.  The patient received 2 cycles of chemotherapy using ABVD.  A PET scan revealed excellent response with Deauville score of 2, status post radiation therapy.  The patient is feeling much better.    Past medical history:    Hypertension, hypercholesterolemia.    Past  surgical history:    Right shoulder and right hip surgery.    Colonoscopy:    September 2023.    Family history:    Reviewed but not relevant.    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    Review of Systems   All other systems reviewed and are negative.       Objective   BSA: 2.3 meters squared  BP (!) 133/95   Pulse 87   Temp 36.2 °C (97.2 °F)   Resp 18   Wt 111 kg (244 lb 7.8 oz)   SpO2 97%   BMI 37.71 kg/m²     Family History   Problem Relation Name Age of Onset    Lung cancer Father       Oncology History   Hodgkin lymphoma of intrathoracic lymph nodes (Multi)   11/22/2023 Initial Diagnosis    Hodgkin lymphoma of intrathoracic lymph nodes (CMS/HCC)     1/5/2024 -  Chemotherapy    ABVD (DOXOrubicin / Bleomycin / VinBLAStine / Dacarbazine), 28 Day Cycles     3/12/2024 Cancer Staged    Staging form: Hodgkin and Non-Hodgkin Lymphoma, AJCC 8th Edition, Clinical stage from 3/12/2024: Stage II (Hodgkin lymphoma, A - Asymptomatic) - Signed by Dayton Canada MD on 3/12/2024         Edward MICHELE Yaneth  reports that he has never smoked. He has never been exposed to tobacco smoke. He has never used smokeless tobacco.  He  reports current alcohol use.  He  reports no history of drug use.    Physical Exam  Constitutional:       Appearance: Normal appearance.   HENT:      Head: Normocephalic and atraumatic.      Nose: Nose normal.      Mouth/Throat:      Mouth: Mucous membranes are moist.      Pharynx: Oropharynx is clear.   Eyes:      Extraocular Movements: Extraocular movements intact.      Conjunctiva/sclera: Conjunctivae normal.      Pupils: Pupils are equal, round, and reactive to light.   Cardiovascular:      Rate and Rhythm: Normal rate and regular rhythm.   Pulmonary:      Effort: Pulmonary effort is normal.      Breath sounds: Normal breath sounds.   Abdominal:      General: Abdomen is  flat. Bowel sounds are normal.      Palpations: Abdomen is soft.   Musculoskeletal:         General: Normal range of motion.      Cervical back: Normal range of motion and neck supple.   Neurological:      General: No focal deficit present.      Mental Status: He is alert and oriented to person, place, and time. Mental status is at baseline.   Psychiatric:         Mood and Affect: Mood normal.         Behavior: Behavior normal.         Thought Content: Thought content normal.         Judgment: Judgment normal.         Performance Status:  Asymptomatic    Assessment/Plan    The patient is a 48-year-old man with past medical history of hypertension, hypercholesterolemia and a newly diagnosed classical Hodgkin's lymphoma involving mediastinum as well as possible right supraclavicular lymph node.  Physical examination was within normal limits.  There was no palpable cervical axillary inguinal lymphadenopathy.  I had a detailed discussion with the patient explained to him that it would be prudent to obtain more staging information such as bone marrow aspiration biopsy and also consider color Doppler echocardiogram, pulmonary function test as well as a port placement.  The patient and his wife understood appreciated all the details provided and were grateful.    Patient and his wife had come for follow-up on December 26, 2023 regarding history of classical Hodgkin's lymphoma involving left supra clavicular and mediastinal/hilar lymph nodes, stage IIa.  Color Doppler echocardiogram revealed ejection fraction 65% pulmonary function tests in reference range, [id number] was placed.  And a detailed discussion with the patient explained to him that if agreeable would recommend chemotherapy using Adriamycin, bleomycin, vinblastine, dacarbazine every 2 weeks x 4 cycles followed by PET scan and then radiation oncology evaluation.  Other option to do nothing.  After thinking through all the options the patient is agreeable to  start chemotherapy.  Effect side effects explained.  Printed material from [employer] provided.  Informed consent obtained.  The patient to start chemotherapy first week of January 2024.  The patient and wife had come for follow-up January 31, 2024 regarding history of stage IIa classical Hodgkin's lymphoma involving left supraclavicular and right mediastinal lymph nodes.  The patient was placed on a chemotherapy regimen using ABVD.  He has received and tolerated cycle 1 day 1 and 14 without any problems.  The patient to receive cycle 2 day 1 on February 2, 2024.  I have recommended restaging PET scan after completion of cycle 2 and return in 4 weeks.    The patient   had come for follow-up on November 5, 2024 regarding history of stage IIa classical Hodgkin's lymphoma involving left supraclavicular and right mediastinal lymph nodes.  The patient received 2 cycles of chemotherapy using ABVD.  A PET scan revealed excellent response with Deauville score of 2.  The patient received radiation therapy, 20 GY in 10 fractions, completed on April 16, 2024 the patient is feeling much better.  A PET scan on August 20, 2024 revealed No FDG avid gary or extranodal disease. There has been continued  improvement/resolution of previously seen mild hypermetabolic  activity in a right paratracheal lymph node. Deauville score 1.   Return in 3 months.    The patient   had come for follow-up on February 3, 2025 regarding history of stage IIa classical Hodgkin's lymphoma involving left supraclavicular and right mediastinal lymph nodes.  The patient received 2 cycles of chemotherapy using ABVD.  A PET scan revealed excellent response with Deauville score of 2, status post radiation therapy.  The patient is feeling much better.  Physical examination within normal limits.  Return in 3 months.    The patient   had come for follow-up on May 5, 2025 regarding history of stage IIa classical Hodgkin's lymphoma involving left supraclavicular and right  mediastinal lymph nodes.  The patient received 2 cycles of chemotherapy using ABVD.  A PET scan revealed excellent response with Deauville score of 2, status post radiation therapy.  The patient is feeling much better.  CBC on May 1, 2025 revealed hemoglobin 13 g/dL, iron saturation slightly decreased at 20%.  Previous hemoglobin 13.6 g/dL.  Recommend Slow Fe 65 mg half an hour after food daily or Ferrex 150 mg p.o. half an hour after food daily, recommend GI evaluation.  Return in 3 months.      Thank you for allowing me to participate in the care of your patient if you have any questions please feel free to call me      Diagnoses and all orders for this visit:  Hodgkin lymphoma of intrathoracic lymph nodes, unspecified Hodgkin lymphoma type (CMS/HCC)  -     Bone Marrow Evaluation  -     Onco-Echo Complete (Strain And 3D); Future  -     Pulmonary function testing; Future  Lymph node enlargement  -     Referral to Malignant Hematology (Hematology / Oncology)  -     Bone Marrow Evaluation  -     Onco-Echo Complete (Strain And 3D); Future  -     Pulmonary function testing; Future  Other classical Hodgkin lymphoma of lymph nodes of multiple regions (CMS/HCC)  -     Bone Marrow Evaluation  -     Onco-Echo Complete (Strain And 3D); Future  -     Pulmonary function testing; Future           Abelino Flores MD

## 2025-06-10 DIAGNOSIS — Z12.5 PROSTATE CANCER SCREENING: ICD-10-CM

## 2025-06-10 DIAGNOSIS — Z00.00 HEALTHCARE MAINTENANCE: Primary | ICD-10-CM

## 2025-06-10 NOTE — PROGRESS NOTES
"Subjective   Patient ID: Edward Wolfe is a 50 y.o. male who presents for Annual Exam (Blood work done. ).    HPI   50 year-old male presents for a physical.     Recent labs:        HbA1c 5.9 glucose 110   ALT 52   vit D 27  Tsh, psa wnl    cbc her heme/onc - on iron and has fu with GI    Diagnosed with stage IIa classical Hodgkin's lymphoma involving left supraclavicular and right mediastinal lymph nodes 2023; s/p chemo/radiation  sees onco-dr Flores    hx of HTN- on lisinopril 10mg; does not check BP at home.   hx of HLD- on gemfibrozil and atorvastatin  hx of anxiety- on zoloft and doing ok  hx elevated glucose/prediabetes now    BMI 38 wt is up 12 lbs since seen last june  +exercise and trying to eat a healthy diet  recent vacation was eating more and drinking more than usual    tetanus- 2021  flu shot- defers  Shingrix-not yet    he denies any chest pains, palpitations, edema, shortness of breath, abdominal pains, reflux, nausea, vomiting, diarrhea, constipation, blood in stool, melena. no groin or testicle pain or swelling.       9/2023 colonoscopy fu 5 yrs GI [facility]  9/2023 EGD +ulcer;    8/2024 EGD gastritis; recommended GI recommended repeat 8 to 12 weeks but patient did not have done.  Does have upcoming appointment with GI  Has not taking omeprazole regularly    Sees ortho for knee pains.   Uses Mobic daily- does not take with food    Hx BESSIE- used to use CPAP but not recently  +snoring/apnea/fatigue  hasnt seen sleep med yet        Review of Systems  ROS as stated in HPI    Objective   BP (!) 148/98   Pulse 78   Temp 36.7 °C (98 °F)   Ht 1.715 m (5' 7.5\")   Wt 112 kg (246 lb 12.8 oz)   BMI 38.08 kg/m²     Physical Exam  Constitutional: A&O; NAD  HEENT: conjunctiva normal. EOMI; PERRLA; lids normal; TM's clear;   Neck: supple; No lymphadenopathy   Heart: RRR     Lungs: CTA bilateral   Abd: Soft, Nontender, Nondistended  Ext:  No edema;    Neuro: No gross neuro deficits "     Psych: Judgment, orientation, mood, affect, insight wnl   Assessment/Plan   Problem List Items Addressed This Visit           ICD-10-CM    Hypertension I10    Relevant Medications    tirzepatide, weight loss, (Zepbound) 2.5 mg/0.5 mL injection    Other Relevant Orders    Comprehensive Metabolic Panel    BMI 38.0-38.9,adult Z68.38    Relevant Medications    tirzepatide, weight loss, (Zepbound) 2.5 mg/0.5 mL injection    BESSIE (obstructive sleep apnea) G47.33    Relevant Medications    tirzepatide, weight loss, (Zepbound) 2.5 mg/0.5 mL injection    Other Relevant Orders    Referral to Adult Sleep Medicine    Prediabetes R73.03    Relevant Medications    tirzepatide, weight loss, (Zepbound) 2.5 mg/0.5 mL injection    Other Relevant Orders    Comprehensive Metabolic Panel    Hemoglobin A1C    Vitamin D deficiency E55.9    Relevant Orders    Vitamin D 25-Hydroxy,Total (for eval of Vitamin D levels)    Hypercholesterolemia E78.00    Relevant Medications    tirzepatide, weight loss, (Zepbound) 2.5 mg/0.5 mL injection    Other Relevant Orders    Comprehensive Metabolic Panel    Lipid Panel     Other Visit Diagnoses         Codes      Healthcare maintenance    -  Primary Z00.00      LFT elevation     R79.89    Relevant Orders    Comprehensive Metabolic Panel      Essential (primary) hypertension     I10    Relevant Medications    lisinopril 20 mg tablet          Reviewed recent labs  see if insurance will cover zepbound  start otc vit D supplement  recheck labs in 3-4 mo and fu after or fu sooner if needed  increase lisinopril from 10 to 20mg/day;  monitor BP;   9/2023 colonoscopy fu [facility] yrs GI [facility]  8/2024 EGD gastritis; recommended repeat 8-12 wks  taking iron; heme monitoring;  fu with GI; advised to take mobic with caution and take with food  Tdap 2021  defers flu shot  Shingrix recommended   healthy lifestyle-diet, exercise, wt loss  Fu with sleep med re: BESSIE  Fu with onco

## 2025-06-12 ENCOUNTER — APPOINTMENT (OUTPATIENT)
Dept: PRIMARY CARE | Facility: CLINIC | Age: 50
End: 2025-06-12
Payer: COMMERCIAL

## 2025-06-12 VITALS
BODY MASS INDEX: 37.41 KG/M2 | WEIGHT: 246.8 LBS | DIASTOLIC BLOOD PRESSURE: 98 MMHG | SYSTOLIC BLOOD PRESSURE: 148 MMHG | HEART RATE: 78 BPM | TEMPERATURE: 98 F | HEIGHT: 68 IN

## 2025-06-12 DIAGNOSIS — I10 HYPERTENSION, UNSPECIFIED TYPE: ICD-10-CM

## 2025-06-12 DIAGNOSIS — I10 ESSENTIAL (PRIMARY) HYPERTENSION: ICD-10-CM

## 2025-06-12 DIAGNOSIS — R79.89 LFT ELEVATION: ICD-10-CM

## 2025-06-12 DIAGNOSIS — R73.03 PREDIABETES: ICD-10-CM

## 2025-06-12 DIAGNOSIS — G47.33 OSA (OBSTRUCTIVE SLEEP APNEA): ICD-10-CM

## 2025-06-12 DIAGNOSIS — Z00.00 HEALTHCARE MAINTENANCE: Primary | ICD-10-CM

## 2025-06-12 DIAGNOSIS — E78.00 HYPERCHOLESTEROLEMIA: ICD-10-CM

## 2025-06-12 DIAGNOSIS — E55.9 VITAMIN D DEFICIENCY: ICD-10-CM

## 2025-06-12 LAB
25(OH)D3+25(OH)D2 SERPL-MCNC: 27 NG/ML (ref 30–100)
ALBUMIN SERPL-MCNC: 5 G/DL (ref 3.6–5.1)
ALP SERPL-CCNC: 79 U/L (ref 35–144)
ALT SERPL-CCNC: 52 U/L (ref 9–46)
ANION GAP SERPL CALCULATED.4IONS-SCNC: 9 MMOL/L (CALC) (ref 7–17)
AST SERPL-CCNC: 33 U/L (ref 10–35)
BILIRUB SERPL-MCNC: 0.5 MG/DL (ref 0.2–1.2)
BUN SERPL-MCNC: 19 MG/DL (ref 7–25)
CALCIUM SERPL-MCNC: 9.6 MG/DL (ref 8.6–10.3)
CHLORIDE SERPL-SCNC: 104 MMOL/L (ref 98–110)
CHOLEST SERPL-MCNC: 222 MG/DL
CHOLEST/HDLC SERPL: 5 (CALC)
CO2 SERPL-SCNC: 25 MMOL/L (ref 20–32)
CREAT SERPL-MCNC: 0.92 MG/DL (ref 0.7–1.3)
EGFRCR SERPLBLD CKD-EPI 2021: 100 ML/MIN/1.73M2
EST. AVERAGE GLUCOSE BLD GHB EST-MCNC: 123 MG/DL
EST. AVERAGE GLUCOSE BLD GHB EST-SCNC: 6.8 MMOL/L
GLUCOSE SERPL-MCNC: 110 MG/DL (ref 65–99)
HBA1C MFR BLD: 5.9 %
HDLC SERPL-MCNC: 44 MG/DL
LDLC SERPL CALC-MCNC: 142 MG/DL (CALC)
NONHDLC SERPL-MCNC: 178 MG/DL (CALC)
POTASSIUM SERPL-SCNC: 4.8 MMOL/L (ref 3.5–5.3)
PROT SERPL-MCNC: 7.7 G/DL (ref 6.1–8.1)
PSA SERPL-MCNC: 3.61 NG/ML
SODIUM SERPL-SCNC: 138 MMOL/L (ref 135–146)
TRIGL SERPL-MCNC: 223 MG/DL
TSH SERPL-ACNC: 1.08 MIU/L (ref 0.4–4.5)

## 2025-06-12 PROCEDURE — 99396 PREV VISIT EST AGE 40-64: CPT | Performed by: FAMILY MEDICINE

## 2025-06-12 PROCEDURE — 3080F DIAST BP >= 90 MM HG: CPT | Performed by: FAMILY MEDICINE

## 2025-06-12 PROCEDURE — 1036F TOBACCO NON-USER: CPT | Performed by: FAMILY MEDICINE

## 2025-06-12 PROCEDURE — 3008F BODY MASS INDEX DOCD: CPT | Performed by: FAMILY MEDICINE

## 2025-06-12 PROCEDURE — 3077F SYST BP >= 140 MM HG: CPT | Performed by: FAMILY MEDICINE

## 2025-06-12 RX ORDER — LISINOPRIL 20 MG/1
TABLET ORAL
Qty: 90 TABLET | Refills: 1 | Status: SHIPPED | OUTPATIENT
Start: 2025-06-12

## 2025-06-12 ASSESSMENT — PATIENT HEALTH QUESTIONNAIRE - PHQ9
1. LITTLE INTEREST OR PLEASURE IN DOING THINGS: NOT AT ALL
2. FEELING DOWN, DEPRESSED OR HOPELESS: NOT AT ALL
SUM OF ALL RESPONSES TO PHQ9 QUESTIONS 1 AND 2: 0

## 2025-07-14 DIAGNOSIS — E78.1 PURE HYPERGLYCERIDEMIA: ICD-10-CM

## 2025-07-14 DIAGNOSIS — E78.00 PURE HYPERCHOLESTEROLEMIA, UNSPECIFIED: ICD-10-CM

## 2025-07-14 DIAGNOSIS — F41.9 ANXIETY DISORDER, UNSPECIFIED: ICD-10-CM

## 2025-07-14 RX ORDER — ATORVASTATIN CALCIUM 40 MG/1
40 TABLET, FILM COATED ORAL DAILY
Qty: 90 TABLET | Refills: 1 | Status: SHIPPED | OUTPATIENT
Start: 2025-07-14

## 2025-07-14 RX ORDER — SERTRALINE HYDROCHLORIDE 100 MG/1
150 TABLET, FILM COATED ORAL DAILY
Qty: 135 TABLET | Refills: 1 | Status: SHIPPED | OUTPATIENT
Start: 2025-07-14

## 2025-07-14 RX ORDER — GEMFIBROZIL 600 MG/1
600 TABLET, FILM COATED ORAL DAILY
Qty: 90 TABLET | Refills: 1 | Status: SHIPPED | OUTPATIENT
Start: 2025-07-14

## 2025-07-21 DIAGNOSIS — I10 HYPERTENSION, UNSPECIFIED TYPE: ICD-10-CM

## 2025-07-21 DIAGNOSIS — R73.03 PREDIABETES: ICD-10-CM

## 2025-07-21 DIAGNOSIS — G47.33 OSA (OBSTRUCTIVE SLEEP APNEA): ICD-10-CM

## 2025-07-28 RX ORDER — TIRZEPATIDE 5 MG/.5ML
5 INJECTION, SOLUTION SUBCUTANEOUS
Qty: 4 ML | Refills: 0 | Status: SHIPPED | OUTPATIENT
Start: 2025-07-28

## 2025-07-30 ENCOUNTER — LAB (OUTPATIENT)
Dept: LAB | Facility: CLINIC | Age: 50
End: 2025-07-30
Payer: COMMERCIAL

## 2025-07-30 DIAGNOSIS — D69.6 THROMBOCYTOPENIA: ICD-10-CM

## 2025-07-30 DIAGNOSIS — C81.72 OTHER CLASSICAL HODGKIN LYMPHOMA OF INTRATHORACIC LYMPH NODES: ICD-10-CM

## 2025-07-30 DIAGNOSIS — C81.92 HODGKIN LYMPHOMA OF INTRATHORACIC LYMPH NODES, UNSPECIFIED HODGKIN LYMPHOMA TYPE (MULTI): ICD-10-CM

## 2025-07-30 DIAGNOSIS — C81.12 NODULAR SCLEROSIS HODGKIN LYMPHOMA OF INTRATHORACIC LYMPH NODES (MULTI): ICD-10-CM

## 2025-07-30 LAB
ALBUMIN SERPL BCP-MCNC: 4.7 G/DL (ref 3.4–5)
ALP SERPL-CCNC: 63 U/L (ref 33–120)
ALT SERPL W P-5'-P-CCNC: 25 U/L (ref 10–52)
ANION GAP SERPL CALC-SCNC: 12 MMOL/L (ref 10–20)
AST SERPL W P-5'-P-CCNC: 20 U/L (ref 9–39)
BASOPHILS # BLD AUTO: 0.01 X10*3/UL (ref 0–0.1)
BASOPHILS NFR BLD AUTO: 0.2 %
BILIRUB SERPL-MCNC: 0.4 MG/DL (ref 0–1.2)
BUN SERPL-MCNC: 20 MG/DL (ref 6–23)
CALCIUM SERPL-MCNC: 9.6 MG/DL (ref 8.6–10.3)
CHLORIDE SERPL-SCNC: 107 MMOL/L (ref 98–107)
CO2 SERPL-SCNC: 22 MMOL/L (ref 21–32)
CREAT SERPL-MCNC: 1.01 MG/DL (ref 0.5–1.3)
EGFRCR SERPLBLD CKD-EPI 2021: 89 ML/MIN/1.73M*2
EOSINOPHIL # BLD AUTO: 0.17 X10*3/UL (ref 0–0.7)
EOSINOPHIL NFR BLD AUTO: 2.7 %
ERYTHROCYTE [DISTWIDTH] IN BLOOD BY AUTOMATED COUNT: 12 % (ref 11.5–14.5)
FERRITIN SERPL-MCNC: 376 NG/ML (ref 20–300)
GLUCOSE SERPL-MCNC: 100 MG/DL (ref 74–99)
HCT VFR BLD AUTO: 37.7 % (ref 41–52)
HGB BLD-MCNC: 12.6 G/DL (ref 13.5–17.5)
IMM GRANULOCYTES # BLD AUTO: 0.04 X10*3/UL (ref 0–0.7)
IMM GRANULOCYTES NFR BLD AUTO: 0.6 % (ref 0–0.9)
IRON SATN MFR SERPL: 27 % (ref 25–45)
IRON SERPL-MCNC: 100 UG/DL (ref 35–150)
LYMPHOCYTES # BLD AUTO: 1.03 X10*3/UL (ref 1.2–4.8)
LYMPHOCYTES NFR BLD AUTO: 16.2 %
MCH RBC QN AUTO: 30.1 PG (ref 26–34)
MCHC RBC AUTO-ENTMCNC: 33.4 G/DL (ref 32–36)
MCV RBC AUTO: 90 FL (ref 80–100)
MONOCYTES # BLD AUTO: 0.56 X10*3/UL (ref 0.1–1)
MONOCYTES NFR BLD AUTO: 8.8 %
NEUTROPHILS # BLD AUTO: 4.53 X10*3/UL (ref 1.2–7.7)
NEUTROPHILS NFR BLD AUTO: 71.5 %
NRBC BLD-RTO: 0 /100 WBCS (ref 0–0)
PLATELET # BLD AUTO: 339 X10*3/UL (ref 150–450)
POTASSIUM SERPL-SCNC: 4.4 MMOL/L (ref 3.5–5.3)
PROT SERPL-MCNC: 7.1 G/DL (ref 6.4–8.2)
RBC # BLD AUTO: 4.18 X10*6/UL (ref 4.5–5.9)
SODIUM SERPL-SCNC: 137 MMOL/L (ref 136–145)
TIBC SERPL-MCNC: 377 UG/DL (ref 240–445)
UIBC SERPL-MCNC: 277 UG/DL (ref 110–370)
VIT B12 SERPL-MCNC: 292 PG/ML (ref 211–911)
WBC # BLD AUTO: 6.3 X10*3/UL (ref 4.4–11.3)

## 2025-07-30 PROCEDURE — 83550 IRON BINDING TEST: CPT

## 2025-07-30 PROCEDURE — 80053 COMPREHEN METABOLIC PANEL: CPT

## 2025-07-30 PROCEDURE — 36415 COLL VENOUS BLD VENIPUNCTURE: CPT

## 2025-07-30 PROCEDURE — 85025 COMPLETE CBC W/AUTO DIFF WBC: CPT

## 2025-07-30 PROCEDURE — 82607 VITAMIN B-12: CPT | Mod: PARLAB

## 2025-07-30 PROCEDURE — 82728 ASSAY OF FERRITIN: CPT | Mod: PARLAB

## 2025-08-04 ENCOUNTER — OFFICE VISIT (OUTPATIENT)
Dept: HEMATOLOGY/ONCOLOGY | Facility: CLINIC | Age: 50
End: 2025-08-04
Payer: COMMERCIAL

## 2025-08-04 VITALS
DIASTOLIC BLOOD PRESSURE: 81 MMHG | WEIGHT: 235.67 LBS | RESPIRATION RATE: 18 BRPM | SYSTOLIC BLOOD PRESSURE: 130 MMHG | TEMPERATURE: 97.2 F | OXYGEN SATURATION: 96 % | BODY MASS INDEX: 36.37 KG/M2 | HEART RATE: 85 BPM

## 2025-08-04 DIAGNOSIS — C81.92 HODGKIN LYMPHOMA OF INTRATHORACIC LYMPH NODES, UNSPECIFIED HODGKIN LYMPHOMA TYPE (MULTI): ICD-10-CM

## 2025-08-04 DIAGNOSIS — C81.72 OTHER CLASSICAL HODGKIN LYMPHOMA OF INTRATHORACIC LYMPH NODES: ICD-10-CM

## 2025-08-04 PROCEDURE — 3079F DIAST BP 80-89 MM HG: CPT | Performed by: INTERNAL MEDICINE

## 2025-08-04 PROCEDURE — 99214 OFFICE O/P EST MOD 30 MIN: CPT | Performed by: INTERNAL MEDICINE

## 2025-08-04 PROCEDURE — 3075F SYST BP GE 130 - 139MM HG: CPT | Performed by: INTERNAL MEDICINE

## 2025-08-04 ASSESSMENT — PAIN SCALES - GENERAL: PAINLEVEL_OUTOF10: 0-NO PAIN

## 2025-08-04 NOTE — PROGRESS NOTES
Patient ID: Edward Wolfe is a 50 y.o. male.  Referring Physician: No referring provider defined for this encounter.  Primary Care Provider: Nellie Harrison DO  Cancer history:    Classical Hodgkin's lymphoma involving right paratracheal lymph node.  PET scan positive in mediastinum as well as left supraglottic.  Stage IIa  A PET scan after 2 cycles of ABVD revealed excellent response Deauville score of 2.  Total dose Adriamycin 232 mg.  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  CT scan of chest revealed interval increase in the size of an irregular mass in the anterior mediastinum with associated calcification when compared to prior CT scan examination on August 11, 2021.  Prominent mediastinal lymphadenopathy is also noted which is increased compared to prior examination in 2021.  Findings are suspicious.  An EBUS by  on November 7, 2023 revealed anterior mediastinal paratracheal lymph node mass.  Biopsy consistent with classic Hodgkin's lymphoma.  A PET scan on September 18, 2023 revealedIMPRESSION:  1. Hypermetabolic anterior mediastinal mass which is concerning for  neoplasm. Further evaluation with soft tissue biopsy can be  considered.  2. Hypermetabolic mediastinal and right supraclavicular lymph nodes  which are concerning for metastatic lymphadenopathy.  3. No evidence of distal hypermetabolic lesions concerning for  metastases. the patient was referred for further evaluation and management.    At interview on November 22, 2023 the patient and his wife narrated entire history.  The patient is asymptomatic.  Denied a history of weight loss, fevers, night sweats, chest pain, shortness of breath, nausea, vomiting, hematemesis, melena, hematochezia and  hematuria.    The patient and his wife had come for follow-up on December 26, 2023 regarding history of stage IIa classical Hodgkin's lymphoma involving left supraclavicular and right mediastinal lymph nodes.  The patient is asymptomatic.  [id number] was placed, color Doppler echocardiogram and pulmonary function tests were done as well.  The patient is asymptomatic.    The patient and wife had come for follow-up January 31, 2024 regarding history of stage IIa classical Hodgkin's lymphoma involving left supraclavicular and right mediastinal lymph nodes.  The patient was placed on a chemotherapy regimen using ABVD.  He has received and tolerated cycle 1 day 1 and 14 without any problems.    The patient   had come for follow-up on November 5, 2024 regarding history of stage IIa classical Hodgkin's lymphoma involving left supraclavicular and right mediastinal lymph nodes.  The patient received 2 cycles of chemotherapy using ABVD.  A PET scan revealed excellent response with Deauville score of 2, status post radiation therapy.  The patient is feeling much better.    The patient   had come for follow-up on February 3, 2025 regarding history of stage IIa classical Hodgkin's lymphoma involving left supraclavicular and right mediastinal lymph nodes.  The patient received 2 cycles of chemotherapy using ABVD.  A PET scan revealed excellent response with Deauville score of 2, status post radiation therapy.  The patient is feeling much better.    The patient   had come for follow-up on August 4, 2025 regarding history of stage IIa classical Hodgkin's lymphoma involving left supraclavicular and right mediastinal lymph nodes.  The patient received 2 cycles of chemotherapy using ABVD.  A PET scan revealed excellent response with Deauville score of 2, status post radiation therapy.  The patient is feeling much better.    Past medical history:    Hypertension, hypercholesterolemia.    Past surgical history:    Right shoulder and  right hip surgery.    Colonoscopy:    September 2023.    Family history:    Reviewed but not relevant.    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    Review of Systems   All other systems reviewed and are negative.       Objective   BSA: 2.26 meters squared  /81   Pulse 85   Temp 36.2 °C (97.2 °F)   Resp 18   Wt 107 kg (235 lb 10.8 oz)   SpO2 96%   BMI 36.37 kg/m²     Family History   Problem Relation Name Age of Onset    Lung cancer Father       Oncology History   Hodgkin lymphoma of intrathoracic lymph nodes (Multi)   11/22/2023 Initial Diagnosis    Hodgkin lymphoma of intrathoracic lymph nodes (CMS/HCC)     1/5/2024 -  Chemotherapy    ABVD (DOXOrubicin / Bleomycin / VinBLAStine / Dacarbazine), 28 Day Cycles     3/12/2024 Cancer Staged    Staging form: Hodgkin and Non-Hodgkin Lymphoma, AJCC 8th Edition, Clinical stage from 3/12/2024: Stage II (Hodgkin lymphoma, A - Asymptomatic) - Signed by Dayton Canada MD on 3/12/2024         Edward MIHCELE Yaneth  reports that he has never smoked. He has never been exposed to tobacco smoke. He has never used smokeless tobacco.  He  reports current alcohol use.  He  reports no history of drug use.    Physical Exam  Constitutional:       Appearance: Normal appearance.   HENT:      Head: Normocephalic and atraumatic.      Nose: Nose normal.      Mouth/Throat:      Mouth: Mucous membranes are moist.      Pharynx: Oropharynx is clear.     Eyes:      Extraocular Movements: Extraocular movements intact.      Conjunctiva/sclera: Conjunctivae normal.      Pupils: Pupils are equal, round, and reactive to light.       Cardiovascular:      Rate and Rhythm: Normal rate and regular rhythm.   Pulmonary:      Effort: Pulmonary effort is normal.      Breath sounds: Normal breath sounds.   Abdominal:      General: Abdomen is flat. Bowel sounds are normal.       Palpations: Abdomen is soft.     Musculoskeletal:         General: Normal range of motion.      Cervical back: Normal range of motion and neck supple.     Neurological:      General: No focal deficit present.      Mental Status: He is alert and oriented to person, place, and time. Mental status is at baseline.     Psychiatric:         Mood and Affect: Mood normal.         Behavior: Behavior normal.         Thought Content: Thought content normal.         Judgment: Judgment normal.         Performance Status:  Asymptomatic    Assessment/Plan    The patient is a 48-year-old man with past medical history of hypertension, hypercholesterolemia and a newly diagnosed classical Hodgkin's lymphoma involving mediastinum as well as possible right supraclavicular lymph node.  Physical examination was within normal limits.  There was no palpable cervical axillary inguinal lymphadenopathy.  I had a detailed discussion with the patient explained to him that it would be prudent to obtain more staging information such as bone marrow aspiration biopsy and also consider color Doppler echocardiogram, pulmonary function test as well as a port placement.  The patient and his wife understood appreciated all the details provided and were grateful.    Patient and his wife had come for follow-up on December 26, 2023 regarding history of classical Hodgkin's lymphoma involving left supra clavicular and mediastinal/hilar lymph nodes, stage IIa.  Color Doppler echocardiogram revealed ejection fraction 65% pulmonary function tests in reference range, [id number] was placed.  And a detailed discussion with the patient explained to him that if agreeable would recommend chemotherapy using Adriamycin, bleomycin, vinblastine, dacarbazine every 2 weeks x 4 cycles followed by PET scan and then radiation oncology evaluation.  Other option to do nothing.  After thinking through all the options the patient is agreeable to start chemotherapy.  Effect side  effects explained.  Printed material from [employer] provided.  Informed consent obtained.  The patient to start chemotherapy first week of January 2024.  The patient and wife had come for follow-up January 31, 2024 regarding history of stage IIa classical Hodgkin's lymphoma involving left supraclavicular and right mediastinal lymph nodes.  The patient was placed on a chemotherapy regimen using ABVD.  He has received and tolerated cycle 1 day 1 and 14 without any problems.  The patient to receive cycle 2 day 1 on February 2, 2024.  I have recommended restaging PET scan after completion of cycle 2 and return in 4 weeks.    The patient   had come for follow-up on November 5, 2024 regarding history of stage IIa classical Hodgkin's lymphoma involving left supraclavicular and right mediastinal lymph nodes.  The patient received 2 cycles of chemotherapy using ABVD.  A PET scan revealed excellent response with Deauville score of 2.  The patient received radiation therapy, 20 GY in 10 fractions, completed on April 16, 2024 the patient is feeling much better.  A PET scan on August 20, 2024 revealed No FDG avid gary or extranodal disease. There has been continued  improvement/resolution of previously seen mild hypermetabolic  activity in a right paratracheal lymph node. Deauville score 1.   Return in 3 months.    The patient   had come for follow-up on February 3, 2025 regarding history of stage IIa classical Hodgkin's lymphoma involving left supraclavicular and right mediastinal lymph nodes.  The patient received 2 cycles of chemotherapy using ABVD.  A PET scan revealed excellent response with Deauville score of 2, status post radiation therapy.  The patient is feeling much better.  Physical examination within normal limits.  Return in 3 months.    The patient   had come for follow-up on May 5, 2025 regarding history of stage IIa classical Hodgkin's lymphoma involving left supraclavicular and right mediastinal lymph nodes.  The  patient received 2 cycles of chemotherapy using ABVD.  A PET scan revealed excellent response with Deauville score of 2, status post radiation therapy.  The patient is feeling much better.  CBC on May 1, 2025 revealed hemoglobin 13 g/dL, iron saturation slightly decreased at 20%.  Previous hemoglobin 13.6 g/dL.  Recommend Slow Fe 65 mg half an hour after food daily or Ferrex 150 mg p.o. half an hour after food daily, recommend GI evaluation.  Return in 3 months.    The patient   had come for follow-up on August 4, 2025 regarding history of stage IIa classical Hodgkin's lymphoma involving left supraclavicular and right mediastinal lymph nodes.  The patient received 2 cycles of chemotherapy using ABVD.  A PET scan revealed excellent response with Deauville score of 2, status post radiation therapy.  The patient is feeling much better.  I recommended a restaging PET scan.  Return in 3 months.      Thank you for allowing me to participate in the care of your patient if you have any questions please feel free to call me      Diagnoses and all orders for this visit:  Hodgkin lymphoma of intrathoracic lymph nodes, unspecified Hodgkin lymphoma type (CMS/HCC)  -     Bone Marrow Evaluation  -     Onco-Echo Complete (Strain And 3D); Future  -     Pulmonary function testing; Future  Lymph node enlargement  -     Referral to Malignant Hematology (Hematology / Oncology)  -     Bone Marrow Evaluation  -     Onco-Echo Complete (Strain And 3D); Future  -     Pulmonary function testing; Future  Other classical Hodgkin lymphoma of lymph nodes of multiple regions (CMS/HCC)  -     Bone Marrow Evaluation  -     Onco-Echo Complete (Strain And 3D); Future  -     Pulmonary function testing; Future           Abelino Flores MD

## 2025-08-06 ENCOUNTER — APPOINTMENT (OUTPATIENT)
Facility: CLINIC | Age: 50
End: 2025-08-06
Payer: COMMERCIAL

## 2025-08-06 VITALS
HEIGHT: 69 IN | DIASTOLIC BLOOD PRESSURE: 83 MMHG | BODY MASS INDEX: 34.96 KG/M2 | OXYGEN SATURATION: 95 % | SYSTOLIC BLOOD PRESSURE: 124 MMHG | RESPIRATION RATE: 18 BRPM | WEIGHT: 236 LBS | HEART RATE: 94 BPM

## 2025-08-06 DIAGNOSIS — Z72.821 INADEQUATE SLEEP HYGIENE: ICD-10-CM

## 2025-08-06 DIAGNOSIS — G47.33 OSA (OBSTRUCTIVE SLEEP APNEA): ICD-10-CM

## 2025-08-06 DIAGNOSIS — G47.33 OBSTRUCTIVE SLEEP APNEA: Primary | ICD-10-CM

## 2025-08-06 PROCEDURE — 1036F TOBACCO NON-USER: CPT | Performed by: PHYSICIAN ASSISTANT

## 2025-08-06 PROCEDURE — 3079F DIAST BP 80-89 MM HG: CPT | Performed by: PHYSICIAN ASSISTANT

## 2025-08-06 PROCEDURE — 3074F SYST BP LT 130 MM HG: CPT | Performed by: PHYSICIAN ASSISTANT

## 2025-08-06 PROCEDURE — 3008F BODY MASS INDEX DOCD: CPT | Performed by: PHYSICIAN ASSISTANT

## 2025-08-06 PROCEDURE — 99203 OFFICE O/P NEW LOW 30 MIN: CPT | Performed by: PHYSICIAN ASSISTANT

## 2025-08-06 ASSESSMENT — ENCOUNTER SYMPTOMS
LOSS OF SENSATION IN FEET: 0
OCCASIONAL FEELINGS OF UNSTEADINESS: 0
DEPRESSION: 0

## 2025-08-06 NOTE — PROGRESS NOTES
Patient: Edward Wolfe    [id number]  : 1975 -- AGE 50 y.o.    Provider: Diamond Hays PA-C     Location [facility]   Service Date: 2025              [facility]  New Visit Note      HISTORY OF PRESENT ILLNESS     The patient's referring provider is: Nellie Harrison DO; Nellie Harrison DO    HISTORY OF PRESENT ILLNESS   Edward Wolfe is a 50 y.o. male with h/o BESSIE, anxiety, depression, obesity, Hodgkin lmphoma, HTN who presents to a [facility] for a sleep medicine evaluation with concerns of Sleep Apnea (Establish care has CPAP but was not using it ).     Past Sleep History  Patient has had a sleep study in the past. The record is not available in clinic today. Don ein ? At [facility].         Current History    On today's visit, the patient reports here today to establish with sleep medicine. Has history of sleep apnea diagnosed ~10 years or so ago at [facility]. Used cpap for some time and does believe there was some benefit. Unclear why he is no longer using cpap. Does report weight is up maybe 15lbs or so since his last sleep study. He is symptomatic and interested in treating his sleep apnea again and getting updated equipment.    Symptoms- snoring, witnessed apnea, sleep maintenance issues, nocturia, waking with headaches. Does grind teeth - has a mouthguard.    Denies RLS. Denies dream enactment, sleep walking or other parasomnias. Denies insomnia.       Sleep schedule:  In bed: 9:30P   Subjective sleep latency:  within 30 min   Awakenings during night:  2-3 - nocturia/tossing and turning   Length of awakenings:  few minutes  Final awakening time:  6A  Naps: none     Overall estimate of total sleep time: 7 hours  Weekends/Days off: MN - 9A    Preferred sleeping position: SLEEP POSITION: sidelying        ESS:  7  HELEN:  6    REVIEW OF SYSTEMS     REVIEW OF SYSTEMS  See HPI; all other ROS were reviewed and negative for  compliant        ALLERGIES AND MEDICATIONS     ALLERGIES  Allergies[1]    MEDICATIONS  Current Medications[2]      PAST HISTORY     PAST MEDICAL HISTORY  He  has a past medical history of Anxiety, Arthritis, Contusion of right hand, initial encounter (04/07/2018), Disruption of wound, unspecified, initial encounter (01/02/2017), GERD (gastroesophageal reflux disease), Hyperlipidemia, Hypertension, Laceration without foreign body of left hand, initial encounter (01/02/2017), Laceration without foreign body of left hand, initial encounter (01/02/2017), Left testicular pain (11/06/2017), Lymphoma, Other abnormal findings in urine, Other injury of unspecified body region, initial encounter (01/02/2017), Other specified soft tissue disorders (04/20/2017), Pain in right hand (04/07/2018), Personal history of diseases of the skin and subcutaneous tissue (07/23/2019), Personal history of diseases of the skin and subcutaneous tissue (07/23/2019), Personal history of other diseases of the circulatory system (07/25/2019), Personal history of other mental and behavioral disorders (08/18/2014), Personal history of other specified conditions (11/06/2017), Sleep apnea, and Vision loss.    PAST SURGICAL HISTORY:  Surgical History[3]    FAMILY HISTORY  Family History[4]    He does not have a family history of sleep disorder.    SOCIAL HISTORY  He  reports that he has never smoked. He has never been exposed to tobacco smoke. He has never used smokeless tobacco. He reports current alcohol use. He reports that he does not use drugs. He    Caffeine consumption: Yes, Patient consumes caffeine beverage regularly, about 5-6 cup(s)/day. Pop  Alcohol consumption: Yes, Patient does consume alcohol, but not on a regular basis  Marijuana: No      PHYSICAL EXAM     VITAL SIGNS: There were no vitals taken for this visit.     CURRENT WEIGHT: There were no vitals filed for this visit.  There is no height or weight on file to calculate  BMI.  PREVIOUS WEIGHTS:  Wt Readings from Last 3 Encounters:   08/04/25 107 kg (235 lb 10.8 oz)   06/12/25 112 kg (246 lb 12.8 oz)   05/05/25 111 kg (244 lb 7.8 oz)       Constitutional: Alert and oriented, cooperative, no acute distress  Head: Normocephalic, atraumatic   Cranial Features: No abnormal craniofacial features     Upper Airway Examination:  Mallampati Class: 3  Tonsil Grade: 1+  Tongue Scalloping: none   Uvula: midline    Neck: Supple. Trachea midline.  Pulmonary: Non-labored breathing, speaks in full sentences.   Cardiac: regular rate   Extremities: No clubbing, no edema  Neuromuscular: Cranial nerves grossly intact, no focal deficits      RESULTS/DATA     CARBON DIOXIDE (mmol/L)   Date Value   06/11/2025 25     Bicarbonate (mmol/L)   Date Value   07/30/2025 22   05/01/2025 23   11/04/2024 27     IRON, TOTAL (mcg/dL)   Date Value   01/31/2025 129     Iron (ug/dL)   Date Value   07/30/2025 100   05/01/2025 94   11/04/2024 118     % SATURATION (% (calc))   Date Value   01/31/2025 32     % Saturation (%)   Date Value   07/30/2025 27   05/01/2025 22 (L)   11/04/2024 31     IRON BINDING CAPACITY (mcg/dL (calc))   Date Value   01/31/2025 409     TIBC (ug/dL)   Date Value   07/30/2025 377   05/01/2025 419   11/04/2024 385     Ferritin   Date Value   07/30/2025 376 ng/mL (H)   04/09/2021 299 ug/L             ASSESSMENT/PLAN     Mr. Wolfe is a 50 y.o. male and He was referred to the [facility] for evaluation of BESSIE    Problem List, Orders, Assessment, Recommendations:  Problem List Items Addressed This Visit           ICD-10-CM    BESSIE (obstructive sleep apnea) G47.33    -update diagnosis with HSAT  -avoid supine sleep  -avoid drowsy driving  -would like to update cpap as indicated to treat his sleep apnea, plan to set up with [facility]         Inadequate sleep hygiene Z72.821    -review sleep hygiene  -limit caffeine to no more than 2 servings per day and by 12PM; currently at 5-6  servings daily into the evening          Other Visit Diagnoses         Codes      Obstructive sleep apnea    -  Primary G47.33    Relevant Orders    Home sleep apnea test (HSAT)            BMI ~35  -exercises/lifts regularly  -healthy diet    Disposition    Contact with results           [1] No Known Allergies  [2]   Current Outpatient Medications   Medication Sig Dispense Refill    atorvastatin (Lipitor) 40 mg tablet TAKE 1 TABLET BY MOUTH EVERY DAY AS DIRECTED 90 tablet 1    gemfibrozil (Lopid) 600 mg tablet TAKE 1 TABLET BY MOUTH EVERY DAY 90 tablet 1    ibuprofen 200 mg tablet Take 1 tablet (200 mg) by mouth every 8 hours if needed for mild pain (1 - 3).      lisinopril 20 mg tablet TAKE 1 TABLET BY MOUTH EVERY DAY. 90 tablet 1    meloxicam (Mobic) 7.5 mg tablet Take 1 tablet (7.5 mg) by mouth once daily.      omeprazole (PriLOSEC) 20 mg DR capsule Take 1 capsule (20 mg) by mouth once daily in the morning. Take before meals. Do not crush or chew. 30 capsule 3    sertraline (Zoloft) 100 mg tablet TAKE 1 AND 1/2 TABLETS BY MOUTH ONCE DAILY. 135 tablet 1    tirzepatide, weight loss, (Zepbound) 2.5 mg/0.5 mL injection Inject 2.5 mg under the skin every 7 days. 4 each 1    tirzepatide, weight loss, (Zepbound) 5 mg/0.5 mL solution Inject 5 mg under the skin every 7 days. 4 mL 0     No current facility-administered medications for this visit.     Facility-Administered Medications Ordered in Other Visits   Medication Dose Route Frequency Provider Last Rate Last Admin    lidocaine (cardiac) (Xylocaine) injection  - Omnicell Override Pull             midazolam (Versed) injection  - Omnicell Override Pull             rocuronium (ZeMuron) injection  - Omnicell Override Pull             sevoflurane inhaler solution  - Omnicell Override Pull             sugammadex (Bridion) injection  - Omnicell Override Pull            [3]   Past Surgical History:  Procedure Laterality Date    APPENDECTOMY  08/18/2014    Appendectomy    HIP  ARTHROPLASTY      IR BIOPSY BONE MARROW Right 12/11/2023    IR BIOPSY BONE MARROW 12/11/2023 Blanca Haas MD PAR ANGIO    IR CVC PORT PLACEMENT  12/13/2023    IR CVC PORT PLACEMENT 12/13/2023 Blanca Haas MD PAR ANGIO    JOINT REPLACEMENT      MOUTH SURGERY  01/11/2016    Oral Surgery Tooth Extraction    OTHER SURGICAL HISTORY  01/22/2020    Rotator cuff repair    OTHER SURGICAL HISTORY  02/18/2021    Hip surgery    ROTATOR CUFF REPAIR      SHOULDER SURGERY  08/18/2014    Shoulder Surgery    US GUIDED BIOPSY LYMPH NODE SUPERFICIAL  10/09/2023    US GUIDED BIOPSY LYMPH NODE SUPERFICIAL 10/9/2023 PAR US   [4]   Family History  Problem Relation Name Age of Onset    Lung cancer Father

## 2025-08-06 NOTE — ASSESSMENT & PLAN NOTE
-review sleep hygiene  -limit caffeine to no more than 2 servings per day and by 12PM; currently at 5-6 servings daily into the evening

## 2025-08-06 NOTE — ASSESSMENT & PLAN NOTE
-update diagnosis with HSAT  -avoid supine sleep  -avoid drowsy driving  -would like to update cpap as indicated to treat his sleep apnea, plan to set up with [facility]

## 2025-08-18 ENCOUNTER — HOSPITAL ENCOUNTER (OUTPATIENT)
Dept: RADIOLOGY | Facility: CLINIC | Age: 50
Discharge: HOME | End: 2025-08-18
Payer: COMMERCIAL

## 2025-08-18 DIAGNOSIS — C81.72 OTHER CLASSICAL HODGKIN LYMPHOMA OF INTRATHORACIC LYMPH NODES: ICD-10-CM

## 2025-08-18 DIAGNOSIS — C81.92 HODGKIN LYMPHOMA OF INTRATHORACIC LYMPH NODES, UNSPECIFIED HODGKIN LYMPHOMA TYPE (MULTI): ICD-10-CM

## 2025-08-18 PROCEDURE — 78815 PET IMAGE W/CT SKULL-THIGH: CPT | Mod: PET TUMOR SUBSQ TX STRATEGY | Performed by: INTERNAL MEDICINE

## 2025-08-18 PROCEDURE — A9552 F18 FDG: HCPCS | Performed by: INTERNAL MEDICINE

## 2025-08-18 PROCEDURE — 78815 PET IMAGE W/CT SKULL-THIGH: CPT | Mod: PS

## 2025-08-18 PROCEDURE — 3430000001 HC RX 343 DIAGNOSTIC RADIOPHARMACEUTICALS: Performed by: INTERNAL MEDICINE

## 2025-08-18 RX ORDER — FLUDEOXYGLUCOSE F 18 200 MCI/ML
11.74 INJECTION, SOLUTION INTRAVENOUS
Status: COMPLETED | OUTPATIENT
Start: 2025-08-18 | End: 2025-08-18

## 2025-08-18 RX ADMIN — FLUDEOXYGLUCOSE F 18 11.74 MILLICURIE: 200 INJECTION, SOLUTION INTRAVENOUS at 08:51

## 2025-08-22 ENCOUNTER — CLINICAL SUPPORT (OUTPATIENT)
Dept: SLEEP MEDICINE | Facility: HOSPITAL | Age: 50
End: 2025-08-22
Payer: COMMERCIAL

## 2025-08-22 DIAGNOSIS — G47.33 OBSTRUCTIVE SLEEP APNEA: ICD-10-CM

## 2025-09-03 ENCOUNTER — APPOINTMENT (OUTPATIENT)
Dept: SLEEP MEDICINE | Facility: HOSPITAL | Age: 50
End: 2025-09-03
Payer: COMMERCIAL

## 2025-09-11 ENCOUNTER — APPOINTMENT (OUTPATIENT)
Dept: PRIMARY CARE | Facility: CLINIC | Age: 50
End: 2025-09-11
Payer: COMMERCIAL

## 2026-01-21 ENCOUNTER — APPOINTMENT (OUTPATIENT)
Facility: CLINIC | Age: 51
End: 2026-01-21
Payer: COMMERCIAL

## (undated) DEVICE — REST, HEAD, BAGEL, 9 IN

## (undated) DEVICE — TUBING, SUCTION, CONNECTING, STERILE 0.25 X 120 IN., LF

## (undated) DEVICE — TRAP, SPECIMEN, 40 ML

## (undated) DEVICE — MARKER, SKIN, RULER AND LABEL PACK, CUSTOM

## (undated) DEVICE — Device

## (undated) DEVICE — DRESSING, NON-ADHERENT, TELFA, OUCHLESS, 3 X 8 IN, STERILE

## (undated) DEVICE — SYRINGE, LUER LOCK, 12ML

## (undated) DEVICE — MANIFOLD, 4 PORT NEPTUNE STANDARD

## (undated) DEVICE — SYRINGE, 10 CC, LUER LOCK

## (undated) DEVICE — NEEDLE, BIOPSY, ACQUIRE, PULMONARY 22G

## (undated) DEVICE — TUBING, CLEAR N-COND, 5MM X 10, LF

## (undated) DEVICE — SLEEVE, VASO PRESS, CALF GARMENT, MEDIUM, GREEN

## (undated) DEVICE — COVER, TABLE, 44 X 75 IN, DISPOSABLE, LF, STERILE

## (undated) DEVICE — SYRINGE, 60 CC, LUER LOCK, MONOJECT, W/O CAP, LF

## (undated) DEVICE — DRAPE, INCISE, ANTIMICROBIAL, IOBAN 2, LARGE, 17 X 23 IN, DISPOSABLE, STERILE

## (undated) DEVICE — COVER, CART, 45 X 27 X 48 IN, CLEAR

## (undated) DEVICE — APPLICATOR, CHLORAPREP, W/ORANGE TINT, 26ML

## (undated) DEVICE — STOPCOCK, 4 WAY, LARGE BORE, ROTATING, LUER LOCK, MALE

## (undated) DEVICE — CUP, SOLUTION

## (undated) DEVICE — NEEDLE, SPINAL, QUINCKE, 18 G X 3.5 IN, PINK HUB

## (undated) DEVICE — BOWL, BASIN, 32 OZ, STERILE

## (undated) DEVICE — CORD, MONOPOLAR, 10 FT, DISPOSABLE

## (undated) DEVICE — STOPCOCK, 4 WAY, SMALL BODY, W/SWIVEL, ULTRA, LIPD RESISTANT, LUER LOCK, MALE, LF

## (undated) DEVICE — ADAPTER, FIBEROPTIC, SWIVEL, BRONCHOSCOPE

## (undated) DEVICE — TRAY, MINOR, SINGLE BASIN, STERILE

## (undated) DEVICE — SPONGE, VAGINAL, 4-PLY, 2X72